# Patient Record
Sex: FEMALE | Race: WHITE | NOT HISPANIC OR LATINO | Employment: PART TIME | URBAN - METROPOLITAN AREA
[De-identification: names, ages, dates, MRNs, and addresses within clinical notes are randomized per-mention and may not be internally consistent; named-entity substitution may affect disease eponyms.]

---

## 2017-07-24 DIAGNOSIS — I63.9 CEREBROVASCULAR ACCIDENT (CVA), UNSPECIFIED MECHANISM (H): ICD-10-CM

## 2017-07-24 RX ORDER — ATORVASTATIN CALCIUM 20 MG/1
20 TABLET, FILM COATED ORAL DAILY
Qty: 90 TABLET | Refills: 0 | Status: SHIPPED | OUTPATIENT
Start: 2017-07-24 | End: 2017-10-21

## 2017-07-24 NOTE — TELEPHONE ENCOUNTER
atorvastatin (LIPITOR) 20 MG tablet    Last Written Prescription Date:  8/1/16  Last Fill Quantity: 90,   # refills: 3  Last Office Visit with [facility], [facility] or [facility] prescribing provider:  10/3/16  Future Office visit:    none

## 2017-08-31 DIAGNOSIS — I10 ESSENTIAL HYPERTENSION: ICD-10-CM

## 2017-09-05 RX ORDER — TRIAMTERENE AND HYDROCHLOROTHIAZIDE 37.5; 25 MG/1; MG/1
1 CAPSULE ORAL EVERY MORNING
Qty: 90 CAPSULE | Refills: 3 | Status: SHIPPED | OUTPATIENT
Start: 2017-09-05 | End: 2017-10-23

## 2017-09-05 NOTE — TELEPHONE ENCOUNTER
DYAZIDE  37.5-25 MG      Last Written Prescription Date:  10/7/16  Last Fill Quantity: 90, # refills: 3  Last Office Visit : 10/3/16  Next Clinic Visit:  NONE     Results for ROME RODRIGUEZ (MRN [id number]) as of 9/4/2017 19:33   Ref. Range 7/19/2016 16:09   Sodium Latest Ref Range: 133 - 144 mmol/L 140       Potassium   Date Value Ref Range Status   10/03/2016 3.0 (L) 3.4 - 5.3 mmol/L Final     Creatinine   Date Value Ref Range Status   07/19/2016 0.87 0.52 - 1.04 mg/dL Final     BP Readings from Last 3 Encounters:   10/03/16 135/83   08/01/16 (!) 162/94   07/20/16 (!) 160/94   Routing refill request to provider for review/approval because:  *labs are overdue, route to provider

## 2017-10-12 DIAGNOSIS — K21.9 GASTROESOPHAGEAL REFLUX DISEASE WITHOUT ESOPHAGITIS: Primary | ICD-10-CM

## 2017-10-12 RX ORDER — PANTOPRAZOLE SODIUM 40 MG/1
40 TABLET, DELAYED RELEASE ORAL DAILY
Qty: 30 TABLET | Refills: 0 | Status: SHIPPED | OUTPATIENT
Start: 2017-10-12 | End: 2017-10-23

## 2017-10-12 NOTE — LETTER
Natali Myers  [address] [address]        October 12, 2017    This letter is a reminder that you are overdue to see your Primary Care Provider for an Annual Visit and needed labs. You must be seen by your Primary Care Provider on a yearly basis and have appropriate labs drawn for continued care and prescription refills. Please call [phone] to schedule an appointment for an Annual Visit with Dr Moncho DEAL.       You have been given a 30 day supply/refill of your protonix 40 mg tabs  while you get your clinic visit/labs completed.      [facility],    Primary Care Center

## 2017-10-12 NOTE — TELEPHONE ENCOUNTER
pantoprazole (PROTONIX) 40 mg  Last Written Prescription Date:  9/23/16  Last Fill Quantity: 120,   # refills: 11  Last Office Visit with [facility], P or [facility] prescribing provider: 10/3/16  Future Office visit:   None    appt letter sent. needs annual appt.    Routing refill request to provider for review/approval because: pantoprazole (PROTONIX) 40 mg, per fax. Previous was 20 mg , 2 tabs.  40 mg is delayed release. Ok to switch?

## 2017-10-21 DIAGNOSIS — I63.9 CEREBROVASCULAR ACCIDENT (CVA), UNSPECIFIED MECHANISM (H): ICD-10-CM

## 2017-10-21 DIAGNOSIS — I10 ESSENTIAL HYPERTENSION: ICD-10-CM

## 2017-10-21 DIAGNOSIS — K21.9 GASTROESOPHAGEAL REFLUX DISEASE WITHOUT ESOPHAGITIS: ICD-10-CM

## 2017-10-23 RX ORDER — TRIAMTERENE AND HYDROCHLOROTHIAZIDE 37.5; 25 MG/1; MG/1
1 CAPSULE ORAL EVERY MORNING
Qty: 30 CAPSULE | Refills: 3 | Status: ON HOLD | OUTPATIENT
Start: 2017-10-23 | End: 2018-02-02

## 2017-10-23 RX ORDER — PANTOPRAZOLE SODIUM 40 MG/1
40 TABLET, DELAYED RELEASE ORAL DAILY
Qty: 30 TABLET | Refills: 0 | Status: SHIPPED | OUTPATIENT
Start: 2017-10-23 | End: 2017-12-07

## 2017-10-23 RX ORDER — ATORVASTATIN CALCIUM 20 MG/1
TABLET, FILM COATED ORAL
Qty: 30 TABLET | Refills: 0 | Status: SHIPPED | OUTPATIENT
Start: 2017-10-23 | End: 2017-11-22

## 2017-10-23 NOTE — TELEPHONE ENCOUNTER
lipitor  Last Written Prescription Date:  7/24/17  Last Fill Quantity: 90,   # refills: 0  Last Office Visit : 10/3/16  Future Office visit:  No future appt  Lab Results   Component Value Date    CHOL 122 08/01/2016     Lab Results   Component Value Date    HDL 51 08/01/2016     Lab Results   Component Value Date    LDL 32 08/01/2016     Lab Results   Component Value Date    TRIG 194 08/01/2016       Routing refill request to clinic nurse for review/approval because:  Nicole refill given 7/24/17, scheduling letter sent, no pending appt  Labs overdue    Pt states that she never received a letter about making an appointment or labs needed to be done.   Pt called and she is aware she needs appt within a month for refills.  Venus Crenshaw RN 3:12 PM on 10/23/2017.

## 2017-11-09 DIAGNOSIS — Z13.1 SCREENING FOR DIABETES MELLITUS: ICD-10-CM

## 2017-11-09 DIAGNOSIS — Z00.00 HEALTH CARE MAINTENANCE: Primary | ICD-10-CM

## 2017-11-22 DIAGNOSIS — I63.9 CEREBROVASCULAR ACCIDENT (CVA), UNSPECIFIED MECHANISM (H): ICD-10-CM

## 2017-11-27 RX ORDER — ATORVASTATIN CALCIUM 20 MG/1
20 TABLET, FILM COATED ORAL DAILY
Qty: 30 TABLET | Refills: 0 | Status: SHIPPED | OUTPATIENT
Start: 2017-11-27 | End: 2017-12-07

## 2017-11-27 NOTE — TELEPHONE ENCOUNTER
atorvastatin (LIPITOR) 20 MG tablet    Last Written Prescription Date:  10/23/17  Last Fill Quantity: 30,   # refills: 0  Last Office Visit : 10/3/16  Future Office visit:  12/5/17

## 2017-12-03 ASSESSMENT — ENCOUNTER SYMPTOMS
HOARSE VOICE: 1
TROUBLE SWALLOWING: 0
MYALGIAS: 1
POOR WOUND HEALING: 1
NAIL CHANGES: 0
ARTHRALGIAS: 1
SORE THROAT: 1
JOINT SWELLING: 0
BACK PAIN: 0
STIFFNESS: 1
TASTE DISTURBANCE: 0
NECK PAIN: 1
SMELL DISTURBANCE: 0
SINUS PAIN: 1
SKIN CHANGES: 0
SINUS CONGESTION: 0
NECK MASS: 0
MUSCLE WEAKNESS: 0
MUSCLE CRAMPS: 1

## 2017-12-04 ENCOUNTER — TELEPHONE (OUTPATIENT)
Dept: INTERNAL MEDICINE | Facility: CLINIC | Age: 57
End: 2017-12-04

## 2017-12-04 DIAGNOSIS — E87.6 HYPOKALEMIA: Primary | ICD-10-CM

## 2017-12-04 DIAGNOSIS — Z00.00 HEALTH CARE MAINTENANCE: ICD-10-CM

## 2017-12-04 DIAGNOSIS — Z13.1 SCREENING FOR DIABETES MELLITUS: ICD-10-CM

## 2017-12-04 LAB
ANION GAP SERPL CALCULATED.3IONS-SCNC: 8 MMOL/L (ref 3–14)
BUN SERPL-MCNC: 14 MG/DL (ref 7–30)
CALCIUM SERPL-MCNC: 8.9 MG/DL (ref 8.5–10.1)
CHLORIDE SERPL-SCNC: 99 MMOL/L (ref 94–109)
CHOLEST SERPL-MCNC: 107 MG/DL
CO2 SERPL-SCNC: 30 MMOL/L (ref 20–32)
CREAT SERPL-MCNC: 0.87 MG/DL (ref 0.52–1.04)
GFR SERPL CREATININE-BSD FRML MDRD: 67 ML/MIN/1.7M2
GLUCOSE SERPL-MCNC: 121 MG/DL (ref 70–99)
HBA1C MFR BLD: 6 % (ref 4.3–6)
HDLC SERPL-MCNC: 56 MG/DL
LDLC SERPL CALC-MCNC: 23 MG/DL
NONHDLC SERPL-MCNC: 52 MG/DL
POTASSIUM SERPL-SCNC: 2.9 MMOL/L (ref 3.4–5.3)
SODIUM SERPL-SCNC: 137 MMOL/L (ref 133–144)
TRIGL SERPL-MCNC: 142 MG/DL
TSH SERPL DL<=0.005 MIU/L-ACNC: 2.89 MU/L (ref 0.4–4)

## 2017-12-04 RX ORDER — POTASSIUM CHLORIDE 750 MG/1
20 TABLET, EXTENDED RELEASE ORAL DAILY
Qty: 60 TABLET | Refills: 1 | Status: SHIPPED | OUTPATIENT
Start: 2017-12-04 | End: 2017-12-06

## 2017-12-04 NOTE — TELEPHONE ENCOUNTER
Potassium is 2.9 today.  She is having muscle aches and dry mouth.  Recommended that she hold her Dyazide, start K Dur 20 mEq today and tomorrow  We'll recheck potassium tomorrow in clinic    Lilli Ivan MD

## 2017-12-05 ENCOUNTER — OFFICE VISIT (OUTPATIENT)
Dept: INTERNAL MEDICINE | Facility: CLINIC | Age: 57
End: 2017-12-05

## 2017-12-05 VITALS
OXYGEN SATURATION: 96 % | WEIGHT: 252.9 LBS | HEIGHT: 62 IN | RESPIRATION RATE: 22 BRPM | SYSTOLIC BLOOD PRESSURE: 143 MMHG | BODY MASS INDEX: 46.54 KG/M2 | TEMPERATURE: 97.9 F | DIASTOLIC BLOOD PRESSURE: 84 MMHG | HEART RATE: 91 BPM

## 2017-12-05 DIAGNOSIS — E87.6 HYPOKALEMIA: ICD-10-CM

## 2017-12-05 DIAGNOSIS — Z23 NEED FOR VACCINATION: ICD-10-CM

## 2017-12-05 DIAGNOSIS — J45.20 MILD INTERMITTENT ASTHMA WITHOUT COMPLICATION: Primary | ICD-10-CM

## 2017-12-05 DIAGNOSIS — E66.01 MORBID OBESITY (H): ICD-10-CM

## 2017-12-05 LAB — POTASSIUM SERPL-SCNC: 3.4 MMOL/L (ref 3.4–5.3)

## 2017-12-05 RX ORDER — COVID-19 ANTIGEN TEST
KIT MISCELLANEOUS
COMMUNITY
Start: 2016-08-02

## 2017-12-05 RX ORDER — ALBUTEROL SULFATE 90 UG/1
2 AEROSOL, METERED RESPIRATORY (INHALATION) EVERY 6 HOURS PRN
Qty: 1 INHALER | Refills: 1 | Status: SHIPPED | OUTPATIENT
Start: 2017-12-05 | End: 2020-01-30

## 2017-12-05 ASSESSMENT — ENCOUNTER SYMPTOMS
LOSS OF CONSCIOUSNESS: 0
TASTE DISTURBANCE: 0
HEMOPTYSIS: 0
WEAKNESS: 0
SWOLLEN GLANDS: 0
WHEEZING: 0
BLOATING: 0
DYSURIA: 0
RESPIRATORY PAIN: 0
DECREASED LIBIDO: 0
LEG PAIN: 0
HOT FLASHES: 0
DOUBLE VISION: 0
NECK PAIN: 1
SKIN CHANGES: 0
WEIGHT LOSS: 0
ALTERED TEMPERATURE REGULATION: 0
HEMATURIA: 0
NAUSEA: 0
POLYDIPSIA: 0
FATIGUE: 0
BRUISES/BLEEDS EASILY: 0
HYPOTENSION: 0
PARALYSIS: 0
HEARTBURN: 0
RECTAL PAIN: 0
HALLUCINATIONS: 0
ARTHRALGIAS: 1
SLEEP DISTURBANCES DUE TO BREATHING: 0
DIFFICULTY URINATING: 0
EYE IRRITATION: 0
DEPRESSION: 0
EYE PAIN: 0
JOINT SWELLING: 0
NAIL CHANGES: 0
TREMORS: 0
MUSCLE CRAMPS: 1
SNORES LOUDLY: 0
VOMITING: 0
NUMBNESS: 0
NERVOUS/ANXIOUS: 0
CLAUDICATION: 0
SINUS PAIN: 1
BOWEL INCONTINENCE: 0
HYPERTENSION: 0
BLOOD IN STOOL: 0
BACK PAIN: 0
FLANK PAIN: 0
JAUNDICE: 0
TROUBLE SWALLOWING: 0
NIGHT SWEATS: 0
COUGH DISTURBING SLEEP: 0
SEIZURES: 0
EYE REDNESS: 0
MUSCLE WEAKNESS: 0
RECTAL BLEEDING: 0
DECREASED CONCENTRATION: 0
POOR WOUND HEALING: 1
WEIGHT GAIN: 0
SINUS CONGESTION: 0
NECK MASS: 0
COUGH: 0
STIFFNESS: 1
SPEECH CHANGE: 0
TINGLING: 0
DECREASED APPETITE: 0
HOARSE VOICE: 1
EYE WATERING: 0
PANIC: 0
SORE THROAT: 1
TACHYCARDIA: 0
LEG SWELLING: 0
POLYPHAGIA: 0
MEMORY LOSS: 0
SYNCOPE: 0
DISTURBANCES IN COORDINATION: 0
MYALGIAS: 1
BREAST MASS: 0
INSOMNIA: 0
SHORTNESS OF BREATH: 0
ORTHOPNEA: 0
DYSPNEA ON EXERTION: 0
HEADACHES: 0
INCREASED ENERGY: 0
SMELL DISTURBANCE: 0
EXERCISE INTOLERANCE: 0
SPUTUM PRODUCTION: 0
POSTURAL DYSPNEA: 0
PALPITATIONS: 0
LIGHT-HEADEDNESS: 0
ABDOMINAL PAIN: 0
DIZZINESS: 0
EXTREMITY NUMBNESS: 0
BREAST PAIN: 0
DIARRHEA: 0
FEVER: 0
CONSTIPATION: 0
CHILLS: 0

## 2017-12-05 ASSESSMENT — PAIN SCALES - GENERAL: PAINLEVEL: NO PAIN (0)

## 2017-12-05 NOTE — MR AVS SNAPSHOT
After Visit Summary   12/5/2017    Natali Myers    MRN: [id number]           Patient Information     Date Of Birth          1960        Visit Information        Provider Department      12/5/2017 4:30 PM Lilli Ivan MD [facility]        Today's Diagnoses     Mild intermittent asthma without complication    -  1    Need for vaccination        Morbid obesity (H)        Hypokalemia          Care Instructions    Primary Care Center Medication Refill Request Information:  * Please contact your pharmacy regarding ANY request for medication refills.  ** [facility] Prescription Fax = [phone]  * Please allow 3 business days for routine medication refills.  * Please allow 5 business days for controlled substance medication refills.     Primary Care Center Test Result notification information:  *You will be notified with in 7-10 days of your appointment day regarding the results of your test.  If you are on MyChart you will be notified as soon as the provider has reviewed the results and signed off on them.    Primary Care Center [phone]     Weight Management [phone]             Follow-ups after your visit        Additional Services     Bariatric Adult Referral       Your provider has referred you to: [facility]: [facility] [phone]. [email or web address]    Please be aware that coverage of these services is subject to the terms and limitations of your health insurance plan.  Call member services at your health plan with any benefit or coverage questions.      Please bring the following with you to your appointment:      (1) List of current medications   (2) This referral request   (3) Any documents/labs given to you for this referral                  Your next 10 appointments already scheduled     Dec 05, 2017  6:00 PM CST   LAB with [facility] ([facility])    [address]  [address] [facility] [place] [address] [phone]           Please do not eat 10-12 hours before your appointment if you are coming in fasting for labs on lipids, cholesterol, or glucose (sugar). This does not apply to pregnant women. Water, hot tea and black coffee (with nothing added) are okay. Do not drink other fluids, diet soda or chew gum.              Future tests that were ordered for you today     Open Future Orders        Priority Expected Expires Ordered    Potassium Routine  12/6/2018 12/5/2017            Who to contact     Please call your clinic at [phone] to:    Ask questions about your health    Make or cancel appointments    Discuss your medicines    Learn about your test results    Speak to your doctor   If you have compliments or concerns about an experience at your clinic, or if you wish to file a complaint, please contact [facility] Patient Relations at [phone] or email us at [email or web address]         Additional Information About Your Visit        [employer] Information     [employer] gives you secure access to your electronic health record. If you see a primary care provider, you can also send messages to your care team and make appointments. If you have questions, please call your primary care clinic.  If you do not have a primary care provider, please call [phone] and they will assist you.      [employer] is an electronic gateway that provides easy, online access to your medical records. With [employer], you can request a clinic appointment, read your test results, renew a prescription or communicate with your care team.     To access your existing account, please contact your [facility] or call [phone] for assistance.        Care EveryWhere ID     This is your Care EveryWhere ID. This could be used by other organizations to access your [facility]  "records  [id number]        Your Vitals Were     Pulse Temperature Respirations Height Last Period Pulse Oximetry    91 97.9  F (36.6  C) (Oral) 22 1.562 m (5' 1.5\") (LMP Unknown) 96%    Breastfeeding? BMI (Body Mass Index)                No 47.01 kg/m2           Blood Pressure from Last 3 Encounters:   12/05/17 143/84   10/03/16 135/83   08/01/16 (!) 162/94    Weight from Last 3 Encounters:   12/05/17 114.7 kg (252 lb 14.4 oz)   10/03/16 108.4 kg (239 lb)   08/01/16 109.3 kg (241 lb)              We Performed the Following     ADMIN INFLUENZA VIRUS VAC     Bariatric Adult Referral     FLU VACCINE, 3 YRS +, IM (FLUZONE)          Today's Medication Changes          These changes are accurate as of: 12/5/17  5:19 PM.  If you have any questions, ask your nurse or doctor.               Start taking these medicines.        Dose/Directions    albuterol 108 (90 BASE) MCG/ACT Inhaler   Commonly known as:  PROAIR HFA/PROVENTIL HFA/VENTOLIN HFA   Used for:  Mild intermittent asthma without complication   Started by:  Lilli Ivan MD        Dose:  2 puff   Inhale 2 puffs into the lungs every 6 hours as needed for shortness of breath / dyspnea or wheezing   Quantity:  1 Inhaler   Refills:  1         Stop taking these medicines if you haven't already. Please contact your care team if you have questions.     TYLENOL 500 MG tablet   Generic drug:  acetaminophen   Stopped by:  Lilli Ivan MD                Where to get your medicines      These medications were sent to [facility]/pharmacy #[id number] - [place], [place] - [address]  [address], [place] [address]     Phone:  [phone]     albuterol 108 (90 BASE) MCG/ACT Inhaler                Primary Care Provider Office Phone # Fax #    Lilli Ivan [phone] [address] [address]        Equal Access to Services     KELLIE JONSE AH: Roberto Booth, violet osorio, qaybta kanima valentin " wilfredo [place] juan j blanton'aan ah. So [facility] [place] [phone].    ATENCIÓN: Si reela syed, tiene a ojeda disposición servicios gratuitos de asistencia lingüística. Bijan al [phone].    We comply with applicable federal civil rights laws and [place] laws. We do not discriminate on the basis of race, color, national origin, age, disability, sex, sexual orientation, or gender identity.            Thank you!     Thank you for choosing [facility]  for your care. Our goal is always to provide you with excellent care. Hearing back from our patients is one way we can continue to improve our services. Please take a few minutes to complete the written survey that you may receive in the mail after your visit with us. Thank you!             Your Updated Medication List - Protect others around you: Learn how to safely use, store and throw away your medicines at www.disposemymeds.org.          This list is accurate as of: 12/5/17  5:19 PM.  Always use your most recent med list.                   Brand Name Dispense Instructions for use Diagnosis    albuterol 108 (90 BASE) MCG/ACT Inhaler    PROAIR HFA/PROVENTIL HFA/VENTOLIN HFA    1 Inhaler    Inhale 2 puffs into the lungs every 6 hours as needed for shortness of breath / dyspnea or wheezing    Mild intermittent asthma without complication       ALEVE 220 MG capsule   Generic drug:  naproxen sodium           aspirin 325 MG tablet     180 tablet    Take 1 tablet (325 mg) by mouth daily    Cerebrovascular accident (CVA), unspecified mechanism (H)       atorvastatin 20 MG tablet    LIPITOR    30 tablet    Take 1 tablet (20 mg) by mouth daily    Cerebrovascular accident (CVA), unspecified mechanism (H)       Blood Pressure Monitoring Kit     1 kit    1 Device daily Pt to check BP readings daily at home    BMI 40.0-44.9, adult (H)       DIPHENHYDRAMINE HCL PO      Take 25 mg by mouth nightly as needed        FLONASE ALLERGY RELIEF 50 MCG/ACT spray   Generic drug:   fluticasone      Spray 2 sprays into both nostrils daily        pantoprazole 40 MG EC tablet    PROTONIX    30 tablet    Take 1 tablet (40 mg) by mouth daily    Gastroesophageal reflux disease without esophagitis       potassium chloride SA 10 MEQ CR tablet    K-DUR/KLOR-CON M    60 tablet    Take 2 tablets (20 mEq) by mouth daily    Hypokalemia       triamterene-hydrochlorothiazide 37.5-25 MG per capsule    DYAZIDE    30 capsule    Take 1 capsule by mouth every morning    Essential hypertension       ZYRTEC PO      Take 10 mg by mouth as needed

## 2017-12-05 NOTE — NURSING NOTE
Chief Complaint   Patient presents with     Physical     Patient is here for annual physical exam     Mirza Neal CMA (AAMA) at 4:34 PM on 12/5/2017

## 2017-12-05 NOTE — PROGRESS NOTES
Cc:  Annual exam  History of present illness: Natali is a 57-year-old woman with a past medical history of hyperlipidemia, hypertension, GERD, and stroke in the right frontal palatine junction noted on July 18, 2016 who presents for medical exam.    She has generally feeling well but has a few concerns:  1.  Hypokalemia. On Dyazide once daily, her serum potassium was 2.53 days ago. I called her, gave her prescription for K Dur and she has taken 40 mEq yesterday and today. She noticed an improvement in her muscle aches on the medication.  2.  Dry skin. She bathes every other day and uses lotion thoroughly. Nonetheless she has dry skin on her scalp and arms.  3.  History of stroke July 2016. She had no warning prior to the stroke and has had no TIAs since that time. She has been on ASA 3 and 25 mg daily.  The stroke was ischemic. There was an infarct in the right frontoparietal junction. MRA of the neck showed mild to severe stenoses in the proximal vertebral arteries right greater than left and tortuous internal carotid arteries.  4.  Osteoarthritis of the knees. She has had bilateral knee surgery at age 23 and since then has had continued knee pain. She is able to walk up and down the stairs of her condominium and is able to walk good distances at a reasonable pace. She cannot use bicycle because it hurts her knees.  5.  Essential hypertension. She has a blood pressure cuff at home but has not been using it. She would like to monitor her blood pressure at home. We discussed the importance of achieving a target of less than 130/80.  6.  Mild intermittent asthma.  Last spring she opened movile home, alllergic reaciton, cough , wheeze.  She had been using Zyrtec and that was insufficient prophylaxis. She would like an albuterol inhaler   7.  Healthcare maintenance: Up-to-date on colonoscopy, mammography. She is status post hysterectomy.   8.  Bilateral plantar fasciitis.   9.  Laboratory results reviewed in detail. Of  note her LDL cholesterol is 23. Also she is prediabetic with an A1c of 6.0. We discussed that in some detail.     Past Medical History:   Diagnosis Date     Anemia     due to bleeding from uterus/fibroid     Arthritis     OA left hip, knees, wrists     Asthma with bronchitis      Chronic headaches      Fibroids      GERD (gastroesophageal reflux disease)     omprazole     Neck pain     trapezius tightness     Seasonal allergies     mold and grass     Stroke (H) July 18, 2016.    MRI scan that showed recent infarct in the central white matter of the right frontoparietal junction;       Past Surgical History:   Procedure Laterality Date     DILATION AND CURETTAGE  5/24/2014    Procedure: DILATION AND CURETTAGE;  Surgeon: Elyse Mcconnell MD;  Location: [facility] OR     HYSTERECTOMY TOTAL ABDOMINAL  6/19/2014    Procedure: HYSTERECTOMY TOTAL ABDOMINAL;  Surgeon: Elyse Mcconnell MD;  Location: [facility] OR     KNEE SURGERY  age 23     cartilage/ incision both knees     RELEASE CARPAL TUNNEL BILATERAL  1996    [place]     SALPINGO-OOPHORECTOMY BILATERAL  6/19/2014    Procedure: SALPINGO-OOPHORECTOMY BILATERAL;  Surgeon: Elyse Mcconnell MD;  Location: [facility] OR     WRIST SURGERY     Review of Systems     Constitutional:  Negative for fever, chills, weight loss, weight gain, fatigue, decreased appetite, night sweats, recent stressors, height gain, height loss, post-operative complications, incisional pain, hallucinations, increased energy, hyperactivity and confused.   HENT:  Positive for tinnitus, hoarse voice, sore throat, dry mouth and sinus pain. Negative for ear pain, hearing loss, nosebleeds, trouble swallowing, mouth sores, ear discharge, tooth pain, gum tenderness, taste disturbance, smell disturbance, hearing aid, bleeding gums, sinus congestion and neck mass.    Eyes:  Negative for double vision, pain, redness, eye pain, decreased vision, eye watering, eye bulging, eye dryness, flashing lights, spots, floaters, strabismus, tunnel  vision, jaundice and eye irritation.   Respiratory:   Negative for cough, hemoptysis, sputum production, shortness of breath, wheezing, sleep disturbances due to breathing, snores loudly, respiratory pain, dyspnea on exertion, cough disturbing sleep and postural dyspnea.    Cardiovascular:  Negative for chest pain, dyspnea on exertion, palpitations, orthopnea, claudication, leg swelling, fingers/toes turn blue, hypertension, hypotension, syncope, history of heart murmur, chest pain on exertion, chest pain at rest, pacemaker, few scattered varicosities, leg pain, sleep disturbances due to breathing, tachycardia, light-headedness, exercise intolerance and edema.   Gastrointestinal:  Negative for heartburn, nausea, vomiting, abdominal pain, diarrhea, constipation, blood in stool, melena, rectal pain, bloating, hemorrhoids, bowel incontinence, jaundice, rectal bleeding, coffee ground emesis and change in stool.   Genitourinary:  Negative for bladder incontinence, dysuria, urgency, hematuria, flank pain, vaginal discharge, difficulty urinating, genital sores, dyspareunia, decreased libido, nocturia, voiding less frequently, arousal difficulty, abnormal vaginal bleeding, excessive menstruation, menstrual changes, hot flashes, vaginal dryness and postmenopausal bleeding.   Musculoskeletal:  Positive for myalgias, arthralgias, stiffness, muscle cramps and neck pain. Negative for back pain, joint swelling, bone pain, muscle weakness and fracture.   Skin:  Positive for itching, poor wound healing, acne, poor wound healing and flaky skin. Negative for nail changes, rash, hair changes, skin changes, warts, scarring, Raynaud's phenomenon, sensitivity to sunlight and skin thickening.   Neurological:  Negative for dizziness, tingling, tremors, speech change, seizures, loss of consciousness, weakness, light-headedness, numbness, headaches, disturbances in coordination, extremity numbness, memory loss, difficulty walking and  "paralysis.   Endo/Heme:  Negative for anemia, swollen glands and bruises/bleeds easily.   Psychiatric/Behavioral:  Negative for depression, hallucinations, memory loss, decreased concentration, mood swings and panic attacks.    Breast:  Negative for breast discharge, breast mass, breast pain and nipple retraction.   Endocrine:  Negative for altered temperature regulation, polyphagia, polydipsia, unwanted hair growth and change in facial hair.    Family History   Problem Relation Age of Onset     DIABETES Mother      Blood Disease Mother      KIDNEY DISEASE Mother      Cardiovascular Father      Alcohol/Drug Father      DIABETES Maternal Grandmother      Thyroid Disease Maternal Grandmother      CANCER Paternal Grandmother      eye      Asthma Brother      CEREBROVASCULAR DISEASE Father      great uncles and aunts have had CVAs     Dementia Father      Dementia Paternal Grandmother      Social History     Social History     Marital status:      Spouse name: N/A     Number of children: N/A     Years of education: N/A     Occupational History     Not on file.     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    , mother of 4 children; 4 grandchildren     /84 (BP Location: Right arm, Patient Position: Chair, Cuff Size: Adult Large)  Pulse 91  Temp 97.9  F (36.6  C) (Oral)  Resp 22  Ht 1.562 m (5' 1.5\")  Wt 114.7 kg (252 lb 14.4 oz)  LMP  (LMP Unknown)  SpO2 96%  Breastfeeding? No  BMI 47.01 kg/m2  Exam:  Constitutional: Pleasant, obese 57-year-old woman   Head: Normocephalic. No masses, lesions, tenderness or abnormalities  Neck: Neck supple. No adenopathy. Thyroid symmetric, normal size,, Carotids without " bruits.  ENT: ENT exam normal, no neck nodes or sinus tenderness  Cardiovascular: negative, PMI normal. No lifts, heaves, or thrills. RRR. No murmurs, clicks gallops or rub  Respiratory: negative, Percussion normal. Good diaphragmatic excursion. Lungs clear  Gastrointestinal: Abdomen soft, non-tender. BS normal. No masses, organomegaly  : Deferred  Musculoskeletal: No pretibial edema. I did not examine her knees.   Skin: There is a circular maculopapular purplish lesion on the anterior shin on the left with a central nodule. The borders are regular and well demarcated. There is no fluctuance.   Neurologic: Gait normal. Reflexes normal and symmetric. Sensation grossly WNL.  Psychiatric: mentation appears normal and affect normal/bright  Hematologic/Lymphatic/Immunologic: Normal cervical lymph nodes      Last Basic Metabolic Panel:  Lab Results   Component Value Date     12/04/2017      Lab Results   Component Value Date    POTASSIUM 2.9 12/04/2017     Lab Results   Component Value Date    CHLORIDE 99 12/04/2017     Lab Results   Component Value Date    ADEN 8.9 12/04/2017     Lab Results   Component Value Date    CO2 30 12/04/2017     Lab Results   Component Value Date    BUN 14 12/04/2017     Lab Results   Component Value Date    CR 0.87 12/04/2017     Lab Results   Component Value Date     12/04/2017       ASSESSMENT  1.  Essential hypertension. I've asked her to monitor her blood pressure at home with a target of less than 130/80. She will notify me if the blood pressure begins to rise above that at home. We will resume the Dyazide along with some supplemental potassium after we check the serum potassium level today.  2.  Dry skin. No evidence of seborrhea keratoses. We talked about reducing the number of showers she takes.  3.  Prediabetes: A1c is 6.0. We talked about how to get more exercise, and to lose some weight. I will refer her to the bariatric weight loss clinic for information. She would  like to think about it based on information from the clinic before she attends. In the meantime she will look into possibilities for getting exercise near her home, perhaps at a local club. She is limited by plantar fasciitis and knee osteoarthritis. Plan monitor in one year.  4.  History of stroke. Ischemic stroke. The most important thing to do now other than continuing ASA is 2 reduce the risk factors and that is what we talked about with the blood pressure, prediabetes and hyperlipidemia.  5.  Morbid obesity.  She has struggled with her weight for years. She has tried many programs and none have been successful. She is not interested in bariatric surgery but may be interested in exploring options for medical bariatric treatment. She may not qualify for bariatric surgery based on criteria but her risk of stroke, her prediabetes, the severe OA of the knees bilaterally, reflux esophagitis, and hypertension are all linked to this obesity.  6.  Skin lesion left anterior leg. This is not a dysplastic nevus. It looks vascular. The central thickening suggested lipogranuloma. Plan to observe it.  7.  Healthcare maintenance: I'll give flu shot today, see below    Natali was seen today for physical.    Diagnoses and all orders for this visit:    Mild intermittent asthma without complication  -     albuterol (PROAIR HFA/PROVENTIL HFA/VENTOLIN HFA) 108 (90 BASE) MCG/ACT Inhaler; Inhale 2 puffs into the lungs every 6 hours as needed for shortness of breath / dyspnea or wheezing    Need for vaccination  -     FLU VACCINE, 3 YRS +, IM (FLUZONE)  -     ADMIN INFLUENZA VIRUS VAC    Morbid obesity (H)  -     Bariatric Adult Referral    Hypokalemia  -     Potassium; Future    Other orders  -     Cancel: WEIGHT MANAGEMENT/ [facility] LIFESTYLE PROGRAM REFERRAL

## 2017-12-05 NOTE — NURSING NOTE
"Injectable Influenza Immunization Documentation    1.  Has the patient received the information for the injectable influenza vaccine? YES     2. Is the patient 6 months of age or older? YES     3. Does the patient have any of the following contraindications?         Severe allergy to eggs? No     Severe allergic reaction to previous influenza vaccines? No   Severe allergy to latex? No       History of Guillain-Shishmaref syndrome? No     Currently have a temperature greater than 100.4F? No        4.  Severely egg allergic patients should have flu vaccine eligibility assessed by an MD, RN, or pharmacist, and those who received flu vaccine should be observed for 15 min by an MD, RN, Pharmacist, Medical Technician, or member of clinic staff.\": YES    5. Latex-allergic patients should be given latex-free influenza vaccine Yes. Please reference the Vaccine latex table to determine if your clinic s product is latex-containing.       Administered Influenza Fluzone Quadrivalent vaccine (see Immunizations in Chart Review). Patient tolerated well.        Mirza Neal CMA at 5:37 PM on 12/5/2017         "

## 2017-12-05 NOTE — PATIENT INSTRUCTIONS
[facility] Medication Refill Request Information:  * Please contact your pharmacy regarding ANY request for medication refills.  ** [facility] Prescription Fax = [phone]  * Please allow 3 business days for routine medication refills.  * Please allow 5 business days for controlled substance medication refills.     [facility] Test Result notification information:  *You will be notified with in 7-10 days of your appointment day regarding the results of your test.  If you are on MyChart you will be notified as soon as the provider has reviewed the results and signed off on them.    [facility] [phone]     Weight Management [phone]

## 2017-12-06 ENCOUNTER — TELEPHONE (OUTPATIENT)
Dept: INTERNAL MEDICINE | Facility: CLINIC | Age: 57
End: 2017-12-06

## 2017-12-06 DIAGNOSIS — E87.6 HYPOKALEMIA: ICD-10-CM

## 2017-12-06 DIAGNOSIS — E87.6 HYPOKALEMIA: Primary | ICD-10-CM

## 2017-12-06 DIAGNOSIS — I10 ESSENTIAL HYPERTENSION: ICD-10-CM

## 2017-12-06 RX ORDER — POTASSIUM CHLORIDE 750 MG/1
10 TABLET, EXTENDED RELEASE ORAL DAILY
Qty: 60 TABLET | Refills: 1 | Status: ON HOLD | COMMUNITY
Start: 2017-12-06 | End: 2018-02-02

## 2017-12-06 NOTE — TELEPHONE ENCOUNTER
I called pt with K results:  3.4  Plan:  Resume dyazide once daily, take kdur 10 meq daily, recheck K in one month, monitor bp at home with goal of <130/80.    Lilli Ivan

## 2018-01-09 ENCOUNTER — MYC REFILL (OUTPATIENT)
Dept: INTERNAL MEDICINE | Facility: CLINIC | Age: 58
End: 2018-01-09

## 2018-01-09 DIAGNOSIS — K21.9 GASTROESOPHAGEAL REFLUX DISEASE WITHOUT ESOPHAGITIS: ICD-10-CM

## 2018-01-09 NOTE — TELEPHONE ENCOUNTER
Message from [facility]:  Serenity Olvera, RN Tue Jan 9, 2018 7:09 AM        ----- Message -----   From: Natali Myers   Sent: 1/9/2018 6:46 AM   To: [facility] Nursing Staff-  Subject: Medication Renewal Request     Original authorizing provider: MD Natali Maloney would like a refill of the following medications:  pantoprazole (PROTONIX) 40 MG EC tablet [Lilli Ivan MD]    Preferred pharmacy: [facility] [id number] [place] [address] [address]    Comment:

## 2018-01-10 RX ORDER — PANTOPRAZOLE SODIUM 40 MG/1
40 TABLET, DELAYED RELEASE ORAL DAILY
Qty: 90 TABLET | Refills: 3 | Status: SHIPPED | OUTPATIENT
Start: 2018-01-10 | End: 2018-12-23

## 2018-01-31 ENCOUNTER — HOSPITAL ENCOUNTER (INPATIENT)
Facility: CLINIC | Age: 58
LOS: 2 days | Discharge: HOME | End: 2018-02-02
Attending: EMERGENCY MEDICINE | Admitting: PSYCHIATRY & NEUROLOGY
Payer: COMMERCIAL

## 2018-01-31 ENCOUNTER — APPOINTMENT (OUTPATIENT)
Dept: CT IMAGING | Facility: CLINIC | Age: 58
End: 2018-01-31
Attending: EMERGENCY MEDICINE
Payer: COMMERCIAL

## 2018-01-31 ENCOUNTER — APPOINTMENT (OUTPATIENT)
Dept: INTERVENTIONAL RADIOLOGY/VASCULAR | Facility: CLINIC | Age: 58
End: 2018-01-31
Payer: COMMERCIAL

## 2018-01-31 DIAGNOSIS — E87.6 HYPOKALEMIA: ICD-10-CM

## 2018-01-31 DIAGNOSIS — I63.9 ACUTE CVA (CEREBROVASCULAR ACCIDENT) (H): ICD-10-CM

## 2018-01-31 LAB
ABO + RH BLD: NORMAL
ABO + RH BLD: NORMAL
ALBUMIN SERPL-MCNC: 3.9 G/DL (ref 3.4–5)
ALBUMIN UR-MCNC: 10 MG/DL
ALP SERPL-CCNC: 118 U/L (ref 40–150)
ALT SERPL W P-5'-P-CCNC: 26 U/L (ref 0–50)
AMORPH CRY #/AREA URNS HPF: ABNORMAL /HPF
ANION GAP SERPL CALCULATED.3IONS-SCNC: 11 MMOL/L (ref 3–14)
ANION GAP SERPL CALCULATED.3IONS-SCNC: 12 MMOL/L (ref 3–14)
APPEARANCE UR: CLEAR
APTT PPP: 27 SEC (ref 22–37)
AST SERPL W P-5'-P-CCNC: 21 U/L (ref 0–45)
BASOPHILS # BLD AUTO: 0.1 10E9/L (ref 0–0.2)
BASOPHILS NFR BLD AUTO: 0.5 %
BILIRUB SERPL-MCNC: 0.6 MG/DL (ref 0.2–1.3)
BILIRUB UR QL STRIP: NEGATIVE
BLD GP AB SCN SERPL QL: NORMAL
BLOOD BANK CMNT PATIENT-IMP: NORMAL
BUN SERPL-MCNC: 12 MG/DL (ref 7–30)
BUN SERPL-MCNC: 12 MG/DL (ref 7–30)
CA-I BLD-SCNC: 4.7 MG/DL (ref 4.4–5.2)
CALCIUM SERPL-MCNC: 8.3 MG/DL (ref 8.5–10.1)
CALCIUM SERPL-MCNC: 9 MG/DL (ref 8.5–10.1)
CHLORIDE SERPL-SCNC: 100 MMOL/L (ref 94–109)
CHLORIDE SERPL-SCNC: 103 MMOL/L (ref 94–109)
CO2 BLDCOV-SCNC: 31 MMOL/L (ref 21–28)
CO2 SERPL-SCNC: 24 MMOL/L (ref 20–32)
CO2 SERPL-SCNC: 26 MMOL/L (ref 20–32)
COLOR UR AUTO: ABNORMAL
CREAT BLD-MCNC: 0.9 MG/DL (ref 0.52–1.04)
CREAT SERPL-MCNC: 0.7 MG/DL (ref 0.52–1.04)
CREAT SERPL-MCNC: 0.76 MG/DL (ref 0.52–1.04)
DIFFERENTIAL METHOD BLD: NORMAL
EOSINOPHIL # BLD AUTO: 0.2 10E9/L (ref 0–0.7)
EOSINOPHIL NFR BLD AUTO: 1.9 %
ERYTHROCYTE [DISTWIDTH] IN BLOOD BY AUTOMATED COUNT: 12.9 % (ref 10–15)
ERYTHROCYTE [DISTWIDTH] IN BLOOD BY AUTOMATED COUNT: 13.1 % (ref 10–15)
GFR SERPL CREATININE-BSD FRML MDRD: 64 ML/MIN/1.7M2
GFR SERPL CREATININE-BSD FRML MDRD: 78 ML/MIN/1.7M2
GFR SERPL CREATININE-BSD FRML MDRD: 87 ML/MIN/1.7M2
GLUCOSE BLD-MCNC: 139 MG/DL (ref 70–99)
GLUCOSE BLDC GLUCOMTR-MCNC: 145 MG/DL (ref 70–99)
GLUCOSE SERPL-MCNC: 131 MG/DL (ref 70–99)
GLUCOSE SERPL-MCNC: 141 MG/DL (ref 70–99)
GLUCOSE UR STRIP-MCNC: NEGATIVE MG/DL
HBA1C MFR BLD: 5.6 % (ref 4.3–6)
HCT VFR BLD AUTO: 40.5 % (ref 35–47)
HCT VFR BLD AUTO: 43.2 % (ref 35–47)
HCT VFR BLD CALC: 43 %PCV (ref 35–47)
HGB BLD CALC-MCNC: 14.6 G/DL (ref 11.7–15.7)
HGB BLD-MCNC: 14.1 G/DL (ref 11.7–15.7)
HGB BLD-MCNC: 15.2 G/DL (ref 11.7–15.7)
HGB UR QL STRIP: ABNORMAL
IMM GRANULOCYTES # BLD: 0 10E9/L (ref 0–0.4)
IMM GRANULOCYTES NFR BLD: 0.2 %
INR BLD: 1.1 (ref 0.86–1.14)
INR PPP: 0.93 (ref 0.86–1.14)
INR PPP: 1.13 (ref 0.86–1.14)
KETONES UR STRIP-MCNC: NEGATIVE MG/DL
LEUKOCYTE ESTERASE UR QL STRIP: NEGATIVE
LYMPHOCYTES # BLD AUTO: 4 10E9/L (ref 0.8–5.3)
LYMPHOCYTES NFR BLD AUTO: 38.5 %
MCH RBC QN AUTO: 32.6 PG (ref 26.5–33)
MCH RBC QN AUTO: 32.8 PG (ref 26.5–33)
MCHC RBC AUTO-ENTMCNC: 34.8 G/DL (ref 31.5–36.5)
MCHC RBC AUTO-ENTMCNC: 35.2 G/DL (ref 31.5–36.5)
MCV RBC AUTO: 93 FL (ref 78–100)
MCV RBC AUTO: 94 FL (ref 78–100)
MONOCYTES # BLD AUTO: 0.8 10E9/L (ref 0–1.3)
MONOCYTES NFR BLD AUTO: 8.1 %
MRSA DNA SPEC QL NAA+PROBE: NEGATIVE
MUCOUS THREADS #/AREA URNS LPF: PRESENT /LPF
NEUTROPHILS # BLD AUTO: 5.2 10E9/L (ref 1.6–8.3)
NEUTROPHILS NFR BLD AUTO: 50.8 %
NITRATE UR QL: NEGATIVE
NRBC # BLD AUTO: 0 10*3/UL
NRBC BLD AUTO-RTO: 0 /100
PCO2 BLDV: 41 MM HG (ref 40–50)
PH BLDV: 7.49 PH (ref 7.32–7.43)
PH UR STRIP: 6 PH (ref 5–7)
PLATELET # BLD AUTO: 191 10E9/L (ref 150–450)
PLATELET # BLD AUTO: 232 10E9/L (ref 150–450)
PO2 BLDV: 66 MM HG (ref 25–47)
POTASSIUM BLD-SCNC: 3 MMOL/L (ref 3.4–5.3)
POTASSIUM SERPL-SCNC: 2.9 MMOL/L (ref 3.4–5.3)
POTASSIUM SERPL-SCNC: 3 MMOL/L (ref 3.4–5.3)
PROT SERPL-MCNC: 8.2 G/DL (ref 6.8–8.8)
RBC # BLD AUTO: 4.32 10E12/L (ref 3.8–5.2)
RBC # BLD AUTO: 4.63 10E12/L (ref 3.8–5.2)
RBC #/AREA URNS AUTO: 2 /HPF (ref 0–2)
SAO2 % BLDV FROM PO2: 94 %
SODIUM BLD-SCNC: 138 MMOL/L (ref 133–144)
SODIUM SERPL-SCNC: 138 MMOL/L (ref 133–144)
SODIUM SERPL-SCNC: 139 MMOL/L (ref 133–144)
SOURCE: ABNORMAL
SP GR UR STRIP: 1.04 (ref 1–1.03)
SPECIMEN EXP DATE BLD: NORMAL
SPECIMEN SOURCE: NORMAL
SQUAMOUS #/AREA URNS AUTO: 1 /HPF (ref 0–1)
TROPONIN I SERPL-MCNC: <0.015 UG/L (ref 0–0.04)
TROPONIN I SERPL-MCNC: <0.015 UG/L (ref 0–0.04)
UROBILINOGEN UR STRIP-MCNC: NORMAL MG/DL (ref 0–2)
WBC # BLD AUTO: 10.3 10E9/L (ref 4–11)
WBC # BLD AUTO: 10.8 10E9/L (ref 4–11)
WBC #/AREA URNS AUTO: <1 /HPF (ref 0–2)

## 2018-01-31 PROCEDURE — C1769 GUIDE WIRE: HCPCS

## 2018-01-31 PROCEDURE — 40000497 ZZHCL STATISTIC SODIUM ED POCT

## 2018-01-31 PROCEDURE — 85027 COMPLETE CBC AUTOMATED: CPT | Performed by: PSYCHIATRY & NEUROLOGY

## 2018-01-31 PROCEDURE — 99285 EMERGENCY DEPT VISIT HI MDM: CPT | Mod: 25 | Performed by: EMERGENCY MEDICINE

## 2018-01-31 PROCEDURE — 82803 BLOOD GASES ANY COMBINATION: CPT

## 2018-01-31 PROCEDURE — 27210732 ZZH ACCESSORY CR1

## 2018-01-31 PROCEDURE — 40000498 ZZHCL STATISTIC POTASSIUM ED POCT

## 2018-01-31 PROCEDURE — 36224 PLACE CATH CAROTD ART: CPT

## 2018-01-31 PROCEDURE — 27210908 ZZH NEEDLE CR4

## 2018-01-31 PROCEDURE — 85610 PROTHROMBIN TIME: CPT | Performed by: EMERGENCY MEDICINE

## 2018-01-31 PROCEDURE — C1887 CATHETER, GUIDING: HCPCS

## 2018-01-31 PROCEDURE — C2628 CATHETER, OCCLUSION: HCPCS

## 2018-01-31 PROCEDURE — 83036 HEMOGLOBIN GLYCOSYLATED A1C: CPT | Performed by: STUDENT IN AN ORGANIZED HEALTH CARE EDUCATION/TRAINING PROGRAM

## 2018-01-31 PROCEDURE — 99153 MOD SED SAME PHYS/QHP EA: CPT

## 2018-01-31 PROCEDURE — 80048 BASIC METABOLIC PNL TOTAL CA: CPT | Performed by: PSYCHIATRY & NEUROLOGY

## 2018-01-31 PROCEDURE — 00000146 ZZHCL STATISTIC GLUCOSE BY METER IP

## 2018-01-31 PROCEDURE — 25000128 H RX IP 250 OP 636: Performed by: EMERGENCY MEDICINE

## 2018-01-31 PROCEDURE — 70496 CT ANGIOGRAPHY HEAD: CPT

## 2018-01-31 PROCEDURE — 27210738 ZZH ACCESSORY CR2

## 2018-01-31 PROCEDURE — 93005 ELECTROCARDIOGRAM TRACING: CPT

## 2018-01-31 PROCEDURE — 86900 BLOOD TYPING SEROLOGIC ABO: CPT | Performed by: STUDENT IN AN ORGANIZED HEALTH CARE EDUCATION/TRAINING PROGRAM

## 2018-01-31 PROCEDURE — 27210907 ZZH KIT CR9

## 2018-01-31 PROCEDURE — 25000128 H RX IP 250 OP 636: Performed by: RADIOLOGY

## 2018-01-31 PROCEDURE — 82565 ASSAY OF CREATININE: CPT

## 2018-01-31 PROCEDURE — 96365 THER/PROPH/DIAG IV INF INIT: CPT | Performed by: EMERGENCY MEDICINE

## 2018-01-31 PROCEDURE — 85610 PROTHROMBIN TIME: CPT | Mod: QW

## 2018-01-31 PROCEDURE — 81001 URINALYSIS AUTO W/SCOPE: CPT | Performed by: STUDENT IN AN ORGANIZED HEALTH CARE EDUCATION/TRAINING PROGRAM

## 2018-01-31 PROCEDURE — C1760 CLOSURE DEV, VASC: HCPCS

## 2018-01-31 PROCEDURE — 61645 PERQ ART M-THROMBECT &/NFS: CPT

## 2018-01-31 PROCEDURE — 87641 MR-STAPH DNA AMP PROBE: CPT

## 2018-01-31 PROCEDURE — 84484 ASSAY OF TROPONIN QUANT: CPT | Performed by: EMERGENCY MEDICINE

## 2018-01-31 PROCEDURE — 93005 ELECTROCARDIOGRAM TRACING: CPT | Performed by: EMERGENCY MEDICINE

## 2018-01-31 PROCEDURE — 25000128 H RX IP 250 OP 636: Performed by: STUDENT IN AN ORGANIZED HEALTH CARE EDUCATION/TRAINING PROGRAM

## 2018-01-31 PROCEDURE — C1757 CATH, THROMBECTOMY/EMBOLECT: HCPCS

## 2018-01-31 PROCEDURE — 3E03317 INTRODUCTION OF OTHER THROMBOLYTIC INTO PERIPHERAL VEIN, PERCUTANEOUS APPROACH: ICD-10-PCS | Performed by: PSYCHIATRY & NEUROLOGY

## 2018-01-31 PROCEDURE — 40000501 ZZHCL STATISTIC HEMATOCRIT ED POCT

## 2018-01-31 PROCEDURE — 93010 ELECTROCARDIOGRAM REPORT: CPT | Mod: Z6 | Performed by: EMERGENCY MEDICINE

## 2018-01-31 PROCEDURE — 85610 PROTHROMBIN TIME: CPT | Performed by: PSYCHIATRY & NEUROLOGY

## 2018-01-31 PROCEDURE — 86901 BLOOD TYPING SEROLOGIC RH(D): CPT | Performed by: STUDENT IN AN ORGANIZED HEALTH CARE EDUCATION/TRAINING PROGRAM

## 2018-01-31 PROCEDURE — 40000502 ZZHCL STATISTIC GLUCOSE ED POCT

## 2018-01-31 PROCEDURE — 25000125 ZZHC RX 250: Performed by: PSYCHIATRY & NEUROLOGY

## 2018-01-31 PROCEDURE — 27210889 ZZH ACCESSORY CR8

## 2018-01-31 PROCEDURE — 99291 CRITICAL CARE FIRST HOUR: CPT | Mod: 25 | Performed by: EMERGENCY MEDICINE

## 2018-01-31 PROCEDURE — 84484 ASSAY OF TROPONIN QUANT: CPT | Performed by: PSYCHIATRY & NEUROLOGY

## 2018-01-31 PROCEDURE — 03CG3ZZ EXTIRPATION OF MATTER FROM INTRACRANIAL ARTERY, PERCUTANEOUS APPROACH: ICD-10-PCS | Performed by: RADIOLOGY

## 2018-01-31 PROCEDURE — 93010 ELECTROCARDIOGRAM REPORT: CPT | Performed by: INTERNAL MEDICINE

## 2018-01-31 PROCEDURE — 25000128 H RX IP 250 OP 636: Performed by: PSYCHIATRY & NEUROLOGY

## 2018-01-31 PROCEDURE — 85025 COMPLETE CBC W/AUTO DIFF WBC: CPT | Performed by: EMERGENCY MEDICINE

## 2018-01-31 PROCEDURE — 36415 COLL VENOUS BLD VENIPUNCTURE: CPT | Performed by: STUDENT IN AN ORGANIZED HEALTH CARE EDUCATION/TRAINING PROGRAM

## 2018-01-31 PROCEDURE — 86850 RBC ANTIBODY SCREEN: CPT | Performed by: STUDENT IN AN ORGANIZED HEALTH CARE EDUCATION/TRAINING PROGRAM

## 2018-01-31 PROCEDURE — 87640 STAPH A DNA AMP PROBE: CPT

## 2018-01-31 PROCEDURE — 82330 ASSAY OF CALCIUM: CPT

## 2018-01-31 PROCEDURE — 99152 MOD SED SAME PHYS/QHP 5/>YRS: CPT

## 2018-01-31 PROCEDURE — 96375 TX/PRO/DX INJ NEW DRUG ADDON: CPT | Performed by: EMERGENCY MEDICINE

## 2018-01-31 PROCEDURE — 85730 THROMBOPLASTIN TIME PARTIAL: CPT | Performed by: PSYCHIATRY & NEUROLOGY

## 2018-01-31 PROCEDURE — 20000004 ZZH R&B ICU UMMC

## 2018-01-31 PROCEDURE — 80053 COMPREHEN METABOLIC PANEL: CPT | Performed by: EMERGENCY MEDICINE

## 2018-01-31 RX ORDER — SODIUM CHLORIDE 9 MG/ML
INJECTION, SOLUTION INTRAVENOUS CONTINUOUS
Status: DISCONTINUED | OUTPATIENT
Start: 2018-01-31 | End: 2018-01-31

## 2018-01-31 RX ORDER — FLUMAZENIL 0.1 MG/ML
0.2 INJECTION, SOLUTION INTRAVENOUS
Status: DISCONTINUED | OUTPATIENT
Start: 2018-01-31 | End: 2018-01-31 | Stop reason: HOSPADM

## 2018-01-31 RX ORDER — NALOXONE HYDROCHLORIDE 0.4 MG/ML
.1-.4 INJECTION, SOLUTION INTRAMUSCULAR; INTRAVENOUS; SUBCUTANEOUS
Status: DISCONTINUED | OUTPATIENT
Start: 2018-01-31 | End: 2018-02-02 | Stop reason: HOSPADM

## 2018-01-31 RX ORDER — IODIXANOL 320 MG/ML
150 INJECTION, SOLUTION INTRAVASCULAR ONCE
Status: COMPLETED | OUTPATIENT
Start: 2018-01-31 | End: 2018-01-31

## 2018-01-31 RX ORDER — SODIUM CHLORIDE 9 MG/ML
INJECTION, SOLUTION INTRAVENOUS CONTINUOUS
Status: DISCONTINUED | OUTPATIENT
Start: 2018-01-31 | End: 2018-02-01

## 2018-01-31 RX ORDER — ONDANSETRON 2 MG/ML
4 INJECTION INTRAMUSCULAR; INTRAVENOUS ONCE
Status: COMPLETED | OUTPATIENT
Start: 2018-01-31 | End: 2018-01-31

## 2018-01-31 RX ORDER — VERAPAMIL HYDROCHLORIDE 2.5 MG/ML
2.5-1 INJECTION, SOLUTION INTRAVENOUS
Status: COMPLETED | OUTPATIENT
Start: 2018-01-31 | End: 2018-01-31

## 2018-01-31 RX ORDER — PROCHLORPERAZINE MALEATE 5 MG
10 TABLET ORAL EVERY 6 HOURS PRN
Status: DISCONTINUED | OUTPATIENT
Start: 2018-01-31 | End: 2018-02-02 | Stop reason: HOSPADM

## 2018-01-31 RX ORDER — METOCLOPRAMIDE HYDROCHLORIDE 5 MG/ML
10 INJECTION INTRAMUSCULAR; INTRAVENOUS EVERY 6 HOURS PRN
Status: DISCONTINUED | OUTPATIENT
Start: 2018-01-31 | End: 2018-02-02 | Stop reason: HOSPADM

## 2018-01-31 RX ORDER — ONDANSETRON 2 MG/ML
4 INJECTION INTRAMUSCULAR; INTRAVENOUS EVERY 6 HOURS PRN
Status: DISCONTINUED | OUTPATIENT
Start: 2018-01-31 | End: 2018-02-02 | Stop reason: HOSPADM

## 2018-01-31 RX ORDER — FENTANYL CITRATE 50 UG/ML
25-50 INJECTION, SOLUTION INTRAMUSCULAR; INTRAVENOUS EVERY 5 MIN PRN
Status: DISCONTINUED | OUTPATIENT
Start: 2018-01-31 | End: 2018-01-31 | Stop reason: HOSPADM

## 2018-01-31 RX ORDER — POTASSIUM CHLORIDE 7.45 MG/ML
10 INJECTION INTRAVENOUS
Status: DISCONTINUED | OUTPATIENT
Start: 2018-01-31 | End: 2018-02-02

## 2018-01-31 RX ORDER — NALOXONE HYDROCHLORIDE 0.4 MG/ML
.1-.4 INJECTION, SOLUTION INTRAMUSCULAR; INTRAVENOUS; SUBCUTANEOUS
Status: DISCONTINUED | OUTPATIENT
Start: 2018-01-31 | End: 2018-01-31 | Stop reason: HOSPADM

## 2018-01-31 RX ORDER — METOCLOPRAMIDE 5 MG/1
10 TABLET ORAL EVERY 6 HOURS PRN
Status: DISCONTINUED | OUTPATIENT
Start: 2018-01-31 | End: 2018-02-02 | Stop reason: HOSPADM

## 2018-01-31 RX ORDER — POTASSIUM CHLORIDE 750 MG/1
20-40 TABLET, EXTENDED RELEASE ORAL
Status: DISCONTINUED | OUTPATIENT
Start: 2018-01-31 | End: 2018-02-02

## 2018-01-31 RX ORDER — MAGNESIUM SULFATE HEPTAHYDRATE 40 MG/ML
4 INJECTION, SOLUTION INTRAVENOUS EVERY 4 HOURS PRN
Status: DISCONTINUED | OUTPATIENT
Start: 2018-01-31 | End: 2018-02-02 | Stop reason: HOSPADM

## 2018-01-31 RX ORDER — IOPAMIDOL 755 MG/ML
75 INJECTION, SOLUTION INTRAVASCULAR ONCE
Status: COMPLETED | OUTPATIENT
Start: 2018-01-31 | End: 2018-01-31

## 2018-01-31 RX ORDER — PROCHLORPERAZINE 25 MG
25 SUPPOSITORY, RECTAL RECTAL EVERY 12 HOURS PRN
Status: DISCONTINUED | OUTPATIENT
Start: 2018-01-31 | End: 2018-02-02 | Stop reason: HOSPADM

## 2018-01-31 RX ORDER — ONDANSETRON 4 MG/1
4 TABLET, ORALLY DISINTEGRATING ORAL EVERY 6 HOURS PRN
Status: DISCONTINUED | OUTPATIENT
Start: 2018-01-31 | End: 2018-02-02 | Stop reason: HOSPADM

## 2018-01-31 RX ORDER — POTASSIUM CHLORIDE 1.5 G/1.58G
20-40 POWDER, FOR SOLUTION ORAL
Status: DISCONTINUED | OUTPATIENT
Start: 2018-01-31 | End: 2018-02-02

## 2018-01-31 RX ORDER — LABETALOL HYDROCHLORIDE 5 MG/ML
10 INJECTION, SOLUTION INTRAVENOUS EVERY 10 MIN PRN
Status: DISCONTINUED | OUTPATIENT
Start: 2018-01-31 | End: 2018-02-02 | Stop reason: HOSPADM

## 2018-01-31 RX ORDER — ALBUTEROL SULFATE 90 UG/1
2 AEROSOL, METERED RESPIRATORY (INHALATION) EVERY 6 HOURS PRN
Status: DISCONTINUED | OUTPATIENT
Start: 2018-01-31 | End: 2018-02-02 | Stop reason: HOSPADM

## 2018-01-31 RX ORDER — LIDOCAINE 40 MG/G
CREAM TOPICAL
Status: DISCONTINUED | OUTPATIENT
Start: 2018-01-31 | End: 2018-02-02 | Stop reason: HOSPADM

## 2018-01-31 RX ORDER — SODIUM CHLORIDE 9 MG/ML
INJECTION, SOLUTION INTRAVENOUS CONTINUOUS
Status: DISCONTINUED | OUTPATIENT
Start: 2018-01-31 | End: 2018-01-31 | Stop reason: HOSPADM

## 2018-01-31 RX ORDER — POTASSIUM CHLORIDE 29.8 MG/ML
20 INJECTION INTRAVENOUS
Status: DISCONTINUED | OUTPATIENT
Start: 2018-01-31 | End: 2018-02-02

## 2018-01-31 RX ORDER — POTASSIUM CL/LIDO/0.9 % NACL 10MEQ/0.1L
10 INTRAVENOUS SOLUTION, PIGGYBACK (ML) INTRAVENOUS
Status: DISCONTINUED | OUTPATIENT
Start: 2018-01-31 | End: 2018-02-02

## 2018-01-31 RX ORDER — LIDOCAINE 40 MG/G
CREAM TOPICAL
Status: DISCONTINUED | OUTPATIENT
Start: 2018-01-31 | End: 2018-01-31 | Stop reason: HOSPADM

## 2018-01-31 RX ADMIN — SODIUM CHLORIDE: 9 INJECTION, SOLUTION INTRAVENOUS at 21:14

## 2018-01-31 RX ADMIN — MIDAZOLAM 2 MG: 1 INJECTION INTRAMUSCULAR; INTRAVENOUS at 18:41

## 2018-01-31 RX ADMIN — IOPAMIDOL 75 ML: 755 INJECTION, SOLUTION INTRAVENOUS at 16:48

## 2018-01-31 RX ADMIN — VERAPAMIL HYDROCHLORIDE 5 MG: 2.5 INJECTION INTRAVENOUS at 18:38

## 2018-01-31 RX ADMIN — FENTANYL CITRATE 100 MCG: 50 INJECTION, SOLUTION INTRAMUSCULAR; INTRAVENOUS at 18:41

## 2018-01-31 RX ADMIN — ONDANSETRON 4 MG: 2 INJECTION INTRAMUSCULAR; INTRAVENOUS at 16:48

## 2018-01-31 RX ADMIN — IODIXANOL 100 ML: 320 INJECTION, SOLUTION INTRAVASCULAR at 18:50

## 2018-01-31 RX ADMIN — LIDOCAINE HYDROCHLORIDE 30 ML: 10 INJECTION, SOLUTION EPIDURAL; INFILTRATION; INTRACAUDAL; PERINEURAL at 18:42

## 2018-01-31 RX ADMIN — HEPARIN SODIUM 5000 UNITS: 1000 INJECTION, SOLUTION INTRAVENOUS; SUBCUTANEOUS at 18:44

## 2018-01-31 RX ADMIN — ALTEPLASE 9 MG: KIT at 17:05

## 2018-01-31 RX ADMIN — ALTEPLASE 81 MG: KIT at 17:08

## 2018-01-31 ASSESSMENT — VISUAL ACUITY
OU: GLASSES

## 2018-01-31 ASSESSMENT — ACTIVITIES OF DAILY LIVING (ADL)
TRANSFERRING: 0-->INDEPENDENT
BATHING: 0-->INDEPENDENT
RETIRED_COMMUNICATION: 0-->UNDERSTANDS/COMMUNICATES WITHOUT DIFFICULTY
SWALLOWING: 0-->SWALLOWS FOODS/LIQUIDS WITHOUT DIFFICULTY
RETIRED_EATING: 0-->INDEPENDENT
COGNITION: 0 - NO COGNITION ISSUES REPORTED
FALL_HISTORY_WITHIN_LAST_SIX_MONTHS: NO
WHICH_OF_THE_ABOVE_FUNCTIONAL_RISKS_HAD_A_RECENT_ONSET_OR_CHANGE?: SWALLOWING;COMMUNICATION/SPEECH
DRESS: 0-->INDEPENDENT
AMBULATION: 0-->INDEPENDENT
TOILETING: 0-->INDEPENDENT

## 2018-01-31 ASSESSMENT — ENCOUNTER SYMPTOMS
SPEECH DIFFICULTY: 1
WEAKNESS: 1

## 2018-01-31 NOTE — ED PROVIDER NOTES
History     Chief Complaint   Patient presents with     Cerebrovascular Accident     HPI  Natali Myers is a 58 year old female with a history of stroke (2016), asthma, and GERD who presents to the Emergency Department for evaluation of CVA.Today, patient was in a meeting at 3:00 PM when her colleges noticed that she started having slurred speech around 3:20 PM and was given 2 regular strength aspirin at 3:30 PM. She was able to walk to the car after work, but was unable walk upon arrival to the ED; nurses assisted the patient out of the car and also noted left sided weakness. Patient is now having left sided weakness and left sided facial droop and continued slurred speech. Patient denies any falls. Prior to the meeting today, she was walking and talking normally. Pt takes aspirin daily.       Past Medical History:   Diagnosis Date     Arthritis     OA left hip, knees, wrists     Asthma with bronchitis      Chronic headaches      GERD (gastroesophageal reflux disease)     omprazole     Neck pain     trapezius tightness     Seasonal allergies     mold and grass     Stroke (H) July 18, 2016.    MRI scan that showed recent infarct in the central white matter of the right frontoparietal junction;         Past Surgical History:   Procedure Laterality Date     DILATION AND CURETTAGE  5/24/2014    Procedure: DILATION AND CURETTAGE;  Surgeon: Elyse Mcconnell MD;  Location: [facility] OR     HYSTERECTOMY TOTAL ABDOMINAL  6/19/2014    Procedure: HYSTERECTOMY TOTAL ABDOMINAL;  Surgeon: Elyse Mcconnell MD;  Location: [facility] OR     KNEE SURGERY  age 23     cartilage/ incision both knees     RELEASE CARPAL TUNNEL BILATERAL  1996    [place]     SALPINGO-OOPHORECTOMY BILATERAL  6/19/2014    Procedure: SALPINGO-OOPHORECTOMY BILATERAL;  Surgeon: Elyse Mcconnell MD;  Location: [facility] OR     WRIST SURGERY         Family History   Problem Relation Age of Onset     DIABETES Mother      Blood Disease Mother      KIDNEY DISEASE Mother       Cardiovascular Father      Alcohol/Drug Father      DIABETES Maternal Grandmother      Thyroid Disease Maternal Grandmother      CANCER Paternal Grandmother      eye      Asthma Brother      CEREBROVASCULAR DISEASE Father      great uncles and aunts have had CVAs     Dementia Father      Dementia Paternal Grandmother        Social History   Substance Use Topics     Smoking status: Never Smoker     Smokeless tobacco: Never Used     Alcohol use Yes      Comment: One a month        Current Facility-Administered Medications   Medication     Medication Instruction - NO anti-coagulation or anti-platelet meds for 24 hours after end of IV alteplase (ACTIVASE) INFUSION [Including: heparin, enoxaparin (LOVENOX), warfarin (COUMADIN), aspirin, NSAIDs, clopidogrel (PLAVIX), dipyridamole (PERSANTINE)].     Medication Instruction: For Pharmacy Assistance Regarding ateplase (ACTIVASE) Administration: [facility]  Page: [phone] - 24 hours per day OR [facility] Page: [phone]     0.9% sodium chloride BOLUS     lidocaine 1 % 1 mL     lidocaine (LMX4) kit     sodium chloride (PF) 0.9% PF flush 3 mL     sodium chloride (PF) 0.9% PF flush 3 mL     0.9% sodium chloride infusion     medication instruction     fentaNYL (PF) (SUBLIMAZE) injection 25-50 mcg     naloxone (NARCAN) injection 0.1-0.4 mg     midazolam (VERSED) injection 0.5-1 mg     flumazenil (ROMAZICON) injection 0.2 mg     lidocaine 1 % 1-30 mL     iodixanol (VISIPAQUE 320) injection 150 mL     [START ON 2/1/2018] pantoprazole (PROTONIX) 40 mg IV push injection        Allergies   Allergen Reactions     Mold      Seasonal Allergies      Hay fever     Codeine Rash     Latex Rash     Pt states this is NOT a latex allergy,allergy is neoprene rubber/in knee brace     No Clinical Screening - See Comments Rash     Neoprene rubber       Penicillins Rash         I have reviewed the Medications, Allergies, Past Medical and Surgical History, and Social History in the Epic  system.    Review of Systems   Neurological: Positive for speech difficulty and weakness (left sided).   All other systems reviewed and are negative.      Physical Exam   BP: (!) 147/103  Pulse: 101  Heart Rate: 93  Temp: 97.2  F (36.2  C)  Resp: 25  Weight: 114.7 kg (252 lb 13.9 oz)  SpO2: 95 %      Physical Exam   Constitutional: She is oriented to person, place, and time. She appears well-developed and well-nourished.   Slurred speech; unable to stand up form wheelchair   HENT:   Head: Normocephalic and atraumatic.   Eyes: Pupils are equal, round, and reactive to light.   Neck: Normal range of motion. Neck supple.   Cardiovascular: Normal rate, regular rhythm and normal heart sounds.    Pulmonary/Chest: Effort normal and breath sounds normal. No respiratory distress.   Abdominal: Soft. She exhibits no distension. There is no tenderness. There is no rebound.   Musculoskeletal: She exhibits no tenderness.   Neurological: She is alert and oriented to person, place, and time. A cranial nerve deficit is present.   Left sided upper and lower extremity drift; mild left sided neglect; facial droop on left side;    Skin: Skin is warm and dry.   Psychiatric: She has a normal mood and affect. Her behavior is normal. Thought content normal.       ED Course   4:30 PM  The patient was seen and examined by Ivy Canales MD in Room 2.     ED Course     Procedures             EKG Interpretation:      Interpreted by Ivy Canales  Time reviewed: 1703  Symptoms at time of EKG: none   Rhythm: normal sinus   Rate: normal  Axis: normal  Ectopy: none  Conduction: normal  ST Segments/ T Waves: No ST-T wave changes  Q Waves: none  Comparison to prior: Unchanged    Clinical Impression: normal EKG          Critical Care time:  45 minutes     The patient has stroke symptoms:           ED Stroke specific documentation           NIHSS PDF          Protocol PDF     Patient last known well time: 1515pm  ED Provider first to bedside at:  1615  CT Results received at:   Patient was treated with TPA.  The risks and benefits of TPA were reviewed using the risk information document.  These risks included bleeding complications such as intracranial bleeding and death. The patient or patient s surrogate s decisional capacity was assessed to be intact. TPA decision was made after this informed consent.    National Institutes of Health Stroke Scale (Baseline)  Time Performed: 1515      Score    Level of consciousness: (0)   Alert, keenly responsive    LOC questions: (0)   Answers both questions correctly    LOC commands: (0)   Performs both tasks correctly    Best gaze: (1)   Partial gaze palsy    Visual: (0)   No visual loss    Facial palsy: (2)   Partial paralysis (total/near total of lower face)    Motor arm (left): (1)   Drift    Motor arm (right): (0)   No drift    Motor leg (left): (1)   Drift    Motor leg (right): (0)   No drift    Limb ataxia: (0)   Absent    Sensory: (1)   Mild to moderate sensory loss    Best language: (1)   Mild to moderate aphasia    Dysarthria: (0)   Normal    Extinction and inattention: (1)   Visual, tacile, auditory, spatial, person inattention        Total Score:  8        Stroke Mimics were considered (including migraine headache, seizure disorder, hypoglycemia (or hyperglycemia), head or spinal trauma, CNS infection, Toxin ingestion and shock state (e.g. sepsis) .                       Labs Ordered and Resulted from Time of ED Arrival Up to the Time of Departure from the ED   COMPREHENSIVE METABOLIC PANEL - Abnormal; Notable for the following:        Result Value    Potassium 2.9 (*)     Glucose 131 (*)     All other components within normal limits   GLUCOSE BY METER - Abnormal; Notable for the following:     Glucose 145 (*)     All other components within normal limits   ISTAT GASES ELEC ICA GLUC KAIA POCT - Abnormal; Notable for the following:     Ph Venous 7.49 (*)     PO2 Venous 66 (*)     Bicarbonate Venous 31 (*)      Potassium 3.0 (*)     Glucose 139 (*)     All other components within normal limits   GLUCOSE MONITOR NURSING POCT   CBC WITH PLATELETS DIFFERENTIAL   INR   TROPONIN I   INR POINT OF CARE   CREATININE POCT   ISTAT CG4 GASES LACTATE KAIA NURSING POCT   ISTAT CREATININE NURSING POCT   ISTAT CG8 GAS ELEC ICA GLUC KAIA NURSE POCT   ISTAT INR NURSING POCT   VITAL SIGNS   NEURO CHECKS   VITAL SIGNS AND NEURO CHECKS   ACTIVITY   PULSE OXIMETRY NURSING   ASSESSMENT           Results for orders placed or performed during the hospital encounter of 01/31/18   CT Head Neck Angio w/o & w Contrast    Narrative    Head CT without contrast, CTA of the head and neck 1/31/2018 5:05 PM    Head CT without contrast  CT angiogram of the neck   CT angiogram of the base of the brain with contrast  Reconstruction by the Radiologist on the 3D workstation    Provided History:  Acute stroke symptoms    Comparison:  7/19/2016.      Technique:  HEAD CT:  Using multidetector thin collimation helical acquisition  technique, axial, coronal and sagittal CT images from the skull base  to the vertex were obtained without intravenous contrast.   HEAD and NECK CTA: During rapid bolus intravenous injection of  nonionic contrast material, axial images were obtained using thin  collimation multidetector helical technique from the base of the skull  through the [place] of [place]. This CT angiogram data was reconstructed  at thin intervals with mild overlap. Images were sent to the [employer]  workstation, and 3D reconstructions were obtained. The axial source  images, multiplanar reformations, 3D reconstructions in both maximum  intensity projection display and volume rendered models were reviewed,  with reconstructions performed by the technologist and the  radiologist.    Contrast: 75    Findings:  Head CT: There is no evidence of intracranial hemorrhage, mass effect,  or midline shift. Gray/white matter differentiation in both cerebral  hemispheres is  preserved. There is mild patchy low attenuation within  the periventricular and supraventricular white matter of both cerebral  hemispheres, nonspecific but most likely representing chronic small  vessel ischemic disease given the patient's age. Ventricles are  proportionate to the cerebral sulci.     Head CTA demonstrates no definite aneurysm or stenosis of the major  intracranial arteries. The anterior communicating artery is patent.  The posterior communicating artery is patent and the left and absent  on the right.     Neck CTA demonstrates no evident stenosis of the major cervical  arteries. The origins of the great vessels from the aortic arch are  patent. The normal distal right internal carotid artery measures 5 mm.  The normal distal left internal carotid artery measures 5 mm.     No mass is noted within the visualized portions of the cervical soft  tissues or lung apices.       Impression    Impression:    1. Head CTA demonstrates no aneurysm or stenosis of the major  intracranial arteries.   2. Neck CTA demonstrates no stenosis of the major cervical arteries.   3. No evidence of intracranial hemorrhage on the noncontrast head CT.       I have personally reviewed the examination and initial interpretation  and I agree with the findings.    IVELISSE OTERO MD   CBC with platelets differential   Result Value Ref Range    WBC 10.3 4.0 - 11.0 10e9/L    RBC Count 4.63 3.8 - 5.2 10e12/L    Hemoglobin 15.2 11.7 - 15.7 g/dL    Hematocrit 43.2 35.0 - 47.0 %    MCV 93 78 - 100 fl    MCH 32.8 26.5 - 33.0 pg    MCHC 35.2 31.5 - 36.5 g/dL    RDW 13.1 10.0 - 15.0 %    Platelet Count 232 150 - 450 10e9/L    Diff Method Automated Method     % Neutrophils 50.8 %    % Lymphocytes 38.5 %    % Monocytes 8.1 %    % Eosinophils 1.9 %    % Basophils 0.5 %    % Immature Granulocytes 0.2 %    Nucleated RBCs 0 0 /100    Absolute Neutrophil 5.2 1.6 - 8.3 10e9/L    Absolute Lymphocytes 4.0 0.8 - 5.3 10e9/L    Absolute Monocytes 0.8  0.0 - 1.3 10e9/L    Absolute Eosinophils 0.2 0.0 - 0.7 10e9/L    Absolute Basophils 0.1 0.0 - 0.2 10e9/L    Abs Immature Granulocytes 0.0 0 - 0.4 10e9/L    Absolute Nucleated RBC 0.0    INR   Result Value Ref Range    INR 0.93 0.86 - 1.14   Comprehensive metabolic panel   Result Value Ref Range    Sodium 138 133 - 144 mmol/L    Potassium 2.9 (L) 3.4 - 5.3 mmol/L    Chloride 100 94 - 109 mmol/L    Carbon Dioxide 26 20 - 32 mmol/L    Anion Gap 12 3 - 14 mmol/L    Glucose 131 (H) 70 - 99 mg/dL    Urea Nitrogen 12 7 - 30 mg/dL    Creatinine 0.76 0.52 - 1.04 mg/dL    GFR Estimate 78 >60 mL/min/1.7m2    GFR Estimate If Black >90 >60 mL/min/1.7m2    Calcium 9.0 8.5 - 10.1 mg/dL    Bilirubin Total 0.6 0.2 - 1.3 mg/dL    Albumin 3.9 3.4 - 5.0 g/dL    Protein Total 8.2 6.8 - 8.8 g/dL    Alkaline Phosphatase 118 40 - 150 U/L    ALT 26 0 - 50 U/L    AST 21 0 - 45 U/L   Troponin I   Result Value Ref Range    Troponin I ES <0.015 0.000 - 0.045 ug/L   Glucose by meter   Result Value Ref Range    Glucose 145 (H) 70 - 99 mg/dL   INR point of care   Result Value Ref Range    INR Point of Care 1.1 0.86 - 1.14   Creatinine POCT   Result Value Ref Range    Creatinine 0.9 0.52 - 1.04 mg/dL    GFR Estimate 64 >60 mL/min/1.7m2    GFR Estimate If Black 78 >60 mL/min/1.7m2   Hemoglobin A1c   Result Value Ref Range    Hemoglobin A1C 5.6 4.3 - 6.0 %   UA with Microscopic reflex to Culture   Result Value Ref Range    Color Urine Light Yellow     Appearance Urine Clear     Glucose Urine Negative NEG^Negative mg/dL    Bilirubin Urine Negative NEG^Negative    Ketones Urine Negative NEG^Negative mg/dL    Specific Gravity Urine 1.044 (H) 1.003 - 1.035    Blood Urine Moderate (A) NEG^Negative    pH Urine 6.0 5.0 - 7.0 pH    Protein Albumin Urine 10 (A) NEG^Negative mg/dL    Urobilinogen mg/dL Normal 0.0 - 2.0 mg/dL    Nitrite Urine Negative NEG^Negative    Leukocyte Esterase Urine Negative NEG^Negative    Source Unspecified Urine     WBC Urine <1 0 -  2 /HPF    RBC Urine 2 0 - 2 /HPF    Squamous Epithelial /HPF Urine 1 0 - 1 /HPF    Mucous Urine Present (A) NEG^Negative /LPF    Amorphous Crystals Few (A) NEG^Negative /HPF   Basic metabolic panel   Result Value Ref Range    Sodium 139 133 - 144 mmol/L    Potassium 3.0 (L) 3.4 - 5.3 mmol/L    Chloride 103 94 - 109 mmol/L    Carbon Dioxide 24 20 - 32 mmol/L    Anion Gap 11 3 - 14 mmol/L    Glucose 141 (H) 70 - 99 mg/dL    Urea Nitrogen 12 7 - 30 mg/dL    Creatinine 0.70 0.52 - 1.04 mg/dL    GFR Estimate 87 >60 mL/min/1.7m2    GFR Estimate If Black >90 >60 mL/min/1.7m2    Calcium 8.3 (L) 8.5 - 10.1 mg/dL   CBC with platelets   Result Value Ref Range    WBC 10.8 4.0 - 11.0 10e9/L    RBC Count 4.32 3.8 - 5.2 10e12/L    Hemoglobin 14.1 11.7 - 15.7 g/dL    Hematocrit 40.5 35.0 - 47.0 %    MCV 94 78 - 100 fl    MCH 32.6 26.5 - 33.0 pg    MCHC 34.8 31.5 - 36.5 g/dL    RDW 12.9 10.0 - 15.0 %    Platelet Count 191 150 - 450 10e9/L   INR   Result Value Ref Range    INR 1.13 0.86 - 1.14   Partial thromboplastin time   Result Value Ref Range    PTT 27 22 - 37 sec   Troponin I   Result Value Ref Range    Troponin I ES <0.015 0.000 - 0.045 ug/L   EKG 12-lead, tracing only   Result Value Ref Range    Interpretation ECG Click View Image link to view waveform and result    EKG 12-lead, tracing only   Result Value Ref Range    Interpretation ECG Click View Image link to view waveform and result    ISTAT gases elec ica gluc courtney POCT   Result Value Ref Range    Ph Venous 7.49 (H) 7.32 - 7.43 pH    PCO2 Venous 41 40 - 50 mm Hg    PO2 Venous 66 (H) 25 - 47 mm Hg    Bicarbonate Venous 31 (H) 21 - 28 mmol/L    O2 Sat Venous 94 %    Sodium 138 133 - 144 mmol/L    Potassium 3.0 (L) 3.4 - 5.3 mmol/L    Glucose 139 (H) 70 - 99 mg/dL    Calcium Ionized 4.7 4.4 - 5.2 mg/dL    Hemoglobin 14.6 11.7 - 15.7 g/dL    Hematocrit - POCT 43 35.0 - 47.0 %PCV   ABO/Rh type and screen   Result Value Ref Range    ABO A     RH(D) Neg     Antibody Screen Neg      Test Valid Only At          [facility]    Specimen Expires 02/03/2018    Methicillin Resist/Sens S. aureus PCR   Result Value Ref Range    Specimen Description Nares     Methicillin Resist/Sens S. aureus PCR Negative NEG^Negative     Medications   Medication Instruction - NO anti-coagulation or anti-platelet meds for 24 hours after end of IV alteplase (ACTIVASE) INFUSION [Including: heparin, enoxaparin (LOVENOX), warfarin (COUMADIN), aspirin, NSAIDs, clopidogrel (PLAVIX), dipyridamole (PERSANTINE)]. (not administered)   Medication Instruction: For Pharmacy Assistance Regarding ateplase (ACTIVASE) Administration: [facility]  Page: [phone] - 24 hours per day OR [facility] Page: [phone] (not administered)   naloxone (NARCAN) injection 0.1-0.4 mg (not administered)   lidocaine 1 % 0.5-5 mL (not administered)   lidocaine (LMX4) kit (not administered)   labetalol (NORMODYNE/TRANDATE) injection 10 mg (not administered)   potassium chloride SA (K-DUR/KLOR-CON M) CR tablet 20-40 mEq (not administered)   potassium chloride (KLOR-CON) Packet 20-40 mEq (not administered)   potassium chloride 10 mEq in 100 mL sterile water intermittent infusion (premix) (not administered)   potassium chloride 10 mEq in 100 mL intermittent infusion with 10 mg lidocaine (10 mEq Intravenous New Bag 2/1/18 0120)   potassium chloride 20 mEq in 50 mL intermittent infusion (not administered)   magnesium sulfate 2 g in NS intermittent infusion (PharMEDium or FV Cmpd) (not administered)   magnesium sulfate 4 g in 100 mL sterile water (premade) (not administered)   Medication Instruction - NO anti-coagulation or anti-platelet meds for 24 hours after end of IV alteplase (ACTIVASE) INFUSION [Including: heparin, enoxaparin (LOVENOX), warfarin (COUMADIN), aspirin, NSAIDs, clopidogrel (PLAVIX), dipyridamole (PERSANTINE)]. (not administered)   Medication Instruction: For Pharmacy Assistance  Regarding ateplase (ACTIVASE) Administration: [facility]  Page: [phone] - 24 hours per day OR [facility] Page: [phone] (not administered)   alteplase (ACTIVASE) bolus dose 9 mg (9 mg Intravenous Given 1/31/18 1705)     Followed by   alteplase (ACTIVASE) infusion 81 mg (81 mg Intravenous New Bag 1/31/18 1708)     Followed by   0.9% sodium chloride BOLUS (100 mLs Intravenous Not Given 1/31/18 2005)   albuterol (PROAIR HFA/PROVENTIL HFA/VENTOLIN HFA) Inhaler 2 puff (not administered)   pantoprazole (PROTONIX) 40 mg IV push injection (not administered)   0.9% sodium chloride infusion ( Intravenous New Bag 1/31/18 2114)   Hold: Metformin and metformin containing medications on day of the procedure and for 48 hours after IV contrast given- Patients with acute kidney injury or severe chronic kidney disease (stage IV or stage V; i.e., eGFR less than 30) (not administered)   ondansetron (ZOFRAN-ODT) ODT tab 4 mg (not administered)     Or   ondansetron (ZOFRAN) injection 4 mg (not administered)   prochlorperazine (COMPAZINE) injection 10 mg (not administered)     Or   prochlorperazine (COMPAZINE) tablet 10 mg (not administered)     Or   prochlorperazine (COMPAZINE) Suppository 25 mg (not administered)   metoclopramide (REGLAN) tablet 10 mg (not administered)     Or   metoclopramide (REGLAN) injection 10 mg (not administered)   potassium phosphate 10 mmol in D5W 250 mL intermittent infusion (not administered)   potassium phosphate 15 mmol in D5W 250 mL intermittent infusion (not administered)   potassium phosphate 20 mmol in D5W 500 mL intermittent infusion (not administered)   potassium phosphate 20 mmol in D5W 250 mL intermittent infusion (not administered)   potassium phosphate 25 mmol in D5W 500 mL intermittent infusion (not administered)   ondansetron (ZOFRAN) injection 4 mg (4 mg Intravenous Given 1/31/18 1648)   iopamidol (ISOVUE-370) solution 75 mL (75 mLs Intravenous Given 1/31/18 1648)   sodium  chloride (PF) 0.9% PF flush 90 mL (90 mLs Intravenous Given 1/31/18 1648)   lidocaine 1 % 1-30 mL (30 mLs Intradermal Given by Other 1/31/18 1842)   iodixanol (VISIPAQUE 320) injection 150 mL (100 mLs INTRA-ARTERIAL Given by Other Clinician 1/31/18 1850)   verapamil (ISOPTIN) injection 2.5-10 mg (5 mg INTRA-ARTERIAL Given 1/31/18 1838)         Assessments & Plan (with Medical Decision Making): Patient is a 58-year-old female with a history of prior stroke who presents to the ER due to acute onset of slurred speech.  Per colleague patient walked into a meeting at 3 PM and was feeling normal.  Around 3:15-3:20 patient had acute onset of slurred speech.  Patient took some aspirin in the office and then walked to the car with a colleague who drove her to the ER.  By the time she got to the ER she was unable to walk.  Patient here in the ER had substantial facial droop, slurred speech, and left-sided drift with some neglect.  Stroke team was called by nurse in triage.  Patient was taken to the CT scanner by the stroke team.  Patient received TPA in CT by the stroke team.  Patient clearly has symptoms of acute CVA.  Patient will be admitted to the Neuro Stroke team for acute treatment.  Critical care for this patient is 45 minutes.  Did discuss the plan of care with her colleague drove her to the ER.  Patient will be admitted to the ICU for further treatment.   This part of the document was transcribed by Bria So, Medical Scribe.     I have reviewed the nursing notes.    I have reviewed the findings, diagnosis, plan and need for follow up with the patient.    Current Discharge Medication List          Final diagnoses:   Acute CVA (cerebrovascular accident) (H)   IAmelia, am serving as a trained medical scribe to document services personally performed by Ivy Canales MD, based on the provider's statements to me.      Ivy MALDONADO MD, was physically present and have reviewed and verified the accuracy of  this note documented by Amelia Grigsby.       1/31/2018   [facility], [place], EMERGENCY DEPARTMENT     Ivy Canales MD  02/01/18 0142

## 2018-01-31 NOTE — H&P
[facility], [place]      Neurology Stroke Admission    Patient Name: Natali Myers  : 1960 MRN#: [id number]    STROKE DATA    Stroke Code:  Time called:  2018 1631  Time patient seen:  2018 1632  Onset of symptoms:  2018 1530  Last known normal (pt's baseline):  2018 1530  Head CT read by Dr. Carrera, Dr. Blackwell and Dr. Yang at:  2018 1652    TPA treatment:  Administered at 1708.  The treatment plan was discussed with and approved by Attending MD prior to administration.  Risks and benefits of tPA were discussed with Patient prior to administration.      Stroke Scales      National Institutes of Health Stroke Scale (at presentation)   NIHSS done at:  time patient seen      Score    Level of consciousness:  (0)   Alert, keenly responsive    LOC questions:  (0)   Answers both questions correctly    LOC commands:  (0)   Performs both tasks correctly    Best gaze:  (1)   Partial gaze palsy    Visual:  (1)   Partial hemianopia    Facial palsy:  (2)   Partial paralysis (total/near total of lower face)    Motor arm (left):  (2)   Some effort against gravity    Motor arm (right):  (0)   No drift    Motor leg (left):  (1)   Drift    Motor leg (right):  (0)   No drift    Limb ataxia:  (0)   Absent    Sensory:  (1)   Mild to moderate sensory loss    Best language:  (0)   Normal- no aphasia    Dysarthria:  (1)   Mild to moderate dysarthria    Extinction and inattention:  (1)   Visual, tacile, auditory, spatial, person inattention        NIHSS Total Score:  11          Dysphagia Screen  Time of screenin2018 1642  Screening results: Failed screening - strict NPO pending SLP evaluation     ASSESSMENT & PLAN BY PROBLEM     Natali Myers is a 58 year old woman presenting with sudden onset left sided weakness, facial droop, and dysarthria. NIHSS 11. Within time window of tPA and received at 1708. Concern for R M2 occlusion on CTA and was taken  to endovascular suite for thrombectomy. No prior history of documented atrial fibrillation though embolic source remains high on the differential.     Work-up for Ischemic Stroke (post TPA)      Acute Ischemic Stroke (post tPA) Plan  - Risks and benefits of tPA discussed with patient prior to administration  - Admit to Neuro ICU, under Neurocritical Care Team  - Close monitoring and neurochecks for any evidence of hemorrhagic transformation or allergic reaction  - Labetalol PRN to maintain BP < 180/105  - Euthermia, Euglycemia  - Head of bed elevated  - Hold aspirin and pharmacologic DVT prophylaxis for at least 24 hrs post-tPA  - Holding PTA statin pending swallow evaluation  - Repeat HCT 24 hrs post-tPA  - MRI brain w/o contrast  - TTE with Bubble Study  - Telemetry, EKG  - Bedside Glucose Monitoring  - A1c, Lipid Panel  - PT/OT/SLP  - PM&R  - Stroke Education  - Depression Screen  - Apnea Screen    During initial physical assessment, the plan of care was discussed and developed with patient.  Plan of care includes: tPA and endovascular treatment.    Patient was admitted via [facility] ED ([place]).    The patient will be admitted to the Neuro Critical Care/Stroke team..     Other Medical Problems:    #HLD: hold PTA atorvastatin pending SLP evaluation  #GERD: continue PTA protonix     Fluids/Electrolytes/Nutrition  0.9% sodium chloride @ 50 mL/hr  Avoid hypotonic fluids.    Nutrition: None    Prophylaxis            For VTE Prevention:  - pneumatic compression device    For Acid Suppression:  - pantoprazole    Code Status  Full Code    HPI  Natali Myers is a 58 year old female with history of HLD and right frontoparietal infarct who is presenting with sudden onset left sided weakness, facial droop, and dysarthria.  The patient was at work today and symptoms started suddenly at around 1530.  She took two aspirin and then was taken to ED for further evaluation. BP on arrival was 147/103.  STAT head CT did not  show evidence of acute hemorrhage. Risk and benefits of tPA were discussed and administered at 1708. Shortly thereafter the patient was taken to the endovascular suite for further intervention.    The patient was hospitalized in July 2016 for left leg weakness found to have evidence of stroke. She was started on  and atorvastatin 20mg at that time.     Pertinent Past Medical/Surgical History  Past Medical History:   Diagnosis Date     Arthritis     OA left hip, knees, wrists     Asthma with bronchitis      Chronic headaches      GERD (gastroesophageal reflux disease)     omprazole     Neck pain     trapezius tightness     Seasonal allergies     mold and grass     Stroke (H) July 18, 2016.    MRI scan that showed recent infarct in the central white matter of the right frontoparietal junction;       Past Surgical History:   Procedure Laterality Date     DILATION AND CURETTAGE  5/24/2014    Procedure: DILATION AND CURETTAGE;  Surgeon: Elyse Mcconnell MD;  Location: [facility] OR     HYSTERECTOMY TOTAL ABDOMINAL  6/19/2014    Procedure: HYSTERECTOMY TOTAL ABDOMINAL;  Surgeon: Elyse Mcconnell MD;  Location: [facility] OR     KNEE SURGERY  age 23     cartilage/ incision both knees     RELEASE CARPAL TUNNEL BILATERAL  1996    [place]     SALPINGO-OOPHORECTOMY BILATERAL  6/19/2014    Procedure: SALPINGO-OOPHORECTOMY BILATERAL;  Surgeon: Elyse Mcconnell MD;  Location: [facility] OR     WRIST SURGERY         Medications: I have reviewed this patient's current medications.    Allergies: All allergies reviewed and addressed.    Family History: I have reviewed this patient's family history.    Social History: I have reviewed this patient's social history.    Tobacco use: Never    ROS:  The 10 point Review of Systems is negative other than noted in the HPI or here.     PHYSICAL EXAMINATION  Vital Signs:  B/P: 147/103,  T: 97.2,  P: 101,  R: 25    General:  patient lying in bed without any acute distress    HEENT:   normocephalic/atraumatic  Cardio:  RRR  Pulmonary:  no respiratory distress  Abdomen:  non-distended  Extremities:  no edema  Skin:  intact     Neurologic  Mental Status:  fully alert, attentive and oriented, follows commands, speech clear and fluent, no evidence of aphasia  Cranial Nerves:  Left partial hemianopia, PERRL, right gaze preference, facial sensation intact and symmetric, left facial droop, hearing not formally tested but intact to conversation, palate elevation symmetric and uvula midline, dysarthria noted  Motor:  no abnormal movements, normal tone throughout, drift noted in LUE and LLE  Reflexes:  2+ and symmetric throughout  Sensory: diminished sensation to light touch in LUE and LLE  Coordination:  FNF and HS intact without dysmetria    Labs  Labs and Imaging reviewed and used in developing the plan; pertinent results included.     Lab Results   Component Value Date     (H) 12/04/2017     The patient was discussed with the fellow, Dr. Carrera.  The staff is Dr. Blackwell.    Bridger Yang MD   Neurology PGY2  Pager: [phone]

## 2018-01-31 NOTE — PROGRESS NOTES
[facility], [place]     Endovascular Surgical Neuroradiology Pre-Procedure Note      HPI:  Natali Myers is a 58 year old female with hx of stroke with no residual deficits pw RMCA syndrome. NIHSS 11. ASPECTS 9. LKW 3:10pm. S/p IV tPA. CTA w right M2 occlusion. She is undergoing stroke thrombectomy.    Medical History:  Past Medical History:   Diagnosis Date     Arthritis     OA left hip, knees, wrists     Asthma with bronchitis      Chronic headaches      GERD (gastroesophageal reflux disease)     omprazole     Neck pain     trapezius tightness     Seasonal allergies     mold and grass     Stroke (H) July 18, 2016.    MRI scan that showed recent infarct in the central white matter of the right frontoparietal junction;         Surgical History:  Past Surgical History:   Procedure Laterality Date     DILATION AND CURETTAGE  5/24/2014    Procedure: DILATION AND CURETTAGE;  Surgeon: Elyse Mcconnell MD;  [facility]     HYSTERECTOMY TOTAL ABDOMINAL  6/19/2014    Procedure: HYSTERECTOMY TOTAL ABDOMINAL;  Surgeon: Elyse Mcconnell MD;  [facility]     KNEE SURGERY  age 23     cartilage/ incision both knees     RELEASE CARPAL TUNNEL BILATERAL  1996    [place]     SALPINGO-OOPHORECTOMY BILATERAL  6/19/2014    Procedure: SALPINGO-OOPHORECTOMY BILATERAL;  Surgeon: Elyse Mcconnell MD;  [facility]     WRIST SURGERY         Family History:  Family History   Problem Relation Age of Onset     DIABETES Mother      Blood Disease Mother      KIDNEY DISEASE Mother      Cardiovascular Father      Alcohol/Drug Father      DIABETES Maternal Grandmother      Thyroid Disease Maternal Grandmother      CANCER Paternal Grandmother      eye      Asthma Brother      CEREBROVASCULAR DISEASE Father      great uncles and aunts have had CVAs     Dementia Father      Dementia Paternal Grandmother        Social History:  Social History     Social History     Marital status:      Spouse name: N/A      Number of children: N/A     Years of education: N/A     Occupational History     Not on file.     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    , mother of 4 children; 4 grandchildren       Allergies:  Allergies   Allergen Reactions     Mold      Seasonal Allergies      Hay fever     Codeine Rash     Latex Rash     Pt states this is NOT a latex allergy,allergy is neoprene rubber/in knee brace     No Clinical Screening - See Comments Rash     Neoprene rubber       Penicillins Rash       Is there a contrast allergy?  No    Medications:  Current Facility-Administered Medications   Medication     Medication Instruction - NO anti-coagulation or anti-platelet meds for 24 hours after end of IV alteplase (ACTIVASE) INFUSION [Including: heparin, enoxaparin (LOVENOX), warfarin (COUMADIN), aspirin, NSAIDs, clopidogrel (PLAVIX), dipyridamole (PERSANTINE)].     Medication Instruction: For Pharmacy Assistance Regarding ateplase (ACTIVASE) Administration: [facility]  Page: [phone] - 24 hours per day OR [facility] Page: [phone]     alteplase (ACTIVASE) infusion 81 mg    Followed by     0.9% sodium chloride BOLUS     Current Outpatient Prescriptions   Medication Sig     pantoprazole (PROTONIX) 40 MG EC tablet Take 1 tablet (40 mg) by mouth daily     atorvastatin (LIPITOR) 20 MG tablet Take 1 tablet (20 mg) by mouth daily     fluticasone (FLONASE ALLERGY RELIEF) 50 MCG/ACT spray Spray 2 sprays into both nostrils daily     cetirizine (ZYRTEC) 10 MG CHEW Take 1 tablet (10 mg) by mouth as needed     potassium chloride SA (K-DUR/KLOR-CON M) 10 MEQ CR tablet Take 1 tablet (10 mEq) by mouth daily     naproxen sodium (ALEVE) 220 MG  capsule      albuterol (PROAIR HFA/PROVENTIL HFA/VENTOLIN HFA) 108 (90 BASE) MCG/ACT Inhaler Inhale 2 puffs into the lungs every 6 hours as needed for shortness of breath / dyspnea or wheezing     triamterene-hydrochlorothiazide (DYAZIDE) 37.5-25 MG per capsule Take 1 capsule by mouth every morning (Patient not taking: Reported on 12/5/2017)     aspirin 325 MG tablet Take 1 tablet (325 mg) by mouth daily     DIPHENHYDRAMINE HCL PO Take 25 mg by mouth nightly as needed      Blood Pressure Monitoring KIT 1 Device daily Pt to check BP readings daily at home   .    ROS:  Review of systems not obtained due to patient factors - critical condition    PHYSICAL EXAMINATION  Vital Signs:  B/P: 147/103,  T: 97.2,  P: 101,  R: 25    Cardio:  RRR  Pulmonary:  no respiratory distress  Abdomen:  soft    Pre-procedure National Institutes of Health Stroke Scale:      Score    Level of consciousness: (0)   Alert, keenly responsive    LOC questions: (0)   Answers both questions correctly    LOC commands: (0)   Performs both tasks correctly    Best gaze: (1)   Partial gaze palsy    Visual: (1)   Partial hemianopia    Facial palsy: (2)   Partial paralysis (total/near total of lower face)    Motor arm (left): (2)   Some effort against gravity    Motor arm (right): (0)   No drift    Motor leg (left): (2)   Some effort against gravity    Motor leg (right): (0)   No drift    Limb ataxia: (0)   Absent    Sensory: (1)   Mild to moderate sensory loss    Best language: (0)   Normal- no aphasia    Dysarthria: (1)   Mild to moderate dysarthria    Extinction and inattention: (1)   Visual, tacile, auditory, spatial, person inattention        Total Score:  11       LABS  (most recent Cr, BUN, GFR, PLT, INR, PTT within the past 7 days):    Recent Labs  Lab 01/31/18  1643 01/31/18  1641 01/31/18  1637   CR  --   --  0.76   BUN  --   --  12   GFRESTIMATED  --  64 78   GFRESTBLACK  --  78 >90   PLT  --   --  232   INR 1.1  --  0.93        Platelet  Function P2Y12 (PRU):  Not applicable    ASSESSMENT:  RMCA syndrome with Rt M2 occlusion    PLAN:  Mechanical thrombectomy      PRE-PROCEDURE SEDATION ASSESSMENT     Pre-Procedure Sedation Assessment done at 1730.    Expected Level:  Moderate Sedation    Indication:  Sedation is required to allow for neurointerventional procedure.    Consent obtained from relative daughter after discussing the risks, benefits and alternatives.     PO Intake:  Not NPO but emergent condition outweighs risk.    ASA Class:  Class 4 - SEVERE SYSTEMIC DISEASE, ACUTE UNSTABLE PROBLEMS.    Mallampati:  Grade 3:  Soft palate visible, posterior pharyngeal wall not visible    History and physical reviewed and no updates needed. I have reviewed the lab findings, diagnostic data, medications, and the plan for sedation. I have determined this patient to be an appropriate candidate for the planned sedation and procedure and have reassessed the patient IMMEDIATELY PRIOR to sedation and procedure.    Patient was discussed with the Attending, Dr. Gibbs, who agrees with the plan.    [place] MACKENZIE Male   Pager: [phone]

## 2018-01-31 NOTE — IP AVS SNAPSHOT
MRN:[id number]                      After Visit Summary   1/31/2018    Natali Myers    MRN: [id number]           Thank you!     Thank you for choosing [facility] for your care. Our goal is always to provide you with excellent care. Hearing back from our patients is one way we can continue to improve our services. Please take a few minutes to complete the written survey that you may receive in the mail after you visit with us. Thank you!        Patient Information     Date Of Birth          1960        Designated Caregiver       Most Recent Value    Caregiver    Will someone help with your care after discharge? yes    Name of designated caregiver Robert Myers    Phone number of caregiver [phone]    Caregiver address Same as patient      About your hospital stay     You were admitted on:  January 31, 2018 You last received care in the:  [address] [facility]    You were discharged on:  February 2, 2018        Reason for your hospital stay       You were hospitalized for stroke and received a clot buster tPA and a radiologic intervention for clot with good result.                  Who to Call     For medical emergencies, please call 911.  For non-urgent questions about your medical care, please call your primary care provider or clinic, [phone]          Attending Provider     Provider Specialty    Ivy Canales MD Emergency Medicine    [facility], Pérez Piña MD Neurology       Primary Care Provider Office Phone # Fax #    Lilli Ivan [phone]      After Care Instructions     Activity       Your activity upon discharge: activity as tolerated            Diet       Follow this diet upon discharge: Regular            Discharge Instructions       You had a stroke of unknown cause, our goal is to prevent additional strokes and we recommend:   - Take plavix 75 mg daily   - Stop take aspirin 325 mg daily   - Continue taking atorvastatin 20 mg  daily     In addition, you were noted to have low potassium and we recommend that you stop taking triamterene-HCTZ until you see your doctor in a week. Continue to take your potassium supplement until you can follow-up.     If you have acute onset one sided weakness, numbness, blindness or trouble speaking, call 911 and come to the hospital immediately as this could be a stroke.                  Follow-up Appointments     Adult [facility] Follow-up and recommended labs and tests       Follow up with primary care provider, Lilli Ivan, within 7 days to evaluate medication change and revisit high blood pressure medications.  The following labs/tests are recommended: check your potassium.      Follow-up with Stroke Clinic in 1-2 months.   You will be contacted to schedule a Stroke Clinic appointment. If you have not been contacted to schedule a stroke follow-up appointment within 7 days of discharge, please contact Stroke Neurology Clinic at [phone], pick option #1.   If you have other stroke related questions, please call [phone], pick option #3, and ask to speak to Babar Chan RN Stroke/Endovascular Care Coordinator.  If you have any urgent stroke related questions after hours, please contact the hospital  at [phone] and ask to be connected to a stroke provider.   Appointments on [place] and/or [facility] campus (with [facility] or [facility] provider or service). Call [phone] if you haven't heard regarding these appointments within 7 days of discharge.                  Stroke Education     Please review the items below to help with stroke prevention.  Additional prevention suggestions are in the Understanding Stroke Handbook that you received.    Stroke risk factor changes that can help with stroke prevention:      Blood Pressure: Maintain your blood pressure within the goal the doctor sets with you.    If you are diabetic, work with your doctor to control your blood sugar and monitor your  hemoglobin A1C (HbA1c).     Your doctor may recommend a statin medication to help lower your cholesterol level.    If you have an irregular heartbeat, speak to your doctor about possible treatments.    Maintain a healthy weight.    Eat a healthy diet. We suggest a Mediterranean or heart-healthy diet, but discuss what's best for you with your doctor.    Limit alcohol consumption.    If you smoke - QUIT.  We encourage you to get support. Start by talking with your doctor. Another option is to call the Quit Plan Program at 1-532.114.1422.    Stroke  Warning Signs:     It s important to know the warning signs of stroke. Call 911 if you experience one or more warning signs like these:      Sudden numbness or weakness of the face, arm or leg, especially on one side of the body    Sudden confusion, trouble speaking or understanding    Sudden trouble seeing in one or both eyes    Sudden trouble walking, dizziness, loss of balance or coordination    Sudden severe headache with no known cause          Pending Results     Date and Time Order Name Status Description    2/2/2018 1248 Zio Patch Holter In process     1/31/2018 2051 EKG 12-lead, tracing only Preliminary     1/31/2018 1718 IR Carotid Cerebral Angiogram Bilateral Preliminary     1/31/2018 1640 EKG 12-lead, tracing only Preliminary             Statement of Approval     Ordered          02/02/18 1424  I have reviewed and agree with all the recommendations and orders detailed in this document.  EFFECTIVE NOW     Approved and electronically signed by:  Rachel Duff MD             Admission Information     Date & Time Provider Department Dept. Phone    1/31/2018 Pérez Blackwell MD [address] [facility] [phone]      Your Vitals Were     Blood Pressure Pulse Temperature Respirations Weight Last Period    147/78 (BP Location: Left arm) 101 98.4  F (36.9  C) (Oral) 16 104.6 kg (230 lb 9.6 oz) (LMP Unknown)    Pulse Oximetry BMI (Body Mass Index)                 96% 42.87 kg/m2          [employer] Information     [employer] gives you secure access to your electronic health record. If you see a primary care provider, you can also send messages to your care team and make appointments. If you have questions, please call your primary care clinic.  If you do not have a primary care provider, please call [phone] and they will assist you.        Care EveryWhere ID     This is your Care EveryWhere ID. This could be used by other organizations to access your [facility] medical records  [id number]        Equal Access to Services     KELLIE JONES : Hadii luther clifford hadasho Soomaali, waaxda luqadaha, qaybta kaalmada adesocorroyapenny, nima savage . So [facility] [phone].    ATENCIÓN: Si habla español, tiene a ojeda disposición servicios gratuitos de asistencia lingüística. Llame al [phone].    We comply with applicable federal civil rights laws and [place] laws. We do not discriminate on the basis of race, color, national origin, age, disability, sex, sexual orientation, or gender identity.               Review of your medicines      START taking        Dose / Directions    clopidogrel 75 MG tablet   Commonly known as:  PLAVIX   Used for:  Acute CVA (cerebrovascular accident) (H)        Dose:  75 mg   Take 1 tablet (75 mg) by mouth daily   Quantity:  30 tablet   Refills:  11         CONTINUE these medicines which may have CHANGED, or have new prescriptions. If we are uncertain of the size of tablets/capsules you have at home, strength may be listed as something that might have changed.        Dose / Directions    potassium chloride SA 10 MEQ CR tablet   Commonly known as:  K-DUR/KLOR-CON M   This may have changed:  how much to take   Used for:  Hypokalemia        Dose:  20 mEq   Take 2 tablets (20 mEq) by mouth daily   Quantity:  60 tablet   Refills:  1         CONTINUE these medicines which have NOT CHANGED        Dose / Directions    albuterol 108 (90  BASE) MCG/ACT Inhaler   Commonly known as:  PROAIR HFA/PROVENTIL HFA/VENTOLIN HFA   Used for:  Mild intermittent asthma without complication        Dose:  2 puff   Inhale 2 puffs into the lungs every 6 hours as needed for shortness of breath / dyspnea or wheezing   Quantity:  1 Inhaler   Refills:  1       ALEVE 220 MG capsule   Generic drug:  naproxen sodium        Refills:  0       atorvastatin 20 MG tablet   Commonly known as:  LIPITOR   Used for:  Cerebrovascular accident (CVA), unspecified mechanism (H)        Dose:  20 mg   Take 1 tablet (20 mg) by mouth daily   Quantity:  90 tablet   Refills:  3       Blood Pressure Monitoring Kit   Used for:  BMI 40.0-44.9, adult (H)        Dose:  1 Device   1 Device daily Pt to check BP readings daily at home   Quantity:  1 kit   Refills:  0       cetirizine 10 MG Chew   Commonly known as:  zyrTEC   Used for:  Chronic allergic rhinitis, unspecified seasonality, unspecified trigger        Dose:  10 mg   Take 1 tablet (10 mg) by mouth as needed   Quantity:  30 tablet   Refills:  3       DIPHENHYDRAMINE HCL PO        Dose:  25 mg   Take 25 mg by mouth nightly as needed   Refills:  0       fluticasone 50 MCG/ACT spray   Commonly known as:  FLONASE ALLERGY RELIEF   Used for:  Chronic allergic rhinitis, unspecified seasonality, unspecified trigger        Dose:  2 spray   Spray 2 sprays into both nostrils daily   Quantity:  1 Bottle   Refills:  3       pantoprazole 40 MG EC tablet   Commonly known as:  PROTONIX   Used for:  Gastroesophageal reflux disease without esophagitis        Dose:  40 mg   Take 1 tablet (40 mg) by mouth daily   Quantity:  90 tablet   Refills:  3         STOP taking     aspirin 325 MG tablet           triamterene-hydrochlorothiazide 37.5-25 MG per capsule   Commonly known as:  DYAZIDE                Where to get your medicines      These medications were sent to [facility] [place] [facility] [place], [place] - [facility] [address]  [address] [place], [facility]  [address]     Phone:  [phone]     clopidogrel 75 MG tablet         Some of these will need a paper prescription and others can be bought over the counter. Ask your nurse if you have questions.     Bring a paper prescription for each of these medications     potassium chloride SA 10 MEQ CR tablet                Protect others around you: Learn how to safely use, store and throw away your medicines at www.disposemymeds.org.             Medication List: This is a list of all your medications and when to take them. Check marks below indicate your daily home schedule. Keep this list as a reference.      Medications           Morning Afternoon Evening Bedtime As Needed    albuterol 108 (90 BASE) MCG/ACT Inhaler   Commonly known as:  PROAIR HFA/PROVENTIL HFA/VENTOLIN HFA   Inhale 2 puffs into the lungs every 6 hours as needed for shortness of breath / dyspnea or wheezing                                   ALEVE 220 MG capsule   Generic drug:  naproxen sodium                                   atorvastatin 20 MG tablet   Commonly known as:  LIPITOR   Take 1 tablet (20 mg) by mouth daily   Last time this was given:  20 mg on 2/1/2018  7:42 PM                                   Blood Pressure Monitoring Kit   1 Device daily Pt to check BP readings daily at home                                   cetirizine 10 MG Chew   Commonly known as:  zyrTEC   Take 1 tablet (10 mg) by mouth as needed                                   clopidogrel 75 MG tablet   Commonly known as:  PLAVIX   Take 1 tablet (75 mg) by mouth daily   Last time this was given:  75 mg on 2/2/2018  8:22 AM                                   DIPHENHYDRAMINE HCL PO   Take 25 mg by mouth nightly as needed                                   fluticasone 50 MCG/ACT spray   Commonly known as:  FLONASE ALLERGY RELIEF   Spray 2 sprays into both nostrils daily                                   pantoprazole 40 MG EC tablet   Commonly known as:  PROTONIX   Take 1 tablet (40 mg) by  mouth daily   Last time this was given:  40 mg on 2/2/2018  8:22 AM                                   potassium chloride SA 10 MEQ CR tablet   Commonly known as:  K-DUR/KLOR-CON M   Take 2 tablets (20 mEq) by mouth daily   Last time this was given:  20 mEq on 2/1/2018 12:10 PM

## 2018-01-31 NOTE — PHARMACY
Pharmacy Stroke Code Response    Pharmacist responded as part of the Stroke Code Team activation to patient care area: Emergency Department.      Patient weight (in kg): 114.7kg.   Weight was obtained from ED gurney weight.    The Stroke Team determined that the patient was a candidate for IV alteplase therapy and requested that alteplase be made at 16:51.    Dose of alteplase:  A total dose of 90 mg was calculated. This is to be given as a 9 mg bolus over 1 minute followed by a 81 mg infusion over 1 hour.  Calculation double check performed by: WASHINGTON Ricks.    The alteplase bolus was handed off to nursing at 16:58.    The bolus was administered at 17:05 and the infusion was initiated at 17:08.  These were administered in the ED.    The pharmacist entered the alteplase bolus and infusion drug orders into [facility].  All remaining orders will be entered by neurology.    Marleny Gallagher, BreD

## 2018-01-31 NOTE — PROGRESS NOTES
Arrival to Stroke Code: 1635    Stroke Team escalate/de-escalate interventions: 1700, TPA bolus was given by Emilia ED RN. Infusion started after.  ED Nurse providing handoff: Emilia    Time left for CT: 1643    Time Returned to ED/Unit: 1704    ED Nurse given handoff to & Time: 1710     2nd PIV started in Right AC, blood return.      Neuro exam done by MDs  BP appropriate for TPA infusion.

## 2018-01-31 NOTE — IP AVS SNAPSHOT
[address] [address]    Phone:  [phone]                                       After Visit Summary   1/31/2018    Natali Myers    MRN: [id number]           After Visit Summary Signature Page     I have received my discharge instructions, and my questions have been answered. I have discussed any challenges I see with this plan with the nurse or doctor.    ..........................................................................................................................................  Patient/Patient Representative Signature      ..........................................................................................................................................  Patient Representative Print Name and Relationship to Patient    ..................................................               ................................................  Date                                            Time    ..........................................................................................................................................  Reviewed by Signature/Title    ...................................................              ..............................................  Date                                                            Time

## 2018-02-01 ENCOUNTER — APPOINTMENT (OUTPATIENT)
Dept: SPEECH THERAPY | Facility: CLINIC | Age: 58
End: 2018-02-01
Attending: STUDENT IN AN ORGANIZED HEALTH CARE EDUCATION/TRAINING PROGRAM
Payer: COMMERCIAL

## 2018-02-01 ENCOUNTER — APPOINTMENT (OUTPATIENT)
Dept: CARDIOLOGY | Facility: CLINIC | Age: 58
End: 2018-02-01
Attending: STUDENT IN AN ORGANIZED HEALTH CARE EDUCATION/TRAINING PROGRAM
Payer: COMMERCIAL

## 2018-02-01 ENCOUNTER — APPOINTMENT (OUTPATIENT)
Dept: MRI IMAGING | Facility: CLINIC | Age: 58
End: 2018-02-01
Attending: STUDENT IN AN ORGANIZED HEALTH CARE EDUCATION/TRAINING PROGRAM
Payer: COMMERCIAL

## 2018-02-01 LAB
ANION GAP SERPL CALCULATED.3IONS-SCNC: 10 MMOL/L (ref 3–14)
BUN SERPL-MCNC: 10 MG/DL (ref 7–30)
CALCIUM SERPL-MCNC: 8.2 MG/DL (ref 8.5–10.1)
CHLORIDE SERPL-SCNC: 108 MMOL/L (ref 94–109)
CHOLEST SERPL-MCNC: 100 MG/DL
CO2 SERPL-SCNC: 25 MMOL/L (ref 20–32)
CREAT SERPL-MCNC: 0.72 MG/DL (ref 0.52–1.04)
ERYTHROCYTE [DISTWIDTH] IN BLOOD BY AUTOMATED COUNT: 13.2 % (ref 10–15)
GFR SERPL CREATININE-BSD FRML MDRD: 83 ML/MIN/1.7M2
GLUCOSE SERPL-MCNC: 96 MG/DL (ref 70–99)
HCT VFR BLD AUTO: 38.6 % (ref 35–47)
HDLC SERPL-MCNC: 47 MG/DL
HGB BLD-MCNC: 13.2 G/DL (ref 11.7–15.7)
LDLC SERPL CALC-MCNC: 35 MG/DL
MAGNESIUM SERPL-MCNC: 2 MG/DL (ref 1.6–2.3)
MCH RBC QN AUTO: 32.4 PG (ref 26.5–33)
MCHC RBC AUTO-ENTMCNC: 34.2 G/DL (ref 31.5–36.5)
MCV RBC AUTO: 95 FL (ref 78–100)
NONHDLC SERPL-MCNC: 53 MG/DL
NT-PROBNP SERPL-MCNC: 183 PG/ML (ref 0–900)
PHOSPHATE SERPL-MCNC: 3.4 MG/DL (ref 2.5–4.5)
PLATELET # BLD AUTO: 186 10E9/L (ref 150–450)
POTASSIUM BLD-SCNC: 3.5 MMOL/L (ref 3.4–5.3)
POTASSIUM SERPL-SCNC: 3.8 MMOL/L (ref 3.4–5.3)
RBC # BLD AUTO: 4.08 10E12/L (ref 3.8–5.2)
SODIUM SERPL-SCNC: 143 MMOL/L (ref 133–144)
TRIGL SERPL-MCNC: 91 MG/DL
WBC # BLD AUTO: 8.1 10E9/L (ref 4–11)

## 2018-02-01 PROCEDURE — 80061 LIPID PANEL: CPT | Performed by: STUDENT IN AN ORGANIZED HEALTH CARE EDUCATION/TRAINING PROGRAM

## 2018-02-01 PROCEDURE — 12000001 ZZH R&B MED SURG/OB UMMC

## 2018-02-01 PROCEDURE — 80048 BASIC METABOLIC PNL TOTAL CA: CPT | Performed by: STUDENT IN AN ORGANIZED HEALTH CARE EDUCATION/TRAINING PROGRAM

## 2018-02-01 PROCEDURE — 84100 ASSAY OF PHOSPHORUS: CPT | Performed by: PSYCHIATRY & NEUROLOGY

## 2018-02-01 PROCEDURE — 25000132 ZZH RX MED GY IP 250 OP 250 PS 637: Performed by: STUDENT IN AN ORGANIZED HEALTH CARE EDUCATION/TRAINING PROGRAM

## 2018-02-01 PROCEDURE — 25000128 H RX IP 250 OP 636: Performed by: STUDENT IN AN ORGANIZED HEALTH CARE EDUCATION/TRAINING PROGRAM

## 2018-02-01 PROCEDURE — 40000556 ZZH STATISTIC PERIPHERAL IV START W US GUIDANCE

## 2018-02-01 PROCEDURE — 93306 TTE W/DOPPLER COMPLETE: CPT

## 2018-02-01 PROCEDURE — 92610 EVALUATE SWALLOWING FUNCTION: CPT | Mod: GN | Performed by: SPEECH-LANGUAGE PATHOLOGIST

## 2018-02-01 PROCEDURE — 84132 ASSAY OF SERUM POTASSIUM: CPT | Performed by: PSYCHIATRY & NEUROLOGY

## 2018-02-01 PROCEDURE — 25000125 ZZHC RX 250: Performed by: STUDENT IN AN ORGANIZED HEALTH CARE EDUCATION/TRAINING PROGRAM

## 2018-02-01 PROCEDURE — 85027 COMPLETE CBC AUTOMATED: CPT | Performed by: STUDENT IN AN ORGANIZED HEALTH CARE EDUCATION/TRAINING PROGRAM

## 2018-02-01 PROCEDURE — 83735 ASSAY OF MAGNESIUM: CPT | Performed by: STUDENT IN AN ORGANIZED HEALTH CARE EDUCATION/TRAINING PROGRAM

## 2018-02-01 PROCEDURE — 70551 MRI BRAIN STEM W/O DYE: CPT

## 2018-02-01 PROCEDURE — 40000225 ZZH STATISTIC SLP WARD VISIT: Performed by: SPEECH-LANGUAGE PATHOLOGIST

## 2018-02-01 PROCEDURE — 36415 COLL VENOUS BLD VENIPUNCTURE: CPT | Performed by: PSYCHIATRY & NEUROLOGY

## 2018-02-01 PROCEDURE — 83880 ASSAY OF NATRIURETIC PEPTIDE: CPT | Performed by: STUDENT IN AN ORGANIZED HEALTH CARE EDUCATION/TRAINING PROGRAM

## 2018-02-01 PROCEDURE — 93306 TTE W/DOPPLER COMPLETE: CPT | Mod: 26 | Performed by: INTERNAL MEDICINE

## 2018-02-01 PROCEDURE — 84100 ASSAY OF PHOSPHORUS: CPT | Performed by: STUDENT IN AN ORGANIZED HEALTH CARE EDUCATION/TRAINING PROGRAM

## 2018-02-01 RX ORDER — ATORVASTATIN CALCIUM 20 MG/1
20 TABLET, FILM COATED ORAL
Status: DISCONTINUED | OUTPATIENT
Start: 2018-02-01 | End: 2018-02-02 | Stop reason: HOSPADM

## 2018-02-01 RX ORDER — CLOPIDOGREL BISULFATE 75 MG/1
75 TABLET ORAL DAILY
Status: DISCONTINUED | OUTPATIENT
Start: 2018-02-02 | End: 2018-02-02 | Stop reason: HOSPADM

## 2018-02-01 RX ORDER — PANTOPRAZOLE SODIUM 40 MG/1
40 TABLET, DELAYED RELEASE ORAL EVERY MORNING
Status: DISCONTINUED | OUTPATIENT
Start: 2018-02-02 | End: 2018-02-02 | Stop reason: HOSPADM

## 2018-02-01 RX ADMIN — Medication 10 MEQ: at 01:20

## 2018-02-01 RX ADMIN — PANTOPRAZOLE SODIUM 40 MG: 40 INJECTION, POWDER, FOR SOLUTION INTRAVENOUS at 08:58

## 2018-02-01 RX ADMIN — Medication 10 MEQ: at 02:21

## 2018-02-01 RX ADMIN — Medication 2 G: at 08:13

## 2018-02-01 RX ADMIN — ATORVASTATIN CALCIUM 20 MG: 20 TABLET, FILM COATED ORAL at 19:42

## 2018-02-01 RX ADMIN — POTASSIUM CHLORIDE 20 MEQ: 750 TABLET, EXTENDED RELEASE ORAL at 12:10

## 2018-02-01 RX ADMIN — Medication 10 MEQ: at 03:22

## 2018-02-01 RX ADMIN — Medication 10 MEQ: at 00:15

## 2018-02-01 ASSESSMENT — VISUAL ACUITY
OU: GLASSES

## 2018-02-01 NOTE — CONSULTS
Social Work: Assessment with Discharge Plan    Patient Name:  Natali Myers  :  1960  Age:  58 year old  MRN:  [id number]  Risk/Complexity Score:  Filed Complexity Screen Score: 4  Completed assessment with:  Pt, , daughter    Presenting Information   Reason for Referral:  Stroke consult  Date of Intake:  2018  Referral Source:  Physician  Decision Maker:  Pt  Alternate Decision Maker:  Per Pt, she has a HPOA and named her  as agent.  Health Care Directive:  Will bring in copy  Living Situation:  House; Pt lives with her  in a single-level house in [place] on the weekends. During the week, Pt drives to [place] for work and stays in a town house with a few steps to enter and a flight of stairs to the second floor bedroom and bathroom.  Previous Functional Status:  Independent; Pt has been independent, driving from [place] to [place] weekly for work. 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  Patient and family understanding of hospitalization:  Pt and family are aware of Pt's stroke. Pt states that the team is working to find the source of the stroke as this is Pt's second stroke.  Cultural/Language/Spiritual Considerations:  Pt's primary language is English.  Adjustment to Illness:  Pt appears to be appropriately adjusted. She has a positive attitude regarding her recovery and expects to be discharged to home.    Physical Health  Reason for Admission:    1. Acute CVA (cerebrovascular accident) (H)      Services Needed/Recommended:  Home with no services     Support System  Significant relationship at present time:  , daughter, son  Family of origin is available for support:  Yes, Pt's  and daughter are available for support. Pt's son lives out of the country.  Other support available:  None noted.  Gaps in support system:  None noted.  Patient is caregiver to:  None      Provider Information   Primary Care Physician:  Lilli Ivan   [phone]   Clinic:  [address] [place] [address] 4 / [facility] [address]      :  None    Financial   Income Source:  Working full-time as an .  Financial Concerns:  None noted. Pt states she has disability insurance if needed.  Insurance:    Payor/Plan Subscriber Name Rel Member # Group #   BCBS - BCBS OUT OF * SUZAN RODRIGUEZ  [id number]       [id number]       Discharge Plan   Patient and family discharge goal:  Return home at normal level of function.  Provided education on discharge plan:  YES  Patient agreeable to discharge plan:  Pending recommendations.  A list of Medicare Certified Facilities was provided to the patient and/or family to encourage patient choice. Patient's choices for facility are:  N/A  Will NH provide Skilled rehabilitation or complex medical:  N/A  General information regarding anticipated insurance coverage and possible out of pocket cost was discussed. Patient and patient's family are aware patient may incur the cost of transportation to the facility, pending insurance payment: N/A  Barriers to discharge:  Medical clearance.    Discharge Recommendations   Anticipated Disposition:  Home, no needs identified  Transportation Needs:  Family:    Name of Transportation Company and Phone:  N/A    Additional comments   Pt feels that she is doing well and does not anticipate needing any rehab. She does anticipate follow-up with neurology. SW will remain available as needed.      Maria Del Carmen Frazier [facility]  ICU   Pager: [phone]

## 2018-02-01 NOTE — PROGRESS NOTES
D: Patient arrived at 1730. Table ready at 1730              Natali Myers underwent a  Cerebral  angiogram  by Dr Fu on 1/31/2018   Time out done. Yes               Patient identity confirmed with 2 identifiers  Yes               Site marked No              Support staff present for case :Dr. Sai Gonzalez and Dr. Pinto arrived at 1730.                                                           RN Leonidas RN arrived at 1730.                                                           Tech Stevie RTR Lencho RTR  arrived at 1730.    A:  Patient moved to table, monitoring equipment applied. Insertion site prepared by technologist.    Start time of procedure: 1740   Puncture made to : Rgiht groin   At  1740    Intervention done:Medications Thrombectomy-Solitare/Penumbra   Sedation Medication given: Yes, see MAR                Medication total dose given- Versed  2 mg                                                        Fentanyl  100  Mcg                Sed Time: 80 min                                                                                                                                                                                            Other IV TPA infusing from ED-completed at 1800 with flush   IR RN Monitoring Times: Start:1730                                                   Stop 1915     Introducer removed, Manual pressure held for 10 minutes,   Closure device deployed    Groin site appearance : WDL   End time of procedure : 1900   Additional Puncture site(s): NA    R:  Patient tolerated procedure without any noted complications. Pt transported to ICU with RN escort and neuro exam done at bedside with ICU RN.        P:  Patient to recover in 4A   Follow up : As needed per primary team  Post Procedure Education:  The following information has been reviewed with pt and family   1. Maintain bedrest with right extremity straight until 2100.   2. Notify nurse immediately if  having any bleeding from site, any changes in sensation,or changes in strength.   3. Notify nurse if you have to go the bathroom, the staff will assist you.   4. Nurses will be doing frequent assessments post procedure, which will include                   checking the pulses in your feet, groin/access site, vital signs, and neuro exam.    5. Please press your call light if you, or your family, have any questions or concerns        regarding your care.    Learner's response to angiogram education:patient needs reinforcement due to sedation and situation.

## 2018-02-01 NOTE — PROGRESS NOTES
02/01/18 0941   General Information   Onset Date 01/31/18   Start of Care Date 02/01/18   Referring Physician Marisa Gil MD    Patient Profile Review/OT: Additional Occupational Profile Info See Profile for full history and prior level of function   Patient/Family Goals Statement Pt would like to order breakfast.     Swallowing Evaluation Bedside swallow evaluation   Behaviorial Observations WFL (within functional limits)   Mode of current nutrition NPO   Respiratory Status O2 Supply   Type of O2 supply Nasal cannula   Comments Natali Myers is a 58 year old female with hx of stroke with no residual deficits pw RMCA syndrome. NIHSS 11. ASPECTS 9. LKW 3:10pm. S/p IV tPA. CTA w right M2 occlusion. She is undergoing stroke thrombectomy.   Clinical Swallow Evaluation   Oral Musculature generally intact  (Slight L facial droop; pt report baseline from prior stroke )   Structural Abnormalities none present   Dentition present and adequate   Mucosal Quality adequate   Mandibular Strength and Mobility intact   Oral Labial Strength and Mobility WFL   Lingual Strength and Mobility WFL   Buccal Strength and Mobility intact   Laryngeal Function Cough;Throat clear;Voicing initiated;Swallow;Dry swallow palpated   Oral Musculature Comments WFL   Additional Documentation Yes   Clinical Swallow Eval: Thin Liquid Texture Trial   Mode of Presentation, Thin Liquids straw;self-fed   Volume of Liquid or Food Presented 6 oz water    Oral Phase of Swallow WFL   Pharyngeal Phase of Swallow intact   Diagnostic Statement Functional swallow for thin liquids.     Clinical Swallow Eval: Puree Solid Texture Trial   Mode of Presentation, Puree spoon;self-fed   Volume of Puree Presented 2 oz applesauce    Oral Phase, Puree WFL   Pharyngeal Phase, Puree intact   Diagnostic Statement Functional swallow for pureed textures.     Clinical Swallow Eval: Solid Food Texture Trial   Mode of Presentation, Solid self-fed   Volume of Solid Food  Presented 2 teodoro crackers    Oral Phase, Solid WFL   Pharyngeal Phase, Solid intact   Diagnostic Statement Functional swallow for solid textures.    Swallow Compensations   Swallow Compensations No compensations were used   Esophageal Phase of Swallow   Esophageal sweep performed during today s vidofluoroscopic exam  No   Swallow Eval: Clinical Impressions   Skilled Criteria for Therapy Intervention No problems identified which require skilled intervention   Functional Assessment Scale (FAS) 7   Treatment Diagnosis R/o oropharyngeal dysphagia    Diet texture recommendations Regular diet;Thin liquids   Demonstrates Need for Referral to Another Service occupational therapy;physical therapy   Anticipated Discharge Disposition home   Risks and Benefits of Treatment have been explained. Yes   Patient, family and/or staff in agreement with Plan of Care Yes   Clinical Impression Comments Pt seen bedside for dysphagia evaluation per MD orders.  Pt presents with oral and pharyngeal swallow responses that fall WNL.  Pt tolerated trials of thin liquids, pureed textures, and solid textures without overt s/s of aspiration.  Pt denies difficulty or changes with swallow function.  Recommend advance diet to regular textures and thin liquids.  Pt is not demonstrating a need for further dysphagia intervention.     Total Evaluation Time   Total Evaluation Time (Minutes) 15

## 2018-02-01 NOTE — PLAN OF CARE
Problem: Patient Care Overview  Goal: Plan of Care/Patient Progress Review  Discharge Planner SLP   Patient plan for discharge: Unknown   Current status: Pt seen bedside for dysphagia evaluation per MD orders.  Pt presents with oral and pharyngeal swallow responses that fall WNL.  Pt tolerated trials of thin liquids, pureed textures, and solid textures without overt s/s of aspiration.  Pt denies difficulty or changes with swallow function.  Recommend advance diet to regular textures and thin liquids.  Pt is not demonstrating a need for further dysphagia intervention.  Speech and language skills screened without deficits noted; pt reports language and cognition at baseline from prior stroke.    Barriers to return to prior living situation: None   Recommendations for discharge: Home   Rationale for recommendations: No SLP needs identified        Entered by: Farhana Torrez 02/01/2018 9:49 AM

## 2018-02-01 NOTE — PROGRESS NOTES
Post treatment NIHSS    National Institutes of Health Stroke Scale  Exam Interval: post thrombectomy   Score    Level of consciousness: (0)   Alert, keenly responsive    LOC questions: (0)   Answers both questions correctly    LOC commands: (0)   Performs both tasks correctly    Best gaze: (0)   Normal    Visual: (0)   No visual loss    Facial palsy: (2)   Partial paralysis (total/near total of lower face)    Motor arm (left): (0)   No drift    Motor arm (right): (0)   No drift    Motor leg (left): (0)   No drift    Motor leg (right): (0)   No drift    Limb ataxia: (0)   Absent    Sensory: (0)   Normal- no sensory loss    Best language: (0)   Normal- no aphasia    Dysarthria: (1)   Mild to moderate dysarthria    Extinction and inattention: (0)   No abnormality        Total Score:  3       Ely Mchugh PGY-4

## 2018-02-01 NOTE — PLAN OF CARE
Problem: Patient Care Overview  Goal: Plan of Care/Patient Progress Review  4A - Pt on bedrest until 5PM 2/1 due to tpa administered at 5PM on 1/31

## 2018-02-01 NOTE — PROGRESS NOTES
Neuroscience Intensive Care Progress Note    2018    Natali Myers is a 58 year old woman with a history of right frontoparietal infarct who was admitted on  for R M2 occlusion s/p tPA and thrombectomy TICI 3.    Problem List  1.  Right MCA stroke  2.  History of right frontoparietal stroke  3.  HLD    24 hour events: tPA at 1708 and thrombectomy    24 Hour Vital Signs Summary:  Temperatures:  Current - Temp: 97.8  F (36.6  C); Max - Temp  Av.7  F (36.5  C)  Min: 97.2  F (36.2  C)  Max: 98  F (36.7  C)  Respiration range: Resp  Av.2  Min: 12  Max: 25  Pulse range: Pulse  Av  Min: 101  Max: 101  Blood pressure range: Systolic (24hrs), Av , Min:112 , Max:177   ; Diastolic (24hrs), Av, Min:61, Max:103    Pulse oximetry range: SpO2  Av.8 %  Min: 94 %  Max: 99 %    Ventilator Settings  Resp: 13    Intake/Output Summary (Last 24 hours) at 18 0702  Last data filed at 18 0700   Gross per 24 hour   Intake              675 ml   Output             1200 ml   Net             -525 ml       BP - Mean:  [] 78    Current Medications:    sodium chloride 0.9%  100 mL Intravenous Once     pantoprazole  40 mg Intravenous QAM AC       PRN Medications:  - MEDICATION INSTRUCTIONS -, - MEDICATION INSTRUCTIONS -, naloxone, lidocaine (buffered or not buffered), lidocaine 4%, labetalol, potassium chloride, potassium chloride, potassium chloride, potassium chloride with lidocaine, potassium chloride, magnesium sulfate, magnesium sulfate, - MEDICATION INSTRUCTIONS -, - MEDICATION INSTRUCTIONS -, albuterol, HOLD MEDICATION, ondansetron **OR** ondansetron, prochlorperazine **OR** prochlorperazine **OR** prochlorperazine, metoclopramide **OR** metoclopramide, potassium phosphate (KPHOS) in D5W IV, potassium phosphate (KPHOS) in D5W IV, potassium phosphate (KPHOS) in D5W IV, potassium phosphate (KPHOS) in D5W IV, potassium phosphate (KPHOS) in D5W IV    Infusions:    - MEDICATION  INSTRUCTIONS -       - MEDICATION INSTRUCTIONS -       - MEDICATION INSTRUCTIONS -       - MEDICATION INSTRUCTIONS -       NaCl 75 mL/hr at 01/31/18 2118       Allergies   Allergen Reactions     Mold      Seasonal Allergies      Hay fever     Codeine Rash     Latex Rash     Pt states this is NOT a latex allergy,allergy is neoprene rubber/in knee brace     No Clinical Screening - See Comments Rash     Neoprene rubber       Penicillins Rash     Physical Examination:  /67 (BP Location: Left arm)  Pulse 101  Temp 97.8  F (36.6  C) (Oral)  Resp 13  Wt 104.6 kg (230 lb 9.6 oz)  LMP  (LMP Unknown)  SpO2 97%  BMI 42.87 kg/m2    General:  patient lying in bed without any acute distress    HEENT:  normocephalic/atraumatic  Cardio:  RRR  Pulmonary:  no respiratory distress  Abdomen:  non-distended  Extremities:  no edema  Skin:  intact      Neurologic  Mental Status:  fully alert, attentive and oriented, follows commands, speech clear and fluent, no evidence of aphasia  Cranial Nerves:  VFF, PERRL, EOMI, facial sensation intact and symmetric, mild left facial droop, hearing not formally tested but intact to conversation, palate elevation symmetric and uvula midline, subtle dysarthria noted  Motor:  no abnormal movements, normal tone throughout, no drift, strength 5/5 throughout  Reflexes:  2+ and symmetric throughout  Sensory: intact to light touch throughout  Coordination:  FNF and HS intact without dysmetria    National Institutes of Health Stroke Scale   Score    Level of consciousness: (0)   Alert, keenly responsive    LOC questions: (0)   Answers both questions correctly    LOC commands: (0)   Performs both tasks correctly    Best gaze: (0)   Normal    Visual: (0)   No visual loss    Facial palsy: (1)   Minor paralysis (flat nasolabial fold, smile asymmetry)    Motor arm (left): (0)   No drift    Motor arm (right): (0)   No drift    Motor leg (left): (0)   No drift    Motor leg (right): (0)   No drift    Limb  ataxia: (0)   Absent    Sensory: (0)   Normal- no sensory loss    Best language: (0)   Normal- no aphasia    Dysarthria: (1)   Mild to moderate dysarthria    Extinction and inattention: (0)   No abnormality        Total Score:  2     Labs/Studies:  Recent Labs   Lab Test  02/01/18   0508  01/31/18   2159  01/31/18   1644  01/31/18   1637   NA  143  139  138  138   POTASSIUM  3.8  3.0*  3.0*  2.9*   CHLORIDE  108  103   --   100   CO2  25  24   --   26   ANIONGAP  10  11   --   12   GLC  96  141*  139*  131*   BUN  10  12   --   12   CR  0.72  0.70   --   0.76   ADEN  8.2*  8.3*   --   9.0   WBC  8.1  10.8   --   10.3   RBC  4.08  4.32   --   4.63   HGB  13.2  14.1  14.6  15.2   PLT  186  191   --   232       Recent Labs   Lab Test  01/31/18   2159  01/31/18   1643  01/31/18   1637  05/23/14   1508   INR  1.13  1.1  0.93  1.05   PTT  27   --    --   22     Imaging:    Assessment/Plan  Natali Myers is a 58 year old woman with a history of right frontoparietal infarct who was admitted on 1/31 for R M2 occlusion s/p tPA and thrombectomy TICI 3.     Plan  Neuro:  #Acute ischemic stroke: R M2 occlusion s/p tPA and thrombectomy TICI 3. Exam much improved today. NIHSS 11->2. Etiology concerning for embolic source. No evidence of prior atrial fibrillation however will need further cardiac monitoring on discharge.   - Close monitoring and neurochecks for any evidence of hemorrhagic transformation or allergic reaction  - Labetalol PRN to maintain BP < 180/105  - Euthermia, Euglycemia  - Head of bed elevated  - Hold aspirin and pharmacologic DVT prophylaxis for at least 24 hrs post-tPA  - Discontinue PTA aspirin and start plavix 75mg  - Continue PTA atorvastatin  - MRI brain w/o contrast  - TTE with Bubble Study  - Telemetry, EKG  - Ziopatch on discharge  - Bedside Glucose Monitoring  - PT/OT/SLP  - PM&R  - Stroke Education  - Depression Screen  - Apnea Screen    Resp: No acute concerns    CV:  -SBP < 180    #HLD: continue  PTA atorvastatin 20mg    Renal: no acute concerns  - Electrolyte protocol    Endo: no history of T2DM. HbA1c 5.6 on admission.    Heme: No acute concerns    GI:   #GERD:  Continue PTA protonix    ID: No acute concerns    FEN: Regular diet  PPX:    DVT prophylaxis: SCDs    GI: not indicated  Code Status: Full    Dispo: Transfer to  at 1700    Patient seen and discussed with staff Dr. Rosalva Yang MD  Neurology PGY2  [phone]

## 2018-02-01 NOTE — PLAN OF CARE
Problem: Patient Care Overview  Goal: Plan of Care/Patient Progress Review  OT 4AB: Cancel.  Acknowledge order placed for OT eval and treat.  Pt on bedrest until 5PM today due to administration of tPA, Will initiate therapies once activity orders are advanced

## 2018-02-01 NOTE — PROGRESS NOTES
[facility], [place]     Endovascular Surgical Neuroradiology Post-Procedure Note    Pre-Procedure Diagnosis:  Right MCA ischemic stroke  Post-Procedure Diagnosis:  Right MCA ischemic stroke    Procedure(s):   Endovascular treatment of acute ischemic stroke    - TICI Score: 3    Findings:  Right Superior division M2 thrombus noted. S/P Thrombectomy Single pass - TICI 3. 10 mg IA verapamil given for vasospasm that improved after verapamil injection.     Plan:  Per Stroke/ ICU team     Primary Surgeon:  Dr. Stone Gibbs  Secondary Surgeon:  Not applicable  Secondary Surgeon Review:  None  Fellow:  Lisa Purdy Mehta  Additional Assistants:  ADRIANA    Prior to the start of the procedure and with procedural staff participation, I verbally confirmed: the patient s identity using two indicators, relevant allergies, that the procedure was appropriate and matched the consent or emergent situation, and that the correct equipment/implants were available. Immediately prior to starting the procedure I conducted the Time Out with the procedural staff and re-confirmed the patient s name, procedure, and site/side. (The Joint Commission universal protocol was followed.)  No    PRU value: Not applicable    Anesthesia:  Conscious Sedation  Medications:  Fentanyl, Midazolam 100, 2, Verapamil 10  Puncture site:  Right Femoral Artery    Fluoroscopy time (minutes):  37  Radiation dose (mGy):  1180    Contrast amount (mL):  100     Estimated blood loss (mL):  minimal    Closure:  Device Angio Seal     Disposition:  Will be followed in hospital by the Neuro Critical Care/Stroke team.        Sedation Post-Procedure Summary    Sedatives: Fentanyl and Midazolam (Versed)    Vital signs and pulse oximetry were monitored and remained stable throughout the procedure, and sedation was maintained until the procedure was complete.  The patient was monitored by staff until sedation discharge criteria were met.    Patient  tolerance:  Patient tolerated the procedure well with no immediate complications.    Time of sedation in minutes: 80 minutes from beginning to end of physician one to one monitoring.    Zi Mane  Pager:  [phone]

## 2018-02-01 NOTE — PLAN OF CARE
"Neuro:  When patient arrived on the unit at 1930, a left side facial droop, tongue deviated to the left, and somewhat slurred speech was noted. As the night progressed these symptoms cleared up. As of 0226: Patient is A&O X4, Pupils are equal and reactive, cranial nerves are intact. Tongue extension midline. Slight left side facial droop, which has improved as the night progresses. Able to shrug shoulders and shake head \"no\". Pronator drift is absent.  and push pulls are strong. Patient denies pain and denies numbness and tingling.     Cardiac: Sinus rhythm. Afebrile. Blood pressures have remained within parameters.     Pulmonary: Lungs clear/ diminished. Was on room air but placed on 2L NC for sleeping.     GI/: NPO. MD at bedside earlier in the night, stated patient could have ice chips. Waiting for SLP swallow eval in AM.   Patient voids spontaneously without difficulty. Uses bedside commode. Clean catch urine sample sent. No BM as of 0230. Denies passing gas.    Skin: Bottom is intact.     2 PIV in right arm.    Gtt: NS at 75 and TKO with meds.     Plan to continue to do Q30 min neuro checks. Q1 hr neuro checks start at 0300 and continue until 1900 on 2/1.   "

## 2018-02-02 ENCOUNTER — APPOINTMENT (OUTPATIENT)
Dept: OCCUPATIONAL THERAPY | Facility: CLINIC | Age: 58
End: 2018-02-02
Attending: STUDENT IN AN ORGANIZED HEALTH CARE EDUCATION/TRAINING PROGRAM
Payer: COMMERCIAL

## 2018-02-02 VITALS
WEIGHT: 230.6 LBS | TEMPERATURE: 98.4 F | OXYGEN SATURATION: 96 % | DIASTOLIC BLOOD PRESSURE: 78 MMHG | BODY MASS INDEX: 42.87 KG/M2 | HEART RATE: 101 BPM | RESPIRATION RATE: 16 BRPM | SYSTOLIC BLOOD PRESSURE: 147 MMHG

## 2018-02-02 LAB
ANION GAP SERPL CALCULATED.3IONS-SCNC: 8 MMOL/L (ref 3–14)
BUN SERPL-MCNC: 11 MG/DL (ref 7–30)
CALCIUM SERPL-MCNC: 8.5 MG/DL (ref 8.5–10.1)
CHLORIDE SERPL-SCNC: 107 MMOL/L (ref 94–109)
CO2 SERPL-SCNC: 26 MMOL/L (ref 20–32)
CREAT SERPL-MCNC: 0.78 MG/DL (ref 0.52–1.04)
ERYTHROCYTE [DISTWIDTH] IN BLOOD BY AUTOMATED COUNT: 13.4 % (ref 10–15)
GFR SERPL CREATININE-BSD FRML MDRD: 75 ML/MIN/1.7M2
GLUCOSE BLDC GLUCOMTR-MCNC: 118 MG/DL (ref 70–99)
GLUCOSE SERPL-MCNC: 203 MG/DL (ref 70–99)
HCT VFR BLD AUTO: 39.6 % (ref 35–47)
HGB BLD-MCNC: 13.6 G/DL (ref 11.7–15.7)
INTERPRETATION ECG - MUSE: NORMAL
INTERPRETATION ECG - MUSE: NORMAL
MCH RBC QN AUTO: 33 PG (ref 26.5–33)
MCHC RBC AUTO-ENTMCNC: 34.3 G/DL (ref 31.5–36.5)
MCV RBC AUTO: 96 FL (ref 78–100)
PLATELET # BLD AUTO: 169 10E9/L (ref 150–450)
POTASSIUM SERPL-SCNC: 3.6 MMOL/L (ref 3.4–5.3)
RBC # BLD AUTO: 4.12 10E12/L (ref 3.8–5.2)
SODIUM SERPL-SCNC: 141 MMOL/L (ref 133–144)
WBC # BLD AUTO: 6.1 10E9/L (ref 4–11)

## 2018-02-02 PROCEDURE — 80048 BASIC METABOLIC PNL TOTAL CA: CPT | Performed by: STUDENT IN AN ORGANIZED HEALTH CARE EDUCATION/TRAINING PROGRAM

## 2018-02-02 PROCEDURE — 0296T ZIO PATCH HOLTER: CPT | Performed by: STUDENT IN AN ORGANIZED HEALTH CARE EDUCATION/TRAINING PROGRAM

## 2018-02-02 PROCEDURE — 25000132 ZZH RX MED GY IP 250 OP 250 PS 637: Performed by: STUDENT IN AN ORGANIZED HEALTH CARE EDUCATION/TRAINING PROGRAM

## 2018-02-02 PROCEDURE — 97165 OT EVAL LOW COMPLEX 30 MIN: CPT | Mod: GO

## 2018-02-02 PROCEDURE — 40000133 ZZH STATISTIC OT WARD VISIT

## 2018-02-02 PROCEDURE — 25000132 ZZH RX MED GY IP 250 OP 250 PS 637: Performed by: NURSE PRACTITIONER

## 2018-02-02 PROCEDURE — 25000132 ZZH RX MED GY IP 250 OP 250 PS 637: Performed by: PSYCHIATRY & NEUROLOGY

## 2018-02-02 PROCEDURE — 00000146 ZZHCL STATISTIC GLUCOSE BY METER IP

## 2018-02-02 PROCEDURE — 85027 COMPLETE CBC AUTOMATED: CPT | Performed by: STUDENT IN AN ORGANIZED HEALTH CARE EDUCATION/TRAINING PROGRAM

## 2018-02-02 PROCEDURE — 36415 COLL VENOUS BLD VENIPUNCTURE: CPT | Performed by: STUDENT IN AN ORGANIZED HEALTH CARE EDUCATION/TRAINING PROGRAM

## 2018-02-02 RX ORDER — POTASSIUM CHLORIDE 750 MG/1
20-40 TABLET, EXTENDED RELEASE ORAL
Status: DISCONTINUED | OUTPATIENT
Start: 2018-02-02 | End: 2018-02-02 | Stop reason: HOSPADM

## 2018-02-02 RX ORDER — CLOPIDOGREL BISULFATE 75 MG/1
75 TABLET ORAL DAILY
Qty: 30 TABLET | Refills: 11 | Status: SHIPPED | OUTPATIENT
Start: 2018-02-02 | End: 2018-02-02

## 2018-02-02 RX ORDER — POTASSIUM CHLORIDE 29.8 MG/ML
20 INJECTION INTRAVENOUS
Status: DISCONTINUED | OUTPATIENT
Start: 2018-02-02 | End: 2018-02-02 | Stop reason: HOSPADM

## 2018-02-02 RX ORDER — POTASSIUM CHLORIDE 1.5 G/1.58G
20-40 POWDER, FOR SOLUTION ORAL
Status: DISCONTINUED | OUTPATIENT
Start: 2018-02-02 | End: 2018-02-02 | Stop reason: HOSPADM

## 2018-02-02 RX ORDER — CLOPIDOGREL BISULFATE 75 MG/1
75 TABLET ORAL DAILY
Qty: 30 TABLET | Refills: 11 | Status: SHIPPED | OUTPATIENT
Start: 2018-02-02 | End: 2018-11-20

## 2018-02-02 RX ORDER — POTASSIUM CHLORIDE 7.45 MG/ML
10 INJECTION INTRAVENOUS
Status: DISCONTINUED | OUTPATIENT
Start: 2018-02-02 | End: 2018-02-02 | Stop reason: RX

## 2018-02-02 RX ORDER — POTASSIUM CL/LIDO/0.9 % NACL 10MEQ/0.1L
10 INTRAVENOUS SOLUTION, PIGGYBACK (ML) INTRAVENOUS
Status: DISCONTINUED | OUTPATIENT
Start: 2018-02-02 | End: 2018-02-02 | Stop reason: HOSPADM

## 2018-02-02 RX ORDER — POTASSIUM CHLORIDE 750 MG/1
20 TABLET, EXTENDED RELEASE ORAL DAILY
Qty: 60 TABLET | Refills: 1 | Status: SHIPPED | OUTPATIENT
Start: 2018-02-02 | End: 2018-02-20

## 2018-02-02 RX ADMIN — Medication 1 MG: at 00:04

## 2018-02-02 RX ADMIN — CLOPIDOGREL 75 MG: 75 TABLET, FILM COATED ORAL at 08:22

## 2018-02-02 RX ADMIN — PANTOPRAZOLE SODIUM 40 MG: 40 TABLET, DELAYED RELEASE ORAL at 08:22

## 2018-02-02 ASSESSMENT — ACTIVITIES OF DAILY LIVING (ADL): PREVIOUS_RESPONSIBILITIES: MEAL PREP;HOUSEKEEPING;LAUNDRY;SHOPPING;MEDICATION MANAGEMENT;FINANCES;DRIVING;WORK

## 2018-02-02 ASSESSMENT — PATIENT HEALTH QUESTIONNAIRE - PHQ9: SUM OF ALL RESPONSES TO PHQ QUESTIONS 1-9: 1

## 2018-02-02 ASSESSMENT — VISUAL ACUITY
OU: GLASSES
OU: GLASSES

## 2018-02-02 NOTE — PROGRESS NOTES
Social Work Services Progress Note    Hospital Day: 3  Date of Initial Social Work Evaluation:  2/1/18  Collaborated with:  OT and Physical Therapy    Data:  Pt received OT/Physical Therapy eval orders.    Intervention:  SW spoke with OT and Physical Therapy.   Physical Therapy will be deferred, per Toña RODRIGUEZ ([facility] physical therapist) as pt does not have physical therapy needs.   Pt was evaluated by OT this am and per OT, discharge to home is recommended.  Per OT/Physical Therapy, pt has no further OT/Physical Therapy needs.    Assessment:  Discharge to home is recommended.    Plan:    Anticipated Disposition:  Discharge to home.    Barriers to d/c plan:  None identified.    Follow Up:  No further SW intervention is planned at this time as return to home is anticipated.    ÁLVARO Randhawa  Social Work, 6A  Phone:  [phone]  Pager:  [phone]  2/2/2018

## 2018-02-02 NOTE — PLAN OF CARE
Problem: Patient Care Overview  Goal: Plan of Care/Patient Progress Review  OT 6A:  Discharge Planner OT   Patient plan for discharge: home today  Current status: Pt alert and oriented to person, place, time, situation. No significant cognitive or visual deficits identified.  Pt denying any numbness, strength is WFL for BLE and BUE. Pt reports able to manage self-feeding and g/h standing at sink w/o assistance.  Pt able to complete toilet transfer independently.  Pt ambulated 500ft+ w/ supervision, no LOB and able to navigate 8 stairs independently.  Pt denying any concerns with returning home.  Pt planning on spending the night in apartment in [facility] which has stairs to enter and stairs to get to bed/bath.  Pt planning on sponge bathing until she returns to house in [place] with walk-in shower.  Pt works full time, very eager to return to work, saying she plans to be back sometime next week.  No further OT needs at this time, and no anticipated needs at d/c.   able to assist with IADL as needed.  Follow MD recommendations for return to work and driving.   Barriers to return to prior living situation: none identified  Recommendations for discharge: home with assist for IADL if needed  Rationale for recommendations: Pt at or near baseline in mobility/cognition, safe to return home with ; no further skilled needs identified.        Entered by: Kirti Patricia 02/02/2018 9:52 AM

## 2018-02-02 NOTE — DISCHARGE SUMMARY
[facility], [place]    Neurology Stroke Discharge Summary    Date of Admission: 1/31/2018  Date of Discharge: 02/02/2018    Disposition: Discharged to home  Primary Care Physician: Lilli Ivan      Admission Diagnosis:   Acute ischemic stroke      Discharge Diagnosis:   Ischemic Stroke due to undetermined etiology    Problem Leading to Hospitalization (from [facility]):   Natali Myers is a 58 year old woman with PMH right frontoparietal infarct, HTN and HLD who presented with sudden onset left sided weakness, facial droop, and dysarthria. NIHSS 11. She had acute onset symptoms while giving a presentation and took two aspirins with concern for stroke. She presented within time window of tPA and received at 1708. Concern for R M2 occlusion on CTA and was taken to endovascular suite for thrombectomy.    Please see H&P dated 1/31/2018 for further details about presentation.    Brief Hospital Course:   Patient presented with acute onset left sided weakness, facial droop, and dysarthria with concern for right M2 occlusion on CTA who received tPA.  Evidence of right superior division M2 thrombus visualized in endovascular with intraprocedural vasospasm tx with IA verapamil with result TICI 3 and minimal residual deficit (right facial droop and subtle dysarthria). LDL 35 and A1c 5.6. We continued her atorvasatin 20 mg and switched her ASA 81 mg to Plavix 75 mg daily. Etiology for stroke was under determined and she was discharged with ziopatch to evaluate for afib. Given her rapid onset of symptoms with clot in M2 suspect embolic.     Found to have suspicion of right M2 occlusion.     IV tPA was administered.     Work-up as stated below under Pertinent Investigations.    Etiology is thought to be undetermined etiology.       Rehab evaluation: OT signed off, PT deferred due to lack of residual deficits.     Smoking Cessation: patient is not a smoker    BP Long-term Goal: 140/90 or  less    Antithrombotic/Anticoagulant Agent: clopidogrel (Plavix) 75 mg    Statins: Continued on atorvastatin 20 mg daily        Hgb A1C Goal: < 7.0    Complications: None.     Other problems addressed during this hospitalization:  Hypokalemia   On admission K 2.9 with history of hypokalemia prior to admission. She is on HCTZ-triamterene for hypertension with potassium supplement 10 mEq. Pressures were stable and given hypokalemia, she was instructed to discontinue her HCTZ-triamterene and take 20 mEq K until she can see her doctor in 7 days.     PERTINENT INVESTIGATIONS    Labs  Lipid Panel:   Recent Labs   Lab Test  18   0508   CHOL  100   HDL  47*   LDL  35   TRIG  91     A1C:   Lab Results   Component Value Date    A1C 5.6 2018     INR:   Recent Labs  Lab 18  2159 18  1643 18  1637   INR 1.13 1.1 0.93      Coag Panel / Hypercoag Workup: Not obtained.  Pending test results: Ziopatch    Echo:   Interpretation Summary  Global and regional left ventricular function is normal with an EF of 60-65%.  Grade 1 Diastolic dysfunction.  Right ventricular function, chamber size, wall motion, and thickness are  normal.  There is mild mitral stenosis with a mean gradient of 6 mmHg and a mitral  valve area of 1.7 cm^2 at a HR of 66 bpm. Mechanism appears rheumatic.  Other valves without significant dysfunction. Trace AI. Trace TR.  The inferior vena cava was normal in size with preserved respiratory  variability. Estimated mean right atrial pressure is 3 mmHg.  No pericardial effusion is present.    Imagin18 CT head and neck   Impression:    1. Head CTA demonstrates no aneurysm or stenosis of the major  intracranial arteries.   2. Neck CTA demonstrates no stenosis of the major cervical arteries.   3. No evidence of intracranial hemorrhage on the noncontrast head CT.     18 MR brain w/o contrast   Impression:  1. No evidence for acute infarction  2. Findings suggestive of minimal blood  products in the right sylvian  fissure.     Endovascular procedure: s/p thrombectomy, TICI 3     Cardiac Monitoring: Patient had > 24 hrs of cardiac monitor while in hospital.    Findings: No atrial fibrillation was found.    Sleep Apnea Screen:   Questions/Answers      1. Prior to your stroke, have you been told that you snore? No.    2. Prior to your stroke, have you been told that you struggle to breath while you are sleeping? No.    3. Prior to your stroke, do you feel tired and sleepy even after getting a normal night of sleep? No.    Sleep Apnea Screen Findings: Patient has 0-1 symptoms of sleep apnea.  Further sleep study is not recommended at this time.    PHQ-9 Depression Screen Score: 1    Education discussed with: patient and spouse on blood pressure management, cholesterol management, medical management, diet modification, follow-up recommendations/plan and results (of imaging).    PHYSICAL EXAMINATION  Vital Signs:  B/P: 132/73, T: 98.2, P: 101, R: 16    General:  Sitting in chair, comfortable, no acute distress     HEENT:  normocephalic/atraumatic  Cardio:  RRR  Pulmonary:  no respiratory distress  Abdomen:  soft  Extremities:  no edema  Skin:  intact, warm/dry     Neurologic  Mental Status:  fully alert, attentive and oriented, follows commands, mild dysarthria on examination.   Cranial Nerves:  visual fields intact, PERRL, EOMI with normal smooth pursuit, facial sensation intact and symmetric, hearing not formally tested but intact to conversation, palate elevation symmetric and uvula midline, no dysarthria, shoulder shrug strong bilaterally, tongue protrusion midline, mild right nasolabial fold flattening  Motor:  no abnormal movements, normal tone throughout, normal muscle bulk, no pronator drift, strength 5/5 throughout upper and lower extremities  Reflexes:  2+ and symmetric throughout  Sensory:  Intact to light touch throughout  Coordination:  FNF and HS intact without dysmetria  Station/Gait:   normal width, stride length, turn, and arm swing     National Institutes of Health Stroke Scale (on day of discharge)  NIHSS Total Score: 2    Modified Livan Scale (on day of discharge): 1-No significant disability despite symptoms; able to carry out all usual duties and activities    Medications    Discharge Medication List as of 2/2/2018  2:27 PM      CONTINUE these medications which have CHANGED    Details   potassium chloride SA (K-DUR/KLOR-CON M) 10 MEQ CR tablet Take 2 tablets (20 mEq) by mouth daily, Disp-60 tablet, R-1, Local Print      clopidogrel (PLAVIX) 75 MG tablet Take 1 tablet (75 mg) by mouth daily, Disp-30 tablet, R-11, E-Prescribe         CONTINUE these medications which have NOT CHANGED    Details   pantoprazole (PROTONIX) 40 MG EC tablet Take 1 tablet (40 mg) by mouth daily, Disp-90 tablet, R-3, E-Prescribe      atorvastatin (LIPITOR) 20 MG tablet Take 1 tablet (20 mg) by mouth daily, Disp-90 tablet, R-3, E-Prescribe      fluticasone (FLONASE ALLERGY RELIEF) 50 MCG/ACT spray Spray 2 sprays into both nostrils daily, Disp-1 Bottle, R-3, E-Prescribe      cetirizine (ZYRTEC) 10 MG CHEW Take 1 tablet (10 mg) by mouth as needed, Disp-30 tablet, R-3, E-Prescribe      naproxen sodium (ALEVE) 220 MG capsule Historical      albuterol (PROAIR HFA/PROVENTIL HFA/VENTOLIN HFA) 108 (90 BASE) MCG/ACT Inhaler Inhale 2 puffs into the lungs every 6 hours as needed for shortness of breath / dyspnea or wheezing, Disp-1 Inhaler, R-1, E-Prescribe      DIPHENHYDRAMINE HCL PO Take 25 mg by mouth nightly as needed , Historical      Blood Pressure Monitoring KIT 1 Device daily Pt to check BP readings daily at home, Disp-1 kit, R-0, Local Print         STOP taking these medications       triamterene-hydrochlorothiazide (DYAZIDE) 37.5-25 MG per capsule Comments:   Reason for Stopping:         aspirin 325 MG tablet Comments:   Reason for Stopping:               Additional recommendations and follow up:      Reason for your  hospital stay   You were hospitalized for stroke and received a clot buster tPA and a radiologic intervention for clot with good result.     Adult [facility] Follow-up and recommended labs and tests   Follow up with primary care provider, Lilli Ivan, within 7 days to evaluate medication change and revisit high blood pressure medications.  The following labs/tests are recommended: check your potassium.      Follow-up with Stroke Clinic in 1-2 months.   You will be contacted to schedule a Stroke Clinic appointment. If you have not been contacted to schedule a stroke follow-up appointment within 7 days of discharge, please contact Stroke Neurology Clinic at [phone], pick option #1.   If you have other stroke related questions, please call [phone], pick option #3, and ask to speak to Babar Chan RN Stroke/Endovascular Care Coordinator.  If you have any urgent stroke related questions after hours, please contact the hospital  at [phone] and ask to be connected to a stroke provider.   Appointments on [place] and/or [facility] (with [facility] or [facility] provider or service). Call [phone] if you haven't heard regarding these appointments within 7 days of discharge.     Activity   Your activity upon discharge: activity as tolerated     Discharge Instructions   You had a stroke of unknown cause, our goal is to prevent additional strokes and we recommend:   - Take plavix 75 mg daily   - Stop take aspirin 325 mg daily   - Continue taking atorvastatin 20 mg daily     In addition, you were noted to have low potassium and we recommend that you stop taking triamterene-HCTZ until you see your doctor in a week. Continue to take your potassium supplement until you can follow-up.     If you have acute onset one sided weakness, numbness, blindness or trouble speaking, call [phone] and come to the hospital immediately as this could be a stroke.     Full Code     Diet   Follow this diet upon discharge:  Regular         Patient was seen and discussed with the Attending, Dr. Blackwell.     Rachel Duff  Pager: [phone]

## 2018-02-02 NOTE — PLAN OF CARE
Problem: Patient Care Overview  Goal: Plan of Care/Patient Progress Review  Patient admitted for stroke. VSS. A&Ox4. Neuros intact. On continuous cardiac monitoring, NSR with occasional PACs. Had zio patch placed this shift for discharge. Regular diet. PIV SL. Up independently. Voiding spontaneously. No BM this shift. Patient will discharge home today once orders are placed. Will continue to monitor.

## 2018-02-02 NOTE — PROGRESS NOTES
02/02/18 0960   Quick Adds   Type of Visit Initial Occupational Therapy Evaluation   Living Environment   Lives With spouse   Living Arrangements apartment;house   Home Accessibility stairs within home;bed and bath are not on the first floor;stairs to enter home;tub/shower is not walk in   Number of Stairs to Enter Home 6   Number of Stairs Within Home 12   Stair Railings at Home outside, present at both sides;inside, present on right side   Transportation Available family or friend will provide   Living Environment Comment Pt lives with her  in a house in [place] with 2 steps to enter and everything on the main level.  Pt also has an apartment in [place] where she stays during the week while she works in [employer].  This apartment has 5-6 steps to enter and bed and bath are on the upper level with a full flight of stairs.  Pt and  are planning to stay there at least one night after d/c.      Self-Care   Dominant Hand right   Usual Activity Tolerance good   Current Activity Tolerance moderate   Regular Exercise no   Equipment Currently Used at Home none   Activity/Exercise/Self-Care Comment Pt denies exercising regularly but walks everything from her parking space to work ~1/2 mile.     Functional Level Prior   Ambulation 0-->independent   Transferring 0-->independent   Toileting 0-->independent   Bathing 0-->independent   Dressing 0-->independent   Eating 0-->independent   Communication 0-->understands/communicates without difficulty   Swallowing 0-->swallows foods/liquids without difficulty   Cognition 0 - no cognition issues reported   Fall history within last six months no   Which of the above functional risks had a recent onset or change? ambulation;cognition;communication/speech  (has resolved)   Prior Functional Level Comment Pt reports IND with ADL and IADL at baseline.  Pt had previous stroke ~1 year ago, and reports she had more pronounced cognitive changes, however these have  "resolved.    General Information   Onset of Illness/Injury or Date of Surgery - Date 01/31/18   Referring Physician Marisa Kessler, DO   Patient/Family Goals Statement \"to get back to work\"   Additional Occupational Profile Info/Pertinent History of Current Problem Per MD chart, \"Natali Myers is a 58 year old woman with a history of right frontoparietal infarct who was admitted on 1/31 for R M2 occlusion s/p tPA and thrombectomy TICI 3\"   Precautions/Limitations no known precautions/limitations   Weight-Bearing Status - LUE full weight-bearing   Weight-Bearing Status - RUE full weight-bearing   Weight-Bearing Status - LLE full weight-bearing   Weight-Bearing Status - RLE full weight-bearing   General Info Comments Activity orders: up ad alejo.     Cognitive Status Examination   Orientation orientation to person, place and time   Level of Consciousness alert   Able to Follow Commands WNL/WFL   Memory intact   Attention No deficits were identified   Organization/Problem Solving No deficits were identified   Executive Function No deficits were identified   Cognitive Comment Pt is alert and orientedx4.  She feels like she is at her baseline for cognition.     Visual Perception   Visual Perception Wears glasses   Visual Perception Comments Pt denies changes in vision, able to avoid obstacles when ambulating.   Sensory Examination   Sensory Comments Pt reports L-sided numbess which has since resolved   Pain Assessment   Patient Currently in Pain No   Integumentary/Edema   Integumentary/Edema Comments No concerns   Posture   Posture forward head position   Range of Motion (ROM)   ROM Comment WFL for BUE   Strength   Strength Comments Screened WFL for BUE and BLE   Hand Strength   Hand Strength Comments WFL for B UE   Coordination   Coordination Comments Finger oppsosition intact, finger-nose intact   Mobility   Bed Mobility Comments Supervision   Transfer Skills   Transfer Comments IND sit<>stand " "  Toilet Transfer   Toilet Transfer Comments IND   Balance   Balance Comments Pt ambulating ~500ft+ w/ supervision and no LOB.   Lower Body Dressing   Level of [place]: Dress Lower Body independent   Toileting   Level of [place]: Toilet independent   Grooming   Level of [place]: Grooming independent   Eating/Self Feeding   Level of [place]: Eating independent   Instrumental Activities of Daily Living (IADL)   Previous Responsibilities meal prep;housekeeping;laundry;shopping;medication management;finances;driving;work   IADL Comments Pt's  able to stay home with her and assist if needed. Pt was IND with IADL, working 50 hrs/week.  Pt is eager to get back to work, saying \"I want to get back this week\".   Activities of Daily Living Analysis   Impairments Contributing to Impaired Activities of Daily Living (none)   Clinical Impression   Criteria for Skilled Therapeutic Interventions Met evaluation only   OT Diagnosis Post-CVA   Assessment of Occupational Performance 1-3 Performance Deficits   Identified Performance Deficits IADL   Clinical Decision Making (Complexity) Low complexity   Anticipated Discharge Disposition Home   Risks and Benefits of Treatment have been explained. Yes   Patient, Family & other staff in agreement with plan of care Yes   Clinical Impression Comments Pt presents with deficits in ADL and functional mobility post-CVA however these have resolved.  Pt is able to ambulate and navigate stairs, transfer from various surfaces, and able to complete basic ADL. Pt's  is able to assist her with IADL/ADL if needed.  Pt safe to return home with assist from  prn.  Pt with no OP rehab needs at present, however educated pt to continue to monitor and pursue rehab if needed.  Follow MD recommendations for return to work/driving.   [facility] AM-PAC TM \"6 Clicks\"   2016, Trustees of [facility], under license to "[employer].  All rights reserved.   6 Clicks " "Short Forms Daily Activity Inpatient Short Form   [facility] AM-PAC  \"6 Clicks\" Daily Activity Inpatient Short Form   1. Putting on and taking off regular lower body clothing? 4 - None   2. Bathing (including washing, rinsing, drying)? 4 - None   3. Toileting, which includes using toilet, bedpan or urinal? 4 - None   4. Putting on and taking off regular upper body clothing? 4 - None   5. Taking care of personal grooming such as brushing teeth? 4 - None   6. Eating meals? 4 - None   Daily Activity Raw Score (Score out of 24.Lower scores equate to lower levels of function) 24   Total Evaluation Time   Total Evaluation Time (Minutes) 30  (eval)     "

## 2018-02-02 NOTE — PLAN OF CARE
Problem: Stroke (Ischemic) (Adult)  Goal: Signs and Symptoms of Listed Potential Problems Will be Absent, Minimized or Managed (Stroke)  Signs and symptoms of listed potential problems will be absent, minimized or managed by discharge/transition of care (reference Stroke (Ischemic) (Adult) CPG).   Outcome: No Change  VSS. A&Ox4. Neuros intact. Denies pain. R groin incision intact, no signs of hematoma. Voiding spont. BS+. PIV SL. Up with SBA. Reg diet. On continues cardiac monitor. Cont to monitor and with POC.

## 2018-02-02 NOTE — PLAN OF CARE
Problem: Patient Care Overview  Goal: Plan of Care/Patient Progress Review  Outcome: Improving  Patient admitted with R MCA Stroke - thrombectomy and TPA given 1/31 at 1705.   Neuro - Intact. Left droop resolved.   Cardiac - WNL  Pulm - WNL. RA  GI - Regular diet. Bm this shift.   - Adequate.

## 2018-02-02 NOTE — PLAN OF CARE
Problem: Patient Care Overview  Goal: Plan of Care/Patient Progress Review  6A-PT: Defer: PT consult received and appreciated. Per chart review and discussion with OT, pt supervised - mod indep for all mobility. Plans to dc home with support of SO. OT to address IP therapy needs at this time. Defer PT. Please re-consult if pt has change in status.

## 2018-02-02 NOTE — PLAN OF CARE
Patient discharged home today at 1445. Patient is stable and had stroke PLC today at bedside. Bedside RN discussed discharge instructions with the patient. Patient verbalized understanding of follow up appointments and new medications. Patient received zio patch holter monitor to go home and verbalized understanding of at home instructions. Will continue to monitor.

## 2018-02-04 ENCOUNTER — MEDICAL CORRESPONDENCE (OUTPATIENT)
Facility: CLINIC | Age: 58
End: 2018-02-04
Payer: COMMERCIAL

## 2018-02-04 ENCOUNTER — CARE COORDINATION (OUTPATIENT)
Dept: CARE COORDINATION | Facility: CLINIC | Age: 58
End: 2018-02-04

## 2018-02-04 PROCEDURE — 0298T ZZC EXT ECG > 48HR TO 21 DAY REVIEW AND INTERPRETATN: CPT | Performed by: INTERNAL MEDICINE

## 2018-02-05 ENCOUNTER — TELEPHONE (OUTPATIENT)
Dept: INTERNAL MEDICINE | Facility: CLINIC | Age: 58
End: 2018-02-05

## 2018-02-05 NOTE — TELEPHONE ENCOUNTER
----- Message from Angela Diaz sent at 2/5/2018 10:21 AM CST -----  Regarding: ER FU Appt   Contact: [phone]  Pt Ph# [phone]    Pt of Dr Ivan    Next available to schedule Ivan is 2/19/18. Pt needs FU appt sooner due to hospital visit. Admission Diagnosis: Acute ischemic stroke. Please call Pt to help schedule sooner. Pt has low levels of potassium and BP Rx concern (no longer on it)- Per Pt.  Date of Admission: 1/31/2018  Date of Discharge: 02/02/2018    Disposition: Discharged to home    Appt with Dr Burnette on 02/13/2018 at 4:30pm.  Pt VERY insistent on getting labs done besides her appt.  Venus Crenshaw RN 1:55 PM on 2/5/2018.

## 2018-02-06 NOTE — PROGRESS NOTES
Patient was called three times and no answer so post 24 hr DC follow up calls will be closed out, message was left with contact number for department seen by or following up with PCP    Follow up with primary care provider, Lilli Ivan, within 7 days to evaluate medication change and revisit high blood pressure medications.  The following labs/tests are recommended: check your potassium.

## 2018-02-13 ENCOUNTER — OFFICE VISIT (OUTPATIENT)
Dept: INTERNAL MEDICINE | Facility: CLINIC | Age: 58
End: 2018-02-13
Payer: COMMERCIAL

## 2018-02-13 VITALS
BODY MASS INDEX: 46.08 KG/M2 | RESPIRATION RATE: 16 BRPM | SYSTOLIC BLOOD PRESSURE: 161 MMHG | WEIGHT: 247.9 LBS | DIASTOLIC BLOOD PRESSURE: 90 MMHG | HEART RATE: 85 BPM

## 2018-02-13 DIAGNOSIS — I63.9 CEREBROVASCULAR ACCIDENT (CVA), UNSPECIFIED MECHANISM (H): ICD-10-CM

## 2018-02-13 DIAGNOSIS — E87.6 HYPOKALEMIA: ICD-10-CM

## 2018-02-13 DIAGNOSIS — I10 BENIGN ESSENTIAL HYPERTENSION: Primary | ICD-10-CM

## 2018-02-13 DIAGNOSIS — I10 BENIGN ESSENTIAL HYPERTENSION: ICD-10-CM

## 2018-02-13 LAB
ANION GAP SERPL CALCULATED.3IONS-SCNC: 7 MMOL/L (ref 3–14)
BUN SERPL-MCNC: 12 MG/DL (ref 7–30)
CALCIUM SERPL-MCNC: 9.3 MG/DL (ref 8.5–10.1)
CHLORIDE SERPL-SCNC: 104 MMOL/L (ref 94–109)
CO2 SERPL-SCNC: 27 MMOL/L (ref 20–32)
CREAT SERPL-MCNC: 0.98 MG/DL (ref 0.52–1.04)
GFR SERPL CREATININE-BSD FRML MDRD: 59 ML/MIN/1.7M2
GLUCOSE SERPL-MCNC: 91 MG/DL (ref 70–99)
POTASSIUM SERPL-SCNC: 3.9 MMOL/L (ref 3.4–5.3)
SODIUM SERPL-SCNC: 138 MMOL/L (ref 133–144)

## 2018-02-13 RX ORDER — LISINOPRIL 20 MG/1
20 TABLET ORAL DAILY
Qty: 30 TABLET | Refills: 1 | Status: SHIPPED | OUTPATIENT
Start: 2018-02-13 | End: 2018-02-26

## 2018-02-13 ASSESSMENT — PAIN SCALES - GENERAL: PAINLEVEL: NO PAIN (0)

## 2018-02-13 NOTE — PATIENT INSTRUCTIONS
Primary Care Center Phone Number [phone]  Primary Care Center Medication Refill Request Information:  * Please contact your pharmacy regarding ANY request for medication refills.  ** [facility] Prescription Fax = [phone]  * Please allow 3 business days for routine medication refills.  * Please allow 5 business days for controlled substance medication refills.     Primary [facility] Test Result notification information:  *You will be notified with in 7-10 days of your appointment day regarding the results of your test.  If you are on MyChart you will be notified as soon as the provider has reviewed the results and signed off on them.        Go to lab today.   I'll contact you via [employer] with the result in terms of what to take for potassium supplement.   Start lisinopril 20 mg.   Repeat labs (no doctor appt) in 1 wk.   Follow-up with me or Dr. Ivan in about 2 weeks

## 2018-02-13 NOTE — MR AVS SNAPSHOT
After Visit Summary   2/13/2018    Natali Myers    MRN: [id number]           Patient Information     Date Of Birth          1960        Visit Information        Provider Department      2/13/2018 4:30 PM Gloria Marie MD [facility]        Today's Diagnoses     Benign essential hypertension    -  1    Hypokalemia          Care Instructions    Primary Care Center Phone Number [phone]  Primary Care Center Medication Refill Request Information:  * Please contact your pharmacy regarding ANY request for medication refills.  ** [facility] Prescription Fax = [phone]  * Please allow 3 business days for routine medication refills.  * Please allow 5 business days for controlled substance medication refills.     Primary Care Center Test Result notification information:  *You will be notified with in 7-10 days of your appointment day regarding the results of your test.  If you are on MyChart you will be notified as soon as the provider has reviewed the results and signed off on them.        Go to lab today.   I'll contact you via Moogi with the result in terms of what to take for potassium supplement.   Start lisinopril 20 mg.   Repeat labs (no doctor appt) in 1 wk.   Follow-up with me or Dr. Ivan in about 2 weeks            Follow-ups after your visit        Your next 10 appointments already scheduled     Feb 13, 2018  5:45 PM CST   LAB with  LAB    [facility])    [address] [place] [id number] [phone]           Please do not eat 10-12 hours before your appointment if you are coming in fasting for labs on lipids, cholesterol, or glucose (sugar). This does not apply to pregnant women. Water, hot tea and black coffee (with nothing added) are okay. Do not drink other fluids, diet soda or chew gum.            Feb 20, 2018  5:45 PM CST   LAB with  LAB    [facility] ([facility])    [address] [facility]  [address] [facility] [id number] [phone]           Please do not eat 10-12 hours before your appointment if you are coming in fasting for labs on lipids, cholesterol, or glucose (sugar). This does not apply to pregnant women. Water, hot tea and black coffee (with nothing added) are okay. Do not drink other fluids, diet soda or chew gum.            Feb 26, 2018  4:10 PM CST   (Arrive by 3:55 PM)   Return Visit with Lilli Ivan MD   [facility] ([facility])    [address] [place] [address]  [address] [facility] [id number] [phone]              Future tests that were ordered for you today     Open Future Orders        Priority Expected Expires Ordered    Basic metabolic panel Routine 2/13/2018 2/27/2018 2/13/2018    Basic metabolic panel Routine 2/20/2018 2/27/2018 2/13/2018            Who to contact     Please call your clinic at [phone] to:    Ask questions about your health    Make or cancel appointments    Discuss your medicines    Learn about your test results    Speak to your doctor            Additional Information About Your Visit        [employer] Information     [employer] gives you secure access to your electronic health record. If you see a primary care provider, you can also send messages to your care team and make appointments. If you have questions, please call your primary care clinic.  If you do not have a primary care provider, please call [phone] and they will assist you.      [employer] is an electronic gateway that provides easy, online access to your medical records. With [employer], you can request a clinic appointment, read your test results, renew a prescription or communicate with your care team.     To access your existing account, please contact your [facility] or call [phone] for assistance.        Care EveryWhere ID     This is your Care EveryWhere ID.  This could be used by other organizations to access your [facility] medical records  [id number]        Your Vitals Were     Pulse Respirations Last Period Breastfeeding? BMI (Body Mass Index)       85 16 (LMP Unknown) No 46.08 kg/m2        Blood Pressure from Last 3 Encounters:   02/13/18 161/90   02/02/18 147/78   12/05/17 143/84    Weight from Last 3 Encounters:   02/13/18 112.4 kg (247 lb 14.4 oz)   01/31/18 104.6 kg (230 lb 9.6 oz)   12/05/17 114.7 kg (252 lb 14.4 oz)                 Today's Medication Changes          These changes are accurate as of 2/13/18  5:10 PM.  If you have any questions, ask your nurse or doctor.               Start taking these medicines.        Dose/Directions    lisinopril 20 MG tablet   Commonly known as:  PRINIVIL/ZESTRIL   Used for:  Benign essential hypertension   Started by:  Gloria Marie MD        Dose:  20 mg   Take 1 tablet (20 mg) by mouth daily   Quantity:  30 tablet   Refills:  1            Where to get your medicines      These medications were sent to [facility]/pharmacy #[id number] - [place], [place] - [address]  [address], [place] [address]     Phone:  [phone]     lisinopril 20 MG tablet                Primary Care Provider Office Phone # Fax #    Lilli Ivan [phone]       [address] [address]        Equal Access to Services     [facility] Center[facility] : Roberto gutierrezo Somisti, waaxda luqadaha, qaybta kaalmamarilee duffy wilfredo montana adesocorro sargent. So [facility] [phone].    ATENCIÓN: Si habla syed, tiene a ojeda disposición servicios gratuitos de asistencia lingüística. Llame al [phone].    We comply with applicable federal civil rights laws and [place] laws. We do not discriminate on the basis of race, color, national origin, age, disability, sex, sexual orientation, or gender identity.            Thank you!     Thank you for choosing [facility]  for your care.  Our goal is always to provide you with excellent care. Hearing back from our patients is one way we can continue to improve our services. Please take a few minutes to complete the written survey that you may receive in the mail after your visit with us. Thank you!             Your Updated Medication List - Protect others around you: Learn how to safely use, store and throw away your medicines at www.disposemymeds.org.          This list is accurate as of 2/13/18  5:10 PM.  Always use your most recent med list.                   Brand Name Dispense Instructions for use Diagnosis    albuterol 108 (90 BASE) MCG/ACT Inhaler    PROAIR HFA/PROVENTIL HFA/VENTOLIN HFA    1 Inhaler    Inhale 2 puffs into the lungs every 6 hours as needed for shortness of breath / dyspnea or wheezing    Mild intermittent asthma without complication       ALEVE 220 MG capsule   Generic drug:  naproxen sodium           atorvastatin 20 MG tablet    LIPITOR    90 tablet    Take 1 tablet (20 mg) by mouth daily    Cerebrovascular accident (CVA), unspecified mechanism (H)       Blood Pressure Monitoring Kit     1 kit    1 Device daily Pt to check BP readings daily at home    BMI 40.0-44.9, adult (H)       cetirizine 10 MG Chew    zyrTEC    30 tablet    Take 1 tablet (10 mg) by mouth as needed    Chronic allergic rhinitis, unspecified seasonality, unspecified trigger       clopidogrel 75 MG tablet    PLAVIX    30 tablet    Take 1 tablet (75 mg) by mouth daily    Acute CVA (cerebrovascular accident) (H)       DIPHENHYDRAMINE HCL PO      Take 25 mg by mouth nightly as needed        fluticasone 50 MCG/ACT spray    FLONASE ALLERGY RELIEF    1 Bottle    Spray 2 sprays into both nostrils daily    Chronic allergic rhinitis, unspecified seasonality, unspecified trigger       lisinopril 20 MG tablet    PRINIVIL/ZESTRIL    30 tablet    Take 1 tablet (20 mg) by mouth daily    Benign essential hypertension       pantoprazole 40 MG EC tablet    PROTONIX    90  tablet    Take 1 tablet (40 mg) by mouth daily    Gastroesophageal reflux disease without esophagitis       potassium chloride SA 10 MEQ CR tablet    K-DUR/KLOR-CON M    60 tablet    Take 2 tablets (20 mEq) by mouth daily    Hypokalemia

## 2018-02-13 NOTE — NURSING NOTE
Chief Complaint   Patient presents with     Hospital F/U     pt is here for a hospital follow up       Karma Pride CMA at 4:31 PM on 2/13/2018

## 2018-02-13 NOTE — PROGRESS NOTES
Rooming Note  Health Maintenance   Health Maintenance Due   Topic Date Due     ASTHMA ACTION PLAN Q1 YR  [birth year]07/1965     ASTHMA CONTROL TEST Q6 MOS  01/07/1965    All health maintenance items UP TO DATE

## 2018-02-14 ENCOUNTER — MYC MEDICAL ADVICE (OUTPATIENT)
Dept: INTERNAL MEDICINE | Facility: CLINIC | Age: 58
End: 2018-02-14

## 2018-02-14 ENCOUNTER — CARE COORDINATION (OUTPATIENT)
Dept: NEUROLOGY | Facility: CLINIC | Age: 58
End: 2018-02-14

## 2018-02-14 NOTE — PROGRESS NOTES
Stroke Center Discharge Coordination Note     Responsible Attending physician: Dr. Blackwell      Operation performed: Diagnostic cerebral angiogram 1/31/18     Date of Discharge: 2/2/18     Discharge Diagnosis: Ischemic Stroke due to undetermined etiology    Discharge to: Home    Current Status:                  New s/s stroke; Trouble w/speech,thinking,processing:  No                               Incision site:  Denies bleeding, drainage, pain, swelling, redness at groin puncture site.                                Diabetic:  No                               Pain Management:  No    Receiving Therapy;Where:  No    Falls:  No    Driving;Working:  Yes; yes                 ADL's:  Independent                               General: Saw primary care provider yesterday. Potassium level drawn. Lisinopril initiated. She went back to work part time last week. She is on day 3 of 8 hour days and states it is going ok. She was working 10-11 hour days prior to stroke. Reports a lot of stress at work with recent lay offs. Rechecking potassium again next Tuesday 2/20. Has cardiac monitor placed.     What new medications did you start in the hospital and how are you taking those? (compare this to AVS to confirm patient taking correctly). Reviewed medication list. Taking medications as prescribed.   Current Outpatient Prescriptions   Medication Sig Dispense Refill     lisinopril (PRINIVIL/ZESTRIL) 20 MG tablet Take 1 tablet (20 mg) by mouth daily 30 tablet 1     clopidogrel (PLAVIX) 75 MG tablet Take 1 tablet (75 mg) by mouth daily 30 tablet 11     pantoprazole (PROTONIX) 40 MG EC tablet Take 1 tablet (40 mg) by mouth daily 90 tablet 3     atorvastatin (LIPITOR) 20 MG tablet Take 1 tablet (20 mg) by mouth daily 90 tablet 3     fluticasone (FLONASE ALLERGY RELIEF) 50 MCG/ACT spray Spray 2 sprays into both nostrils daily 1 Bottle 3     cetirizine (ZYRTEC) 10 MG CHEW Take 1 tablet (10 mg) by mouth as needed 30 tablet 3     naproxen  sodium (ALEVE) 220 MG capsule        albuterol (PROAIR HFA/PROVENTIL HFA/VENTOLIN HFA) 108 (90 BASE) MCG/ACT Inhaler Inhale 2 puffs into the lungs every 6 hours as needed for shortness of breath / dyspnea or wheezing 1 Inhaler 1     DIPHENHYDRAMINE HCL PO Take 25 mg by mouth nightly as needed        potassium chloride SA (K-DUR/KLOR-CON M) 10 MEQ CR tablet Take 2 tablets (20 mEq) by mouth daily 60 tablet 1     Blood Pressure Monitoring KIT 1 Device daily Pt to check BP readings daily at home 1 kit 0       Questions: Not at this time. Patient has my contact information and was encouraged to call with questions/concerns.     Follow up plan per discharge summary:   - Take plavix 75 mg daily   - Stop take aspirin 325 mg daily   - Continue taking atorvastatin 20 mg daily   In addition, you were noted to have low potassium and we recommend that you stop taking triamterene-HCTZ until you see your doctor in a week. Continue to take your potassium supplement until you can follow-up.   Follow-up with stroke provider in 2-3 months. Message sent to scheduling to contact patient to make appointment.

## 2018-02-15 NOTE — PROGRESS NOTES
Hospitalization Follow-up Visit         HPI         Summary of hospitalization:  [facility] discharge summary reviewed   Patient was hospitalized from 1/31-2/2 after presenting with acute left sided weakness, facial droop, and dysarthria. She was given TPA. She had near resolution of her symptoms. She has had a stroke in the past, and was on aspirin therapy when she had this current stroke. She was switched from aspirin to Plavix. She is currently on 20 mg of atorvastatin, and this has not been increased because her LDL is 53. She is currently wearing a Zio patch monitor to assess for whether or not she has any paroxysmal atrial fibrillation. However when she had telemetry monitoring in the hospital, she did not have any evidence of this.    In the meantime, she has been treated for her hypertension with Dyazide. When she presented in the hospital, she had a potassium of 2.9. Her Dyazide has been stopped since discharge. She is not currently taking anything for her blood pressure. She has been monitoring her blood pressure, and her recordings go back to before she was in the hospital with a stroke as of December 2017 the majority of her readings are in the 130s to 140s over 80s to 90s. However even when she was on Dyazide, she did not have much improvement in her blood pressure. She continues to take potassium supplements and is wondering what she should do in regards to this and her blood pressure.     Diagnostic Tests/Treatments reviewed.  Follow up needed: Repeat potassium     Post Discharge Medication Reconciliation: discharge medications reconciled and changed, per note/orders (see AVS).  Medications reviewed by: by myself  Problems taking medications regularly:  None  Problems adhering to non-medication therapy:  None    Other Healthcare Providers Involved in Patient s Care:         Specialist appointment - Follow-up with neurology in 2-3 months.  Update since discharge: improved.   Plan of care  communicated with patient                        Review of Systems:   CONSTITUTIONAL: no fatigue, no unexpected change in weight  SKIN: no worrisome rashes, no worrisome moles, no worrisome lesions  EYES: no acute vision problems or changes  ENT: no ear problems, no mouth problems, no throat problems  RESP: no significant cough, no shortness of breath  CV: no chest pain, no palpitations, no new or worsening peripheral edema  GI: no nausea, no vomiting, no constipation, no diarrhea  NEURO: no weakness, no dizziness, no syncope, no headaches            Physical Exam:     /90  Pulse 85  Resp 16  Wt 112.4 kg (247 lb 14.4 oz)  LMP  (LMP Unknown)  Breastfeeding? No  BMI 46.08 kg/m2      Gen.: Pleasant female, in no apparent distress  ENT: Oropharynx clear, moist mucous membrane and  Respiratory: Lungs clear to auscultation bilaterally  Cardiac vascular: Heart regular rate and rhythm, no murmurs or gallops  Neuro: Alert and oriented ×3, no facial droop, extraocular muscles intact, cranial nerves II through XII intact, moving all extremities equally.         Results:   Pending    Assessment and Plan     Natail is a 58-year-old female with past medical history of CVA, with a recent one on 1/31/18. She has switched to Plavix from aspirin. She is not currently on anything for her blood pressure, and her blood pressure continues to be high specially with her readings at home. We will go ahead and check a basic metabolic panel today, to assess what her potassium and creatinine are primarily. It is able decide whether or not she should continue or stop her potassium supplements. Also, instead of starting her on Dyazide again, I will go ahead and start her on lisinopril. I do not feel that the Dyazide was adding much to her blood pressure based off of the readings that she brought from home. I will then go ahead and check a basic metabolic panel 1 week after starting lisinopril and have her follow-up in clinic in 2  weeks after starting lisinopril. Regarding risk stratification for her stroke, blood pressure management is going to be the most important thing, especially getting her blood pressure to a target of 120/80, given new guidelines and recommendations. Otherwise she is already on statin therapy with optimal LDL, on Plavix, doesn't smoke, and is not a diabetic.    Benign essential hypertension  - Basic metabolic panel  - Basic metabolic panel  - lisinopril (PRINIVIL/ZESTRIL) 20 MG tablet  Dispense: 30 tablet; Refill: 1  - Moderate complexity within 14 days [30988]    Hypokalemia   Basic metabolic panel  - Basic metabolic panel  - Moderate complexity within 14 days [36599]    Cerebrovascular accident (CVA), unspecified mechanism (H)  - Moderate complexity within 14 days [88544]      E&M code to be billed if TCM cannot be: 45249    Type of decision making: Moderate complexity (26187)    Options for treatment and follow-up care were reviewed with the patient.  Natali Myers engaged in the decision making process and verbalized understanding of the options discussed and agreed with the final plan.      Gloria Shaikh

## 2018-02-20 DIAGNOSIS — E87.6 HYPOKALEMIA: ICD-10-CM

## 2018-02-20 DIAGNOSIS — I10 BENIGN ESSENTIAL HYPERTENSION: ICD-10-CM

## 2018-02-20 LAB
ANION GAP SERPL CALCULATED.3IONS-SCNC: 6 MMOL/L (ref 3–14)
BUN SERPL-MCNC: 9 MG/DL (ref 7–30)
CALCIUM SERPL-MCNC: 9 MG/DL (ref 8.5–10.1)
CHLORIDE SERPL-SCNC: 105 MMOL/L (ref 94–109)
CO2 SERPL-SCNC: 28 MMOL/L (ref 20–32)
CREAT SERPL-MCNC: 0.88 MG/DL (ref 0.52–1.04)
GFR SERPL CREATININE-BSD FRML MDRD: 66 ML/MIN/1.7M2
GLUCOSE SERPL-MCNC: 93 MG/DL (ref 70–99)
POTASSIUM SERPL-SCNC: 3.3 MMOL/L (ref 3.4–5.3)
SODIUM SERPL-SCNC: 140 MMOL/L (ref 133–144)

## 2018-02-21 NOTE — TELEPHONE ENCOUNTER
potassium chloride SA (K-DUR/KLOR-CON M) 10 MEQ CR tablet   Last Written Prescription Date:2/2/18  Last Fill Quantity: 60,   # refills: 1  Last Office Visit : 2/13/18  Future Office visit:  2/26/18    Routing because. med started in pt/ hosp admit by other provider rf?

## 2018-02-26 ENCOUNTER — OFFICE VISIT (OUTPATIENT)
Dept: INTERNAL MEDICINE | Facility: CLINIC | Age: 58
End: 2018-02-26
Payer: COMMERCIAL

## 2018-02-26 VITALS
SYSTOLIC BLOOD PRESSURE: 137 MMHG | RESPIRATION RATE: 16 BRPM | BODY MASS INDEX: 46.66 KG/M2 | HEART RATE: 86 BPM | WEIGHT: 251 LBS | DIASTOLIC BLOOD PRESSURE: 85 MMHG | OXYGEN SATURATION: 95 %

## 2018-02-26 DIAGNOSIS — E87.6 HYPOKALEMIA: ICD-10-CM

## 2018-02-26 DIAGNOSIS — I10 BENIGN ESSENTIAL HYPERTENSION: Primary | ICD-10-CM

## 2018-02-26 RX ORDER — POTASSIUM CHLORIDE 750 MG/1
20 TABLET, EXTENDED RELEASE ORAL DAILY
Qty: 180 TABLET | Refills: 1 | Status: SHIPPED | OUTPATIENT
Start: 2018-02-26 | End: 2018-02-26

## 2018-02-26 RX ORDER — LISINOPRIL 20 MG/1
20 TABLET ORAL 2 TIMES DAILY
Qty: 180 TABLET | Refills: 1 | Status: SHIPPED | OUTPATIENT
Start: 2018-02-26 | End: 2018-08-10

## 2018-02-26 RX ORDER — POTASSIUM CHLORIDE 750 MG/1
TABLET, EXTENDED RELEASE ORAL
Qty: 60 TABLET | Refills: 1 | OUTPATIENT
Start: 2018-02-26

## 2018-02-26 RX ORDER — POTASSIUM CHLORIDE 750 MG/1
20 TABLET, EXTENDED RELEASE ORAL DAILY
Qty: 180 TABLET | Refills: 1 | Status: SHIPPED | OUTPATIENT
Start: 2018-02-26 | End: 2018-08-10

## 2018-02-26 ASSESSMENT — PAIN SCALES - GENERAL: PAINLEVEL: NO PAIN (0)

## 2018-02-26 NOTE — MR AVS SNAPSHOT
After Visit Summary   2/26/2018    Natali Myers    MRN: [id number]           Patient Information     Date Of Birth          1960        Visit Information        Provider Department      2/26/2018 4:10 PM Lilli Ivan MD [facility]        Today's Diagnoses     Benign essential hypertension    -  1    Hypokalemia          Care Instructions    Primary Care Center: [phone]     Primary Care Center Medication Refill Request Information:  * Please contact your pharmacy regarding ANY request for medication refills.  ** [facility] Prescription Fax = [phone]  * Please allow 3 business days for routine medication refills.  * Please allow 5 business days for controlled substance medication refills.     Primary Care Center Test Result notification information:  *You will be notified with in 7-10 days of your appointment day regarding the results of your test.  If you are on MyChart you will be notified as soon as the provider has reviewed the results and signed off on them.      Lab: [phone] (1st Floor [facility])    Nephrology: [phone] (3rd Floor [facility])         1.  Please check the potassium level in 3 days.  Then weekly  2.  IN three days, check the serum cortisol level, in the AM     Referral to Nephrology for HTN and low K    Natali was seen today for cerebrovascular accident.    Diagnoses and all orders for this visit:    Benign essential hypertension  -     Potassium; Standing  -     lisinopril (PRINIVIL/ZESTRIL) 20 MG tablet; Take 1 tablet (20 mg) by mouth 2 times daily      Send me your BP reading via MYCHART please            Follow-ups after your visit        Additional Services     NEPHROLOGY ADULT REFERRAL       Your provider has referred you to: [facility] [phone]   [email or web address]    Please be aware that coverage of these services is subject to the terms  and limitations of your health insurance plan.  Call member services at your health plan with any benefit or coverage questions.      Reason for referral:  HTN and persistent HYPOkalemia.  Assess for Conn's syndrome and Cushings    Please bring the following to your appointment:    >>   Any x-rays, CTs or MRIs which have been performed.  Contact the facility where they were done to arrange for  prior to your scheduled appointment.   >>   List of current medications   >>   This referral request   >>   Any documents/labs given to you for this referral                  Your next 10 appointments already scheduled     Mar 01, 2018  7:45 AM CST   LAB with  LAB   [facility] ([facility])    [address] [place] [address]  [address] [facility] [address] [phone]           Please do not eat 10-12 hours before your appointment if you are coming in fasting for labs on lipids, cholesterol, or glucose (sugar). This does not apply to pregnant women. Water, hot tea and black coffee (with nothing added) are okay. Do not drink other fluids, diet soda or chew gum.            Apr 03, 2018  2:20 PM CDT   (Arrive by 2:05 PM)   New Stroke with José Miguel Snyder MD   [facility] Neurology ([facility])    [address] [place] [address]  [address] [facility] [address] [phone]              Future tests that were ordered for you today     Open Standing Orders        Priority Remaining Interval Expires Ordered    Potassium Routine 10/10 1 week 2/26/2019 2/26/2018          Open Future Orders        Priority Expected Expires Ordered    Cortisol Routine  2/27/2019 2/26/2018            Who to contact     Please call your clinic at [phone] to:    Ask questions about your health    Make or cancel appointments    Discuss your medicines    Learn about your test results    Speak to your doctor            Additional Information About Your Visit        MyChart Information      [employer] gives you secure access to your electronic health record. If you see a primary care provider, you can also send messages to your care team and make appointments. If you have questions, please call your primary care clinic.  If you do not have a primary care provider, please call [phone] and they will assist you.      [employer] is an electronic gateway that provides easy, online access to your medical records. With [employer], you can request a clinic appointment, read your test results, renew a prescription or communicate with your care team.     To access your existing account, please contact your [facility] or call [phone] for assistance.        Care EveryWhere ID     This is your Care EveryWhere ID. This could be used by other organizations to access your [place] medical records  [id number]        Your Vitals Were     Pulse Respirations Last Period Pulse Oximetry Breastfeeding? BMI (Body Mass Index)    86 16 (LMP Unknown) 95% No 46.66 kg/m2       Blood Pressure from Last 3 Encounters:   02/26/18 137/85   02/13/18 161/90   02/02/18 147/78    Weight from Last 3 Encounters:   02/26/18 113.9 kg (251 lb)   02/13/18 112.4 kg (247 lb 14.4 oz)   01/31/18 104.6 kg (230 lb 9.6 oz)              We Performed the Following     NEPHROLOGY ADULT REFERRAL          Today's Medication Changes          These changes are accurate as of 2/26/18  4:42 PM.  If you have any questions, ask your nurse or doctor.               These medicines have changed or have updated prescriptions.        Dose/Directions    lisinopril 20 MG tablet   Commonly known as:  PRINIVIL/ZESTRIL   This may have changed:  when to take this   Used for:  Benign essential hypertension   Changed by:  Lilli Ivan MD        Dose:  20 mg   Take 1 tablet (20 mg) by mouth 2 times daily   Quantity:  180 tablet   Refills:  1            Where to get your medicines      These medications were sent to [facility]/pharmacy #[id number] -  [place], [place] - [address] [facility]  [address], [place] [address]     Phone:  [phone]     lisinopril 20 MG tablet                Primary Care Provider Office Phone # Fax #    Lilli Ivan [phone] [address] [address]        Equal Access to Services     [facility]: Hadii aad ku hadasho Soomaali, waaxda luqadaha, qaybta kaalmada adeegyada, waxay idiin hayaan adeeg kharash la'aan ah. So [facility] [phone].    ATENCIÓN: Si habla español, tiene a ojeda disposición servicios gratuitos de asistencia lingüística. Bijan al [phone].    We comply with applicable federal civil rights laws and [place] laws. We do not discriminate on the basis of race, color, national origin, age, disability, sex, sexual orientation, or gender identity.            Thank you!     Thank you for choosing [facility]  for your care. Our goal is always to provide you with excellent care. Hearing back from our patients is one way we can continue to improve our services. Please take a few minutes to complete the written survey that you may receive in the mail after your visit with us. Thank you!             Your Updated Medication List - Protect others around you: Learn how to safely use, store and throw away your medicines at www.disposemymeds.org.          This list is accurate as of 2/26/18  4:42 PM.  Always use your most recent med list.                   Brand Name Dispense Instructions for use Diagnosis    albuterol 108 (90 BASE) MCG/ACT Inhaler    PROAIR HFA/PROVENTIL HFA/VENTOLIN HFA    1 Inhaler    Inhale 2 puffs into the lungs every 6 hours as needed for shortness of breath / dyspnea or wheezing    Mild intermittent asthma without complication       ALEVE 220 MG capsule   Generic drug:  naproxen sodium           atorvastatin 20 MG tablet    LIPITOR    90 tablet    Take 1 tablet (20 mg) by mouth daily    Cerebrovascular accident (CVA), unspecified  mechanism (H)       Blood Pressure Monitoring Kit     1 kit    1 Device daily Pt to check BP readings daily at home    BMI 40.0-44.9, adult (H)       cetirizine 10 MG Chew    zyrTEC    30 tablet    Take 1 tablet (10 mg) by mouth as needed    Chronic allergic rhinitis, unspecified seasonality, unspecified trigger       clopidogrel 75 MG tablet    PLAVIX    30 tablet    Take 1 tablet (75 mg) by mouth daily    Acute CVA (cerebrovascular accident) (H)       DIPHENHYDRAMINE HCL PO      Take 25 mg by mouth nightly as needed        fluticasone 50 MCG/ACT spray    FLONASE ALLERGY RELIEF    1 Bottle    Spray 2 sprays into both nostrils daily    Chronic allergic rhinitis, unspecified seasonality, unspecified trigger       lisinopril 20 MG tablet    PRINIVIL/ZESTRIL    180 tablet    Take 1 tablet (20 mg) by mouth 2 times daily    Benign essential hypertension       pantoprazole 40 MG EC tablet    PROTONIX    90 tablet    Take 1 tablet (40 mg) by mouth daily    Gastroesophageal reflux disease without esophagitis       potassium chloride SA 10 MEQ CR tablet    K-DUR/KLOR-CON M    180 tablet    Take 2 tablets (20 mEq) by mouth daily    Hypokalemia

## 2018-02-26 NOTE — PROGRESS NOTES
[facility]  Primary Care Center   Lilli Ivan MD  02/26/2018      Chief Complaint:   Cerebrovascular Accident (Patient is here to follow up on stroke. )       History of Present Illness:   Natali Myers is a 58 year old female with a history of hyperlipidemia, hypertension, and stroke in the right frontal palatine junction noted on July 18, 2016  who presents to follow up on an ED admittance for a stroke, hypertension, and low potassium. This was her second stroke. After her first stroke she was treated prophylactically with aspirin. Her second stroke occurred during a meeting at work.  She presented with dysarthria, right facial droop, and left leg weakness.  She was taken to the hospital and began treatment within an hour. She had no TIAs preceding this incident. Her anticoagulant and hypertension medications were changed on discharge from the ED.   Her course was complicated by hypokalemia     She saw my colleague, Sergo Dean last week and is currently taking lisinopril 20 mg daily plus clopidogrel and no potassium.  We reviewed all the laboratory studies from hospital and found in addition to the hypokalemia, stable GFR, and a normal magnesium level of 2.0.  She eats bananas, potatose and takes a multivitamin that includes potassium. The patient inquired after symptoms of high potassium in the event of overcompensation. She denies abdominal striae. She has slight swelling in her lower legs, but had been at work for 8 hours already.     Blood pressure control: We reviewed several months of twice daily blood pressures and typically the morning blood pressure is in the 135-137 range in the morning and as high as 1:45 in the afternoon.        Review of Systems:   Pertinent items are noted in HPI.  All other systems are negative.    Active Medications:     Current Outpatient Prescriptions:      potassium chloride SA (K-DUR/KLOR-CON M) 10 MEQ CR tablet, Take 2 tablets (20 mEq) by mouth daily, Disp:  180 tablet, Rfl: 1     lisinopril (PRINIVIL/ZESTRIL) 20 MG tablet, Take 1 tablet (20 mg) by mouth daily, Disp: 30 tablet, Rfl: 1     clopidogrel (PLAVIX) 75 MG tablet, Take 1 tablet (75 mg) by mouth daily, Disp: 30 tablet, Rfl: 11     pantoprazole (PROTONIX) 40 MG EC tablet, Take 1 tablet (40 mg) by mouth daily, Disp: 90 tablet, Rfl: 3     atorvastatin (LIPITOR) 20 MG tablet, Take 1 tablet (20 mg) by mouth daily, Disp: 90 tablet, Rfl: 3     fluticasone (FLONASE ALLERGY RELIEF) 50 MCG/ACT spray, Spray 2 sprays into both nostrils daily, Disp: 1 Bottle, Rfl: 3     cetirizine (ZYRTEC) 10 MG CHEW, Take 1 tablet (10 mg) by mouth as needed, Disp: 30 tablet, Rfl: 3     naproxen sodium (ALEVE) 220 MG capsule, , Disp: , Rfl:      albuterol (PROAIR HFA/PROVENTIL HFA/VENTOLIN HFA) 108 (90 BASE) MCG/ACT Inhaler, Inhale 2 puffs into the lungs every 6 hours as needed for shortness of breath / dyspnea or wheezing, Disp: 1 Inhaler, Rfl: 1     DIPHENHYDRAMINE HCL PO, Take 25 mg by mouth nightly as needed , Disp: , Rfl:      Blood Pressure Monitoring KIT, 1 Device daily Pt to check BP readings daily at home, Disp: 1 kit, Rfl: 0      Allergies:   Mold; Seasonal allergies; Codeine; Latex; No clinical screening - see comments; and Penicillins      Past Medical History:  Acute ischemic stroke    Anemia  Arthritis, OA left hip, knees, wrists  Asthma with bronchitis   BMI 40.0-44.9, adult    CARDIOVASCULAR SCREENING; LDL GOAL LESS THAN 160  Chronic headaches   Esophageal reflux (GERD)  GERD (gastroesophageal reflux disease)   Neck pain   Pre-op exam  S/P total hysterectomy and bilateral salpingo-oophorectomy  Seasonal allergies, mold and grass  Stroke (cerebrum)      Past Surgical History:  DILATION AND CURETTAGE   HYSTERECTOMY TOTAL ABDOMINAL    KNEE SURGERY  RELEASE CARPAL TUNNEL BILATERAL   SALPINGO-OOPHORECTOMY BILATERAL  WRIST SURGERY  Family History:   Mother: diabetes, blood diseaee, kidney disease  Father: cardiovascular disease,  alcohol/drug use, dementia  Mathernal grandmother: diabetes, thyroid diseae  Paternal grandmother: cancer, dementia      Social History:   Tobacco use: never  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     Physical Exam:   /85  Pulse 86  Resp 16  Wt 113.9 kg (251 lb)  LMP  (LMP Unknown)  SpO2 95%  Breastfeeding? No  BMI 46.66 kg/m2   Extremities: Trace edema in legs. Joints are normal. No gross deformities noted, gait normal and normal muscle tone.        Assessment and Plan:  Natali was seen today for cerebrovascular accident.    Diagnoses and all orders for this visit:    Benign essential hypertension  -     Potassium; Standing  -     lisinopril (PRINIVIL/ZESTRIL) 20 MG tablet; Take 1 tablet (20 mg) by mouth 2 times daily  Differential diagnosis for essential hypertension in this patient with hypokalemia includes Conn syndrome and Cushing syndrome.     PLAN:   Take potassium chloride 40 mEq by mouth daily   check K weekly    check cortisol levels  Nephrology for consideration of Conn syndrome and Cushing syndrome.    Follow-up:   To be scheduled after consultations and ZIOPATCH result   f/u with K+ reading in 3 days        I spent a total of 25 minutes face-to-face with Natali Myers during today's office visit.  Over 50% of this time was spent counseling the patient and/or coordinating care regarding hypertension benny hypokalemia after CVA.  See note for details.    Scribe Disclosure:   I, Adam Reyna, am serving as a scribe to document services personally performed by Lilli Ivan MD at this visit, based upon the provider's statements to me. All documentation has been reviewed by the aforementioned provider prior to being entered into the official medical record.     Portions of this medical record were completed by a scribe. UPON MY REVIEW AND AUTHENTICATION BY ELECTRONIC SIGNATURE, this confirms (a) I performed the applicable clinical services, and (b) the  record is accurate.     Lilli Ivan MD

## 2018-02-26 NOTE — PROGRESS NOTES
Rooming Note  Health Maintenance   Health Maintenance Due   Topic Date Due     ASTHMA ACTION PLAN Q1 YR  01/07/1965     ASTHMA CONTROL TEST Q6 MOS  01/07/1965    All health maintenance items UP TO DATE  Blood Pressure   BP Readings from Last 1 Encounters:   02/26/18 144/86    Single BP recheck started, 4:12 PM (4 minutes)    Sol Hicks LPN at 4:12 PM on 2/26/2018.

## 2018-02-26 NOTE — PATIENT INSTRUCTIONS
Primary Care Center: [phone]     [facility] Medication Refill Request Information:  * Please contact your pharmacy regarding ANY request for medication refills.  ** [facility] = [phone]  * Please allow 3 business days for routine medication refills.  * Please allow 5 business days for controlled substance medication refills.     Primary Care Center Test Result notification information:  *You will be notified with in 7-10 days of your appointment day regarding the results of your test.  If you are on MyChart you will be notified as soon as the provider has reviewed the results and signed off on them.      Lab: [phone] (1st Floor [facility])    Nephrology: [phone] (3rd Floor [facility])         1.  Please check the potassium level in 3 days.  Then weekly  2.  IN three days, check the serum cortisol level, in the AM     Referral to Nephrology for HTN and low K    Natali was seen today for cerebrovascular accident.    Diagnoses and all orders for this visit:    Benign essential hypertension  -     Potassium; Standing  -     lisinopril (PRINIVIL/ZESTRIL) 20 MG tablet; Take 1 tablet (20 mg) by mouth 2 times daily      Send me your BP reading via [employer] please

## 2018-02-26 NOTE — NURSING NOTE
Chief Complaint   Patient presents with     Cerebrovascular Accident     Patient is here to follow up on stroke.      Sol Hicks LPN at 4:09 PM on 2/26/2018.

## 2018-03-01 DIAGNOSIS — I10 BENIGN ESSENTIAL HYPERTENSION: ICD-10-CM

## 2018-03-01 DIAGNOSIS — E87.6 HYPOKALEMIA: ICD-10-CM

## 2018-03-01 LAB
CORTIS SERPL-MCNC: 12.2 UG/DL (ref 4–22)
POTASSIUM SERPL-SCNC: 4 MMOL/L (ref 3.4–5.3)

## 2018-03-06 DIAGNOSIS — I10 ESSENTIAL HYPERTENSION, BENIGN: Primary | ICD-10-CM

## 2018-03-07 ENCOUNTER — OFFICE VISIT (OUTPATIENT)
Dept: NEPHROLOGY | Facility: CLINIC | Age: 58
End: 2018-03-07
Attending: INTERNAL MEDICINE
Payer: COMMERCIAL

## 2018-03-07 VITALS
SYSTOLIC BLOOD PRESSURE: 121 MMHG | BODY MASS INDEX: 45.3 KG/M2 | HEIGHT: 62 IN | DIASTOLIC BLOOD PRESSURE: 81 MMHG | HEART RATE: 103 BPM | OXYGEN SATURATION: 97 % | WEIGHT: 246.2 LBS

## 2018-03-07 DIAGNOSIS — I10 ESSENTIAL HYPERTENSION, BENIGN: Primary | ICD-10-CM

## 2018-03-07 DIAGNOSIS — I63.9 CEREBROVASCULAR ACCIDENT (CVA), UNSPECIFIED MECHANISM (H): ICD-10-CM

## 2018-03-07 DIAGNOSIS — I10 ESSENTIAL HYPERTENSION, BENIGN: ICD-10-CM

## 2018-03-07 DIAGNOSIS — I10 BENIGN ESSENTIAL HYPERTENSION: ICD-10-CM

## 2018-03-07 LAB
ALBUMIN SERPL-MCNC: 4 G/DL (ref 3.4–5)
ALBUMIN UR-MCNC: NEGATIVE MG/DL
ANION GAP SERPL CALCULATED.3IONS-SCNC: 10 MMOL/L (ref 3–14)
APPEARANCE UR: CLEAR
BACTERIA #/AREA URNS HPF: ABNORMAL /HPF
BILIRUB UR QL STRIP: NEGATIVE
BUN SERPL-MCNC: 12 MG/DL (ref 7–30)
CALCIUM SERPL-MCNC: 8.8 MG/DL (ref 8.5–10.1)
CHLORIDE SERPL-SCNC: 105 MMOL/L (ref 94–109)
CO2 SERPL-SCNC: 23 MMOL/L (ref 20–32)
COLOR UR AUTO: ABNORMAL
CREAT SERPL-MCNC: 0.86 MG/DL (ref 0.52–1.04)
CREAT UR-MCNC: 30 MG/DL
ERYTHROCYTE [DISTWIDTH] IN BLOOD BY AUTOMATED COUNT: 12.2 % (ref 10–15)
GFR SERPL CREATININE-BSD FRML MDRD: 68 ML/MIN/1.7M2
GLUCOSE SERPL-MCNC: 143 MG/DL (ref 70–99)
GLUCOSE UR STRIP-MCNC: NEGATIVE MG/DL
HCT VFR BLD AUTO: 43.8 % (ref 35–47)
HGB BLD-MCNC: 15.2 G/DL (ref 11.7–15.7)
HGB UR QL STRIP: NEGATIVE
KETONES UR STRIP-MCNC: NEGATIVE MG/DL
LEUKOCYTE ESTERASE UR QL STRIP: NEGATIVE
MCH RBC QN AUTO: 32.8 PG (ref 26.5–33)
MCHC RBC AUTO-ENTMCNC: 34.7 G/DL (ref 31.5–36.5)
MCV RBC AUTO: 94 FL (ref 78–100)
MICROALBUMIN UR-MCNC: <5 MG/L
MICROALBUMIN/CREAT UR: NORMAL MG/G CR (ref 0–25)
NITRATE UR QL: NEGATIVE
PH UR STRIP: 5 PH (ref 5–7)
PHOSPHATE SERPL-MCNC: 3.1 MG/DL (ref 2.5–4.5)
PLATELET # BLD AUTO: 210 10E9/L (ref 150–450)
POTASSIUM SERPL-SCNC: 3.9 MMOL/L (ref 3.4–5.3)
POTASSIUM SERPL-SCNC: 3.9 MMOL/L (ref 3.4–5.3)
PROT UR-MCNC: <0.05 G/L
PROT/CREAT 24H UR: NORMAL G/G CR (ref 0–0.2)
RBC # BLD AUTO: 4.64 10E12/L (ref 3.8–5.2)
RBC #/AREA URNS AUTO: 1 /HPF (ref 0–2)
SODIUM SERPL-SCNC: 138 MMOL/L (ref 133–144)
SOURCE: ABNORMAL
SP GR UR STRIP: 1 (ref 1–1.03)
SQUAMOUS #/AREA URNS AUTO: <1 /HPF (ref 0–1)
UROBILINOGEN UR STRIP-MCNC: 0 MG/DL (ref 0–2)
WBC # BLD AUTO: 5.9 10E9/L (ref 4–11)
WBC #/AREA URNS AUTO: 1 /HPF (ref 0–5)

## 2018-03-07 PROCEDURE — 85027 COMPLETE CBC AUTOMATED: CPT | Performed by: INTERNAL MEDICINE

## 2018-03-07 PROCEDURE — 82043 UR ALBUMIN QUANTITATIVE: CPT | Performed by: INTERNAL MEDICINE

## 2018-03-07 PROCEDURE — 84244 ASSAY OF RENIN: CPT | Performed by: INTERNAL MEDICINE

## 2018-03-07 PROCEDURE — 36415 COLL VENOUS BLD VENIPUNCTURE: CPT | Performed by: INTERNAL MEDICINE

## 2018-03-07 PROCEDURE — G0463 HOSPITAL OUTPT CLINIC VISIT: HCPCS | Mod: ZF

## 2018-03-07 PROCEDURE — 80069 RENAL FUNCTION PANEL: CPT | Performed by: INTERNAL MEDICINE

## 2018-03-07 PROCEDURE — 82088 ASSAY OF ALDOSTERONE: CPT | Performed by: INTERNAL MEDICINE

## 2018-03-07 PROCEDURE — 84156 ASSAY OF PROTEIN URINE: CPT | Performed by: INTERNAL MEDICINE

## 2018-03-07 PROCEDURE — 81001 URINALYSIS AUTO W/SCOPE: CPT | Performed by: INTERNAL MEDICINE

## 2018-03-07 ASSESSMENT — ENCOUNTER SYMPTOMS
SPEECH CHANGE: 0
MEMORY LOSS: 0
BRUISES/BLEEDS EASILY: 0
SMELL DISTURBANCE: 0
NECK MASS: 0
LOSS OF CONSCIOUSNESS: 0
BACK PAIN: 0
PARALYSIS: 0
DIZZINESS: 1
SINUS CONGESTION: 1
WEAKNESS: 0
MUSCLE CRAMPS: 1
NECK PAIN: 0
NUMBNESS: 0
DISTURBANCES IN COORDINATION: 1
SINUS PAIN: 0
TREMORS: 0
SWOLLEN GLANDS: 0
MYALGIAS: 1
ARTHRALGIAS: 1
STIFFNESS: 1
TROUBLE SWALLOWING: 0
SORE THROAT: 0
JOINT SWELLING: 0
TINGLING: 0
TASTE DISTURBANCE: 1
MUSCLE WEAKNESS: 0
SEIZURES: 0
HOARSE VOICE: 0
HEADACHES: 1

## 2018-03-07 ASSESSMENT — PAIN SCALES - GENERAL: PAINLEVEL: NO PAIN (0)

## 2018-03-07 NOTE — MR AVS SNAPSHOT
After Visit Summary   3/7/2018    Natali Myers    MRN: [id number]           Patient Information     Date Of Birth          1960        Visit Information        Provider Department      3/7/2018 12:30 PM Arnav Mccurdy MD [facility]        Today's Diagnoses     Essential hypertension, benign    -  1    Benign essential hypertension        Cerebrovascular accident (CVA), unspecified mechanism (H)           Follow-ups after your visit        Follow-up notes from your care team     Return in about 6 months (around 9/7/2018).      Your next 10 appointments already scheduled     Apr 03, 2018  2:20 PM CDT   (Arrive by 2:05 PM)   New Stroke with José Miguel Snyder MD   [facility] ([facility])    [address]  3rd Floor  [facility] [id number] [phone]            Aug 29, 2018 11:30 AM CDT   Lab with  LAB   [facility] ([facility])    [address]  1st Floor  [facility] [id number] [phone]            Aug 29, 2018 12:30 PM CDT   (Arrive by 12:00 PM)   Return Visit with Arnav Mccurdy MD   [facility] ([facility])    [address]  [address]  [facility] [id number] [phone]              Future tests that were ordered for you today     Open Future Orders        Priority Expected Expires Ordered    Renin activity Routine  4/6/2018 3/7/2018    Aldosterone Routine  4/6/2018 3/7/2018            Who to contact     If you have questions or need follow up information about today's clinic visit or your schedule please contact [facility] NEPHROLOGY directly at [phone].  Normal or non-critical lab and imaging results will be communicated to you by MyChart, letter or phone within 4 business days after the clinic has received the results. If you do not hear from us within 7 days, please contact the clinic through MyChart or phone. If you have a  "critical or abnormal lab result, we will notify you by phone as soon as possible.  Submit refill requests through [employer] or call your pharmacy and they will forward the refill request to us. Please allow 3 business days for your refill to be completed.          Additional Information About Your Visit        [facility] [employer] Information     [employer] gives you secure access to your electronic health record. If you see a primary care provider, you can also send messages to your care team and make appointments. If you have questions, please call your primary care clinic.  If you do not have a primary care provider, please call [phone] and they will assist you.        Care EveryWhere ID     This is your Care EveryWhere ID. This could be used by other organizations to access your [place] medical records  [id number]        Your Vitals Were     Pulse Height Last Period Pulse Oximetry BMI (Body Mass Index)       103 1.562 m (5' 1.5\") (LMP Unknown) 97% 45.77 kg/m2        Blood Pressure from Last 3 Encounters:   03/07/18 121/81   02/26/18 137/85   02/13/18 161/90    Weight from Last 3 Encounters:   03/07/18 111.7 kg (246 lb 3.2 oz)   02/26/18 113.9 kg (251 lb)   02/13/18 112.4 kg (247 lb 14.4 oz)               Primary Care Provider Office Phone # Fax #    Lillichristianne Ivan [phone]       [address] [address]        Equal Access to Services     [facility]: Hadii luther clifford hadasho Soomaali, waaxda luqadaha, qaybta kaalmada bladeegyada, nima sargent. So [facility] [phone].    ATENCIÓN: Si habla español, tiene a ojeda disposición servicios gratuitos de asistencia lingüística. Llame al [phone].    We comply with applicable federal civil rights laws and [place] laws. We do not discriminate on the basis of race, color, national origin, age, disability, sex, sexual orientation, or gender identity.            Thank you!     Thank you for choosing [facility] " NEPHROLOGY  for your care. Our goal is always to provide you with excellent care. Hearing back from our patients is one way we can continue to improve our services. Please take a few minutes to complete the written survey that you may receive in the mail after your visit with us. Thank you!             Your Updated Medication List - Protect others around you: Learn how to safely use, store and throw away your medicines at www.disposemymeds.org.          This list is accurate as of 3/7/18  1:06 PM.  Always use your most recent med list.                   Brand Name Dispense Instructions for use Diagnosis    albuterol 108 (90 BASE) MCG/ACT Inhaler    PROAIR HFA/PROVENTIL HFA/VENTOLIN HFA    1 Inhaler    Inhale 2 puffs into the lungs every 6 hours as needed for shortness of breath / dyspnea or wheezing    Mild intermittent asthma without complication       ALEVE 220 MG capsule   Generic drug:  naproxen sodium           atorvastatin 20 MG tablet    LIPITOR    90 tablet    Take 1 tablet (20 mg) by mouth daily    Cerebrovascular accident (CVA), unspecified mechanism (H)       Blood Pressure Monitoring Kit     1 kit    1 Device daily Pt to check BP readings daily at home    BMI 40.0-44.9, adult (H)       cetirizine 10 MG Chew    zyrTEC    30 tablet    Take 1 tablet (10 mg) by mouth as needed    Chronic allergic rhinitis, unspecified seasonality, unspecified trigger       clopidogrel 75 MG tablet    PLAVIX    30 tablet    Take 1 tablet (75 mg) by mouth daily    Acute CVA (cerebrovascular accident) (H)       DIPHENHYDRAMINE HCL PO      Take 25 mg by mouth nightly as needed        fluticasone 50 MCG/ACT spray    FLONASE ALLERGY RELIEF    1 Bottle    Spray 2 sprays into both nostrils daily    Chronic allergic rhinitis, unspecified seasonality, unspecified trigger       lisinopril 20 MG tablet    PRINIVIL/ZESTRIL    180 tablet    Take 1 tablet (20 mg) by mouth 2 times daily    Benign essential hypertension       pantoprazole 40  MG EC tablet    PROTONIX    90 tablet    Take 1 tablet (40 mg) by mouth daily    Gastroesophageal reflux disease without esophagitis       potassium chloride SA 10 MEQ CR tablet    K-DUR/KLOR-CON M    180 tablet    Take 2 tablets (20 mEq) by mouth daily    Hypokalemia

## 2018-03-07 NOTE — LETTER
3/7/2018      RE: Natali Myers  [address]  [facility] [address]       Nephrology consult  Reason for consult: hypertension  Consulting provider: Dr Ivan    59yo wF with a PMHx of stroke and hypertension here for evaluation. She was first treated for hypertension after her first stroke ~2yr ago. She had a second stroke 1/31/18. Her BP had been relatively well controlled but she developed hypokalemia and was switched from HCTZ to lisinopril 3 weeks ago. She has chronic joint pain for which she takes naproxen. She also has HAs with stress at work. She is otherwise well and comprehensive ROS is negative.    She does not add Na to her food but does eat canned/frozen foods. She has no daytime somnolence and sleeps well. She does not smoke or drink.    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  Hypertension - likely essential but hypokalemia raises the possibility of hyperaldosteronism. History not suggestive of sleep apnea. Cortisol normal. Diet is likely moderate to high in Na. Naproxen also contributing although it is the only thing that provides relief so after communicating  that it likely raises her BP, she plans to continue taking it. BP currently well controlled (target SBP <130mmHg reasonable).   - encouraged increased intake of fruits/vegetables (DASH type diet)   - avoid NSAIDs if possible   - continue current medications   - check renin/carlos   - consider renal duplex at follow up    RTC in 6 months      Arnav Mccurdy MD

## 2018-03-07 NOTE — PROGRESS NOTES
Nephrology consult  Reason for consult: hypertension  Consulting provider: Dr Ivan    59yo wF with a PMHx of stroke and hypertension here for evaluation. She was first treated for hypertension after her first stroke ~2yr ago. She had a second stroke 1/31/18. Her BP had been relatively well controlled but she developed hypokalemia and was switched from HCTZ to lisinopril 3 weeks ago. She has chronic joint pain for which she takes naproxen. She also has HAs with stress at work. She is otherwise well and comprehensive ROS is negative.    She does not add Na to her food but does eat canned/frozen foods. She has no daytime somnolence and sleeps well. She does not smoke or drink.    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  Hypertension - likely essential but hypokalemia raises the possibility of hyperaldosteronism. History not suggestive of sleep apnea. Cortisol normal. Diet is likely moderate to high in Na. Naproxen also contributing although it is the only thing that provides relief so after communicating that it likely raises her BP, she plans to continue taking it. BP currently well  controlled (target SBP <130mmHg reasonable).   - encouraged increased intake of fruits/vegetables (DASH type diet)   - avoid NSAIDs if possible   - continue current medications   - check renin/carlos   - consider renal duplex at follow up    RTC in 6 months

## 2018-03-09 LAB — ALDOST SERPL-MCNC: 7.1 NG/DL

## 2018-03-11 LAB — RENIN PLAS-CCNC: 79 NG/ML/HR

## 2018-03-12 ENCOUNTER — TELEPHONE (OUTPATIENT)
Dept: NEPHROLOGY | Facility: CLINIC | Age: 58
End: 2018-03-12

## 2018-03-12 DIAGNOSIS — R79.89 HIGH SERUM RENIN: Primary | ICD-10-CM

## 2018-03-12 NOTE — TELEPHONE ENCOUNTER
Per Dr. Mccurdy:    Please let Mrs Natali Myers know that her renin level is elevated. This might be due to a narrowing of the arteries to her kidneys. Please order a duplex US of the kidneys to further evaluate.     Called patient, who verbalized understanding. Order placed.    Susannah Dao RN

## 2018-03-16 ENCOUNTER — RADIANT APPOINTMENT (OUTPATIENT)
Dept: ULTRASOUND IMAGING | Facility: CLINIC | Age: 58
End: 2018-03-16
Attending: INTERNAL MEDICINE
Payer: COMMERCIAL

## 2018-03-16 DIAGNOSIS — R79.89 HIGH SERUM RENIN: ICD-10-CM

## 2018-03-16 DIAGNOSIS — I10 BENIGN ESSENTIAL HYPERTENSION: ICD-10-CM

## 2018-03-16 LAB — POTASSIUM SERPL-SCNC: 4.1 MMOL/L (ref 3.4–5.3)

## 2018-03-22 ENCOUNTER — TELEPHONE (OUTPATIENT)
Dept: NEPHROLOGY | Facility: CLINIC | Age: 58
End: 2018-03-22

## 2018-03-22 NOTE — TELEPHONE ENCOUNTER
Per Dr. Mccurdy:    Please let Mrs Natali Myers know that her ultrasound was normal - there was no narrowing of the arteries to the kidneys.     Left detailed voicemail updating patient. Will mail letter with results.    Susannah Dao RN

## 2018-03-27 ASSESSMENT — ENCOUNTER SYMPTOMS
TINGLING: 0
STIFFNESS: 1
NUMBNESS: 0
HEADACHES: 1
NECK PAIN: 1
DISTURBANCES IN COORDINATION: 0
ARTHRALGIAS: 1
MEMORY LOSS: 0
SEIZURES: 0
DIZZINESS: 0
WEAKNESS: 0
PARALYSIS: 0
SPEECH CHANGE: 0
TREMORS: 0
MYALGIAS: 1
LOSS OF CONSCIOUSNESS: 0

## 2018-04-03 ENCOUNTER — OFFICE VISIT (OUTPATIENT)
Dept: NEUROLOGY | Facility: CLINIC | Age: 58
End: 2018-04-03
Payer: COMMERCIAL

## 2018-04-03 ENCOUNTER — APPOINTMENT (OUTPATIENT)
Dept: LAB | Facility: CLINIC | Age: 58
End: 2018-04-03
Payer: COMMERCIAL

## 2018-04-03 VITALS
HEART RATE: 80 BPM | BODY MASS INDEX: 45.77 KG/M2 | HEIGHT: 62 IN | DIASTOLIC BLOOD PRESSURE: 81 MMHG | WEIGHT: 248.7 LBS | SYSTOLIC BLOOD PRESSURE: 123 MMHG

## 2018-04-03 DIAGNOSIS — I63.9 ACUTE ISCHEMIC STROKE (H): Primary | ICD-10-CM

## 2018-04-03 DIAGNOSIS — I10 ESSENTIAL HYPERTENSION, BENIGN: ICD-10-CM

## 2018-04-03 LAB — FIBRINOGEN PPP-MCNC: 418 MG/DL (ref 200–420)

## 2018-04-03 ASSESSMENT — PAIN SCALES - GENERAL: PAINLEVEL: NO PAIN (0)

## 2018-04-03 NOTE — MR AVS SNAPSHOT
After Visit Summary   4/3/2018    Natali Myers    MRN: [id number]           Patient Information     Date Of Birth          1960        Visit Information        Provider Department      4/3/2018 2:20 PM José Miguel Snyder MD [facility] Neurology        Today's Diagnoses     Acute ischemic stroke (H)    -  1    Essential hypertension, benign           Follow-ups after your visit        Follow-up notes from your care team     Return in about 4 weeks (around 5/1/2018).      Your next 10 appointments already scheduled     Apr 09, 2018  8:30 AM CDT   Ech Herminio with [id number]   [facility], [place],  Echocardiography ([facility])    [address]  [facility] [place] [address]   [phone]           1.  Please bring or wear a comfortable two-piece outfit. 2.  Arrival time: -   [facility]:  arrive 75 minutes prior to examination time. -   [facility]:  arrive 90 minutes prior to examination time. -   [facility]:   arrive 15 minutes prior to examination time. 3.  Plan to have someone here to drive you home after the test. -   Someone should stay with you for 6 hours after your test. 4.  No food or drink: -   6 hours before the test 5.  If you take antacids or water pills (diuretics): Do not take them until after your test. You may take blood pressure medicine with a few sips of water. 6.  If you have diabetes: -   Morning slots preferred -   If you take insulin, call your diabetes care team. Ask if you should take a   dose the morning of your test. -   If you take diabetes medicine by mouth, don't take it on the morning of your test. Bring it with you to take after the test. (If you have questions, call your diabetes care team.) 7.  Bring a list of any medicines you are taking. 8.  Do not drive for 24 hours after the test. 9.   A responsible adult must stay with you for 24 hours after the test.  10.  For any questions that cannot be answered, please contact the  ordering physician            Apr 24, 2018 11:50 AM CDT   (Arrive by 11:35 AM)   Return Stroke with José Miguel Snyder MD   [facility] ([facility])    [address]  3rd Floor  [place] [id number] [phone]            Aug 29, 2018 11:30 AM CDT   Lab with [facility]   [facility] ([facility])    [address]  1st Floor  [place] [id number] [phone]            Aug 29, 2018 12:30 PM CDT   (Arrive by 12:00 PM)   Return Visit with Arnav Mccurdy MD   [facility]ph[facility] ([facility])    [address]  [address]  [place] [id number] [phone]              Future tests that were ordered for you today     Open Future Orders        Priority Expected Expires Ordered    Transesophageal Echocardiogram Routine  4/3/2019 4/3/2018            Who to contact     Please call your clinic at [phone] to:    Ask questions about your health    Make or cancel appointments    Discuss your medicines    Learn about your test results    Speak to your doctor            Additional Information About Your Visit        Vello App Information     Vello App gives you secure access to your electronic health record. If you see a primary care provider, you can also send messages to your care team and make appointments. If you have questions, please call your primary care clinic.  If you do not have a primary care provider, please call [phone] and they will assist you.      Vello App is an electronic gateway that provides easy, online access to your medical records. With Vello App, you can request a clinic appointment, read your test results, renew a prescription or communicate with your care team.     To access your existing account, please contact your [facility] or call [phone] for assistance.        Care EveryWhere ID     This is your Care EveryWhere ID. This could be  "used by other organizations to access your [place] medical records  [id number]        Your Vitals Were     Pulse Height Last Period BMI (Body Mass Index)          80 1.562 m (5' 1.5\") (LMP Unknown) 46.23 kg/m2         Blood Pressure from Last 3 Encounters:   04/03/18 123/81   03/07/18 121/81   02/26/18 137/85    Weight from Last 3 Encounters:   04/03/18 112.8 kg (248 lb 11.2 oz)   03/07/18 111.7 kg (246 lb 3.2 oz)   02/26/18 113.9 kg (251 lb)              We Performed the Following     Activated protein C resistance     Anti Nuclear Alyx IgG by IFA with Reflex     Antithrombin III     Beta 2 Glycoprotein 1 Antibody IgG     Beta 2 Glycoprotein 1 Antibody IgM     Cardiolipin Alyx IgG and IgM     Factor 8 assay     Fibrinogen activity     FIBRINOGEN ANTIGEN     Homocysteine     Lupus Anticoagulant Panel     Protein C chromogenic     Protein S Antigen Free        Primary Care Provider Office Phone # Fax #    Lilli Ivan [phone]       [address] [address]        Equal Access to Services     [facility]: Hadii aad ku hadasho Soomaali, waaxda luqadaha, qaybta kaalmada adeegyapenny, nima savage . So [facility] [phone].    ATENCIÓN: Si habla español, tiene a ojeda disposición servicios gratrodrigoos de asistencia lingüística. [facility] [phone].    We comply with applicable federal civil rights laws and [place] laws. We do not discriminate on the basis of race, color, national origin, age, disability, sex, sexual orientation, or gender identity.            Thank you!     Thank you for choosing [facility]UROLOG[facility]  for your care. Our goal is always to provide you with excellent care. Hearing back from our patients is one way we can continue to improve our services. Please take a few minutes to complete the written survey that you may receive in the mail after your visit with us. Thank you!             Your Updated Medication List - Protect others " around you: Learn how to safely use, store and throw away your medicines at www.disposemymeds.org.          This list is accurate as of 4/3/18  3:46 PM.  Always use your most recent med list.                   Brand Name Dispense Instructions for use Diagnosis    albuterol 108 (90 BASE) MCG/ACT Inhaler    PROAIR HFA/PROVENTIL HFA/VENTOLIN HFA    1 Inhaler    Inhale 2 puffs into the lungs every 6 hours as needed for shortness of breath / dyspnea or wheezing    Mild intermittent asthma without complication       ALEVE 220 MG capsule   Generic drug:  naproxen sodium           atorvastatin 20 MG tablet    LIPITOR    90 tablet    Take 1 tablet (20 mg) by mouth daily    Cerebrovascular accident (CVA), unspecified mechanism (H)       Blood Pressure Monitoring Kit     1 kit    1 Device daily Pt to check BP readings daily at home    BMI 40.0-44.9, adult (H)       cetirizine 10 MG Chew    zyrTEC    30 tablet    Take 1 tablet (10 mg) by mouth as needed    Chronic allergic rhinitis, unspecified seasonality, unspecified trigger       clopidogrel 75 MG tablet    PLAVIX    30 tablet    Take 1 tablet (75 mg) by mouth daily    Acute CVA (cerebrovascular accident) (H)       DIPHENHYDRAMINE HCL PO      Take 25 mg by mouth nightly as needed        fluticasone 50 MCG/ACT spray    FLONASE ALLERGY RELIEF    1 Bottle    Spray 2 sprays into both nostrils daily    Chronic allergic rhinitis, unspecified seasonality, unspecified trigger       lisinopril 20 MG tablet    PRINIVIL/ZESTRIL    180 tablet    Take 1 tablet (20 mg) by mouth 2 times daily    Benign essential hypertension       pantoprazole 40 MG EC tablet    PROTONIX    90 tablet    Take 1 tablet (40 mg) by mouth daily    Gastroesophageal reflux disease without esophagitis       potassium chloride SA 10 MEQ CR tablet    K-DUR/KLOR-CON M    180 tablet    Take 2 tablets (20 mEq) by mouth daily    Hypokalemia

## 2018-04-03 NOTE — PROGRESS NOTES
[facility] NEUROLOGY  [address]  3rd [address]  [facility] [address]          April 3, 2018    Natali Myers                                                                                                                     [address] [facility] [address]      Ms. Myers is a very pleasant 58 year old lady who has had at least 2 symptomatic strokes in the past 3 years. The first stroke was in 2016 where she presented with a left leg numbness and weakness and a brain MRI showed a small infarct close to or touching the central sulcus and extending into the fronto-parietal white matter. Head MRA did not show any explanatory stenosis and neck MRA was significant for stenosis of the origins of the vertebral arteries but otherwise unremarkable. Her second stroke was in January of 2018 when she presented with L sided weakness and an NIHSS of 11. There was extinction, weakness, sensory loss and dysarthria. CTA suggested M2 occlusion and she received IV tPA and also mechanical thrombectomy. Subsequent brain MRI did not show an acute infarct. She is symptom free currently but still gets headaches which are new since the stroke. She feels that these are tension type headaches. We ordered a hypercoagulable work up today and this was largely normal except for a minimal elevation in FVIII (201 upper limit is 200) and FBG (401 upper limit is 353). The elevated FBG could potentially pre-dispose to thrombotic episodes. I do not see a FV and FII mutation tests and these will have to be ordered. She is on a statin and anti-HTN medications. She is followed by our Nephrology clinics and is waiting to hear back on Renin and Aldosterone tests. Ziopatch did not show AFIB. TTE showed mitral stenosis. The echo report suggests that the valve stenosis may be rheumatic. I have ordered a RUSTY and there was a call for pre-authorization. The study has already been authorized with a number [id number] and is valid  between 4/4 - 5/3/2018.      ASSESSMENT / PLAN    Stroke in young. Work up underway. TTE not completely normal due to mitral stenosis. Will consult cardiology. She has a strong family history will order FV [place] and FII prothrombin 47525 gene mutation. BP goal < 130/80.    Encounter Diagnoses   Name Primary?     Acute ischemic stroke (H) Yes     Essential hypertension, benign            1. RUSTY - see authorization above  2. Hypercoagulable work up - have ordered F II and F V mutations   3. Will consider an ILR if other tests are negative. She will discuss zio results with PCP but there was no AFIB.  4. Cardiology consult - patient to be called regarding this  5. Hematology consult - patient to be called regarding this    RTC after RUSTY    Family hx    Dad had a heart attack in his 30s  Dad's side has had a hx of heart attacks        TTE    Interpretation Summary  Global and regional left ventricular function is normal with an EF of 60-65%.  Grade 1 Diastolic dysfunction.  Right ventricular function, chamber size, wall motion, and thickness are  normal.  There is mild mitral stenosis with a mean gradient of 6 mmHg and a mitral  valve area of 1.7 cm^2 at a HR of 66 bpm. Mechanism appears rheumatic.  Other valves without significant dysfunction. Trace AI. Trace TR.  The inferior vena cava was normal in size with preserved respiratory  variability. Estimated mean right atrial pressure is 3 mmHg.  No pericardial effusion is present.          1/31/18    Angiogram    Impression:  1. Partially occlusive thrombosis in the first-degree branch of the  superior division of the right middle cerebral artery.  2. Successful mechanical thrombectomy using stentriever and proximal  flow arrest. TICI-3 recanalization.      Post angio MRI    Findings:   Diffusion weighted images demonstrate linear focus of restricted  diffusion in the right sylvian fissure which correlate with blooming  effect of SWI images at the same location. Overall  these findings are  suggestive of minimal blood products in the right sylvian fissure.  FLAIR weighted sequence demonstrates  subtle hyperintense signal along  the right superior temporal sulci likely reflecting minimal blood  products or engorged vessels.   There is no evidence of acute infarction.         Impression:  1. No evidence for acute infarction  2. Findings suggestive of minimal blood products in the right sylvian  fissure.     CTA   Impression:    1. Head CTA demonstrates no aneurysm or stenosis of the major  intracranial arteries.   2. Neck CTA demonstrates no stenosis of the major cervical arteries.   3. No evidence of intracranial hemorrhage on the noncontrast head CT.     July 2016        IMPRESSION:         1. Recent infarct in the central white matter of the right  frontoparietal junction.  2. Old infarcts in the right cerebellar hemisphere.  3. Mild generalized atrophy of the brain.            Past Medical History:   Diagnosis Date     Arthritis     OA left hip, knees, wrists     Asthma with bronchitis      Chronic headaches      GERD (gastroesophageal reflux disease)     omprazole     History of stroke without residual deficits 1/31/2018    headaches     Hypertension      Migraines     On-going, 1 every month or 2     Neck pain     trapezius tightness     Seasonal allergies     mold and grass     Stroke (H) July 18, 2016.    MRI scan that showed recent infarct in the central white matter of the right frontoparietal junction;       Current Outpatient Prescriptions   Medication     lisinopril (PRINIVIL/ZESTRIL) 20 MG tablet     potassium chloride SA (K-DUR/KLOR-CON M) 10 MEQ CR tablet     clopidogrel (PLAVIX) 75 MG tablet     pantoprazole (PROTONIX) 40 MG EC tablet     atorvastatin (LIPITOR) 20 MG tablet     fluticasone (FLONASE ALLERGY RELIEF) 50 MCG/ACT spray     cetirizine (ZYRTEC) 10 MG CHEW     naproxen sodium (ALEVE) 220 MG capsule     albuterol (PROAIR HFA/PROVENTIL HFA/VENTOLIN HFA) 108 (90  "BASE) MCG/ACT Inhaler     DIPHENHYDRAMINE HCL PO     Blood Pressure Monitoring KIT     No current facility-administered medications for this visit.      Social History   Substance Use Topics     Smoking status: Never Smoker     Smokeless tobacco: Never Used     Alcohol use Yes      Comment: One a month    No recreational drugs        EXAM    /81 (BP Location: Right arm, Patient Position: Sitting, Cuff Size: Adult Large)  Pulse 80  Ht 1.562 m (5' 1.5\")  Wt 112.8 kg (248 lb 11.2 oz)  LMP  (LMP Unknown)  BMI 46.23 kg/m2    Orientation: Normal; Language normal; Attention: 93 (1/5); DLROW; normal  Cranial nerves: 2-12 examined normal  Motor: FFM normal bilaterally; No drift; Strength normal in both UE and LE; SA EF EE WE FE; HF KF KE DF EHL  Sensory: no deficits to LT  Co-ordination normal in both UE and LE  Reflexes symmetric  Gait: normal base steady can walk on heels toes and tandem without difficulty    José Miguel Snyder MD    Answers for HPI/ROS submitted by the patient on 3/27/2018   General Symptoms: No  Skin Symptoms: No  HENT Symptoms: No  EYE SYMPTOMS: No  HEART SYMPTOMS: No  LUNG SYMPTOMS: No  INTESTINAL SYMPTOMS: No  URINARY SYMPTOMS: No  GYNECOLOGIC SYMPTOMS: No  BREAST SYMPTOMS: No  SKELETAL SYMPTOMS: Yes  BLOOD SYMPTOMS: No  NERVOUS SYSTEM SYMPTOMS: Yes  MENTAL HEALTH SYMPTOMS: No  Muscle aches: Yes  Neck pain: Yes  Joint pain: Yes  Joint stiffness: Yes  Trouble with coordination: No  Dizziness or trouble with balance: No  Fainting or black-out spells: No  Memory loss: No  Headache: Yes  Seizures: No  Speech problems: No  Tingling: No  Tremor: No  Weakness: No  Difficulty walking: No  Paralysis: No  Numbness: No    "

## 2018-04-03 NOTE — LETTER
4/3/2018       RE: Natali Myers  [address] [facility] [address]     Dear Colleague,    Thank you for referring your patient, Natali Myers, to the [facility] NEUROLOGY at [facility]. Please see a copy of my visit note below.          [facility] NEUROLOGY  [address]  3rd Floor  [facility] [address]          April 3, 2018    Natali Myers                                                                                                                     [address] [facility] [address]      Ms. Myers is a very pleasant 58 year old lady who has had at least 2 symptomatic strokes in the past 3 years. The first stroke was in 2016 where she presented with a left leg numbness and weakness and a brain MRI showed a small infarct close to or touching the central sulcus and extending into the fronto-parietal white matter. Head MRA did not show any explanatory stenosis and neck MRA was significant for stenosis of the origins of the vertebral arteries but otherwise unremarkable. Her second stroke was in January of 2018 when she presented with L sided weakness and an NIHSS of 11. There was extinction, weakness, sensory loss and dysarthria. CTA suggested M2 occlusion and she received IV tPA and also mechanical thrombectomy. Subsequent brain MRI did not show an acute infarct. She is symptom free currently but still gets headaches which are new since the stroke. She feels that these are tension type headaches. We ordered a hypercoagulable work up today and this was largely normal except for a minimal elevation in FVIII (201 upper limit is 200) and FBG (401 upper limit is 353). The elevated FBG could potentially pre-dispose to thrombotic episodes. I do not see a FV and FII mutation tests and these will have to be ordered. She is on a statin and anti-HTN medications. She is followed by our Nephrology clinics and is waiting to hear back on Renin  and Aldosterone tests. Ziopatch did not show AFIB. TTE showed mitral stenosis. The echo report suggests that the valve stenosis may be rheumatic. I have ordered a RUSTY and there was a call for pre-authorization. The study has already been authorized with a number [id number] and is valid between 4/4 - 5/3/2018.      ASSESSMENT / PLAN    Stroke in young. Work up underway. TTE not completely normal due to mitral stenosis. Will consult cardiology. She has a strong family history will order FV Leyden and FII prothrombin [id number] gene mutation. BP goal < 130/80.    Encounter Diagnoses   Name Primary?     Acute ischemic stroke (H) Yes     Essential hypertension, benign            1. RUSTY - see authorization above  2. Hypercoagulable work up - have ordered F II and F V mutations   3. Will consider an ILR if other tests are negative. She will discuss zio results with PCP but there was no AFIB.  4. Cardiology consult - patient to be called regarding this  5. Hematology consult - patient to be called regarding this    RTC after RUSTY    Family hx    Dad had a heart attack in his 30s  Dad's side has had a hx of heart attacks        TTE    Interpretation Summary  Global and regional left ventricular function is normal with an EF of 60-65%.  Grade 1 Diastolic dysfunction.  Right ventricular function, chamber size, wall motion, and thickness are  normal.  There is mild mitral stenosis with a mean gradient of 6 mmHg and a mitral  valve area of 1.7 cm^2 at a HR of 66 bpm. Mechanism appears rheumatic.  Other valves without significant dysfunction. Trace AI. Trace TR.  The inferior vena cava was normal in size with preserved respiratory  variability. Estimated mean right atrial pressure is 3 mmHg.  No pericardial effusion is present.          1/31/18    Angiogram    Impression:  1. Partially occlusive thrombosis in the first-degree branch of the  superior division of the right middle cerebral artery.  2. Successful mechanical thrombectomy  using stentriever and proximal  flow arrest. TICI-3 recanalization.      Post angio MRI    Findings:   Diffusion weighted images demonstrate linear focus of restricted  diffusion in the right sylvian fissure which correlate with blooming  effect of SWI images at the same location. Overall these findings are  suggestive of minimal blood products in the right sylvian fissure.  FLAIR weighted sequence demonstrates  subtle hyperintense signal along  the right superior temporal sulci likely reflecting minimal blood  products or engorged vessels.   There is no evidence of acute infarction.         Impression:  1. No evidence for acute infarction  2. Findings suggestive of minimal blood products in the right sylvian  fissure.     CTA   Impression:    1. Head CTA demonstrates no aneurysm or stenosis of the major  intracranial arteries.   2. Neck CTA demonstrates no stenosis of the major cervical arteries.   3. No evidence of intracranial hemorrhage on the noncontrast head CT.     July 2016        IMPRESSION:         1. Recent infarct in the central white matter of the right  frontoparietal junction.  2. Old infarcts in the right cerebellar hemisphere.  3. Mild generalized atrophy of the brain.            Past Medical History:   Diagnosis Date     Arthritis     OA left hip, knees, wrists     Asthma with bronchitis      Chronic headaches      GERD (gastroesophageal reflux disease)     omprazole     History of stroke without residual deficits 1/31/2018    headaches     Hypertension      Migraines     On-going, 1 every month or 2     Neck pain     trapezius tightness     Seasonal allergies     mold and grass     Stroke (H) July 18, 2016.    MRI scan that showed recent infarct in the central white matter of the right frontoparietal junction;       Current Outpatient Prescriptions   Medication     lisinopril (PRINIVIL/ZESTRIL) 20 MG tablet     potassium chloride SA (K-DUR/KLOR-CON M) 10 MEQ CR tablet     clopidogrel (PLAVIX) 75  "MG tablet     pantoprazole (PROTONIX) 40 MG EC tablet     atorvastatin (LIPITOR) 20 MG tablet     fluticasone (FLONASE ALLERGY RELIEF) 50 MCG/ACT spray     cetirizine (ZYRTEC) 10 MG CHEW     naproxen sodium (ALEVE) 220 MG capsule     albuterol (PROAIR HFA/PROVENTIL HFA/VENTOLIN HFA) 108 (90 BASE) MCG/ACT Inhaler     DIPHENHYDRAMINE HCL PO     Blood Pressure Monitoring KIT     No current facility-administered medications for this visit.      Social History   Substance Use Topics     Smoking status: Never Smoker     Smokeless tobacco: Never Used     Alcohol use Yes      Comment: One a month    No recreational drugs        EXAM    /81 (BP Location: Right arm, Patient Position: Sitting, Cuff Size: Adult Large)  Pulse 80  Ht 1.562 m (5' 1.5\")  Wt 112.8 kg (248 lb 11.2 oz)  LMP  (LMP Unknown)  BMI 46.23 kg/m2    Orientation: Normal; Language normal; Attention: 93 (1/5); DLROW; normal  Cranial nerves: 2-12 examined normal  Motor: FFM normal bilaterally; No drift; Strength normal in both UE and LE; SA EF EE WE FE; HF KF KE DF EHL  Sensory: no deficits to LT  Co-ordination normal in both UE and LE  Reflexes symmetric  Gait: normal base steady can walk on heels toes and tandem without difficulty      Again, thank you for allowing me to participate in the care of your patient.      Sincerely,    José Miguel Snyder MD      "

## 2018-04-04 LAB
ANA SER QL IF: NEGATIVE
APCR PPP: 2.74 {RATIO}
AT III ACT/NOR PPP CHRO: 98 % (ref 85–135)
B2 GLYCOPROT1 IGG SERPL IA-ACNC: 1.4 U/ML
B2 GLYCOPROT1 IGM SERPL IA-ACNC: 1.2 U/ML
CARDIOLIPIN ANTIBODY IGG: <1.6 GPL-U/ML (ref 0–19.9)
CARDIOLIPIN ANTIBODY IGM: 0.9 MPL-U/ML (ref 0–19.9)
FACT VIII ACT/NOR PPP: 201 % (ref 55–200)
HCYS SERPL-SCNC: 11.2 UMOL/L (ref 4–12)

## 2018-04-05 LAB — LA PPP-IMP: NEGATIVE

## 2018-04-06 LAB
PROT C ACT/NOR PPP CHRO: 115 % (ref 70–170)
PROT S FREE AG ACT/NOR PPP IA: 109 % (ref 55–125)

## 2018-04-07 LAB — FIBRINOGEN AG PPP IA-MCNC: 401 MG/DL (ref 149–353)

## 2018-04-09 ENCOUNTER — HOSPITAL ENCOUNTER (OUTPATIENT)
Dept: CARDIOLOGY | Facility: CLINIC | Age: 58
Discharge: HOME | End: 2018-04-09
Attending: PSYCHIATRY & NEUROLOGY | Admitting: PSYCHIATRY & NEUROLOGY
Payer: COMMERCIAL

## 2018-04-09 VITALS
SYSTOLIC BLOOD PRESSURE: 122 MMHG | RESPIRATION RATE: 16 BRPM | DIASTOLIC BLOOD PRESSURE: 77 MMHG | OXYGEN SATURATION: 95 % | HEART RATE: 78 BPM

## 2018-04-09 DIAGNOSIS — I63.9 ACUTE ISCHEMIC STROKE (H): ICD-10-CM

## 2018-04-09 PROCEDURE — 93312 ECHO TRANSESOPHAGEAL: CPT

## 2018-04-09 PROCEDURE — 25000125 ZZHC RX 250: Performed by: INTERNAL MEDICINE

## 2018-04-09 PROCEDURE — 93325 DOPPLER ECHO COLOR FLOW MAPG: CPT | Mod: 26 | Performed by: INTERNAL MEDICINE

## 2018-04-09 PROCEDURE — 99152 MOD SED SAME PHYS/QHP 5/>YRS: CPT | Performed by: INTERNAL MEDICINE

## 2018-04-09 PROCEDURE — 93320 DOPPLER ECHO COMPLETE: CPT | Mod: 26 | Performed by: INTERNAL MEDICINE

## 2018-04-09 PROCEDURE — 93312 ECHO TRANSESOPHAGEAL: CPT | Mod: 26 | Performed by: INTERNAL MEDICINE

## 2018-04-09 PROCEDURE — 25000128 H RX IP 250 OP 636: Performed by: INTERNAL MEDICINE

## 2018-04-09 PROCEDURE — 99152 MOD SED SAME PHYS/QHP 5/>YRS: CPT

## 2018-04-09 RX ORDER — FLUMAZENIL 0.1 MG/ML
0.2 INJECTION, SOLUTION INTRAVENOUS
Status: DISCONTINUED | OUTPATIENT
Start: 2018-04-09 | End: 2018-04-10 | Stop reason: HOSPADM

## 2018-04-09 RX ORDER — NALOXONE HYDROCHLORIDE 0.4 MG/ML
.1-.4 INJECTION, SOLUTION INTRAMUSCULAR; INTRAVENOUS; SUBCUTANEOUS
Status: DISCONTINUED | OUTPATIENT
Start: 2018-04-09 | End: 2018-04-10 | Stop reason: HOSPADM

## 2018-04-09 RX ORDER — SODIUM CHLORIDE 9 MG/ML
INJECTION, SOLUTION INTRAVENOUS CONTINUOUS PRN
Status: DISCONTINUED | OUTPATIENT
Start: 2018-04-09 | End: 2018-04-10 | Stop reason: HOSPADM

## 2018-04-09 RX ORDER — FENTANYL CITRATE 50 UG/ML
50-100 INJECTION, SOLUTION INTRAMUSCULAR; INTRAVENOUS
Status: DISCONTINUED | OUTPATIENT
Start: 2018-04-09 | End: 2018-04-10 | Stop reason: HOSPADM

## 2018-04-09 RX ORDER — LIDOCAINE 40 MG/G
CREAM TOPICAL
Status: DISCONTINUED | OUTPATIENT
Start: 2018-04-09 | End: 2018-04-10 | Stop reason: HOSPADM

## 2018-04-09 RX ORDER — FENTANYL CITRATE 50 UG/ML
25-50 INJECTION, SOLUTION INTRAMUSCULAR; INTRAVENOUS
Status: DISCONTINUED | OUTPATIENT
Start: 2018-04-09 | End: 2018-04-10 | Stop reason: HOSPADM

## 2018-04-09 RX ADMIN — TOPICAL ANESTHETIC 0.5 ML: 200 SPRAY DENTAL; PERIODONTAL at 08:39

## 2018-04-09 RX ADMIN — LIDOCAINE HYDROCHLORIDE 30 ML: 20 SOLUTION ORAL; TOPICAL at 08:39

## 2018-04-09 RX ADMIN — FENTANYL CITRATE 75 MCG: 50 INJECTION INTRAMUSCULAR; INTRAVENOUS at 08:48

## 2018-04-09 RX ADMIN — MIDAZOLAM 2 MG: 1 INJECTION INTRAMUSCULAR; INTRAVENOUS at 08:49

## 2018-04-09 NOTE — PROGRESS NOTES
Pt here for RUSTY. Procedure was explained to the pt and the consent was signed. Pt was given Fentanyl 75 mcg IV and Versed 2 mg IV for sedation. Pt tolerated the dose without any adverse effects. Pt to remain NPO until 10:40am. Pt denies any pain or discomfort post procedure. Probe #52 was used. Discharge instructions were reviewed and a copy was given to the pt. Pt discharged with .

## 2018-04-11 ENCOUNTER — TELEPHONE (OUTPATIENT)
Dept: NEUROLOGY | Facility: CLINIC | Age: 58
End: 2018-04-11

## 2018-04-11 NOTE — TELEPHONE ENCOUNTER
----- Message from José Miguel Snyder MD sent at 4/9/2018 10:04 AM CDT -----  Mohan Eckert,    There have been a few developments on patient - can you facilitate the following    1. Based on prelim hypercoag results - I have ordered a hemat consult - can you let her know to make this ppt. She does not know    2. I have documented auth for RUSTY - she needs to get this done before expiry on 5/3    3. I have also put in a  Cards consult - can you let her know - she does not know of the consult. This is because of valve narrowing seen on TTE. SHe should see cards after RUSTY     4. I have ordered FV and F II - these are genetic tests - we need her to sign a consent on this - can you fax and get this signed    Thanks much    KL      FW: cancel F2F5MD  Lab Received: Yesterday       José Miguel Snyder MD Elvrum, Kristine, RN                   With next blood draw then            Previous Messages       ----- Message -----      From: Moy Mir      Sent: 4/10/2018  12:26 PM        To: José Miguel Snyder MD   Subject: cancel F2F5MD                                     I have to cancel F2F5MD test because I could not add on the old tube and it need to be drawn.     Thank you   Moy

## 2018-04-11 NOTE — TELEPHONE ENCOUNTER
[facility]    Phone Message    May a detailed message be left on voicemail: yes    Reason for Call: Other: Pt returning Babar's call. She said she was in meetings this morning but is more free this afternoon. Please give her a call back.      Action Taken: Message routed to:  [facility] Neurology

## 2018-04-12 ASSESSMENT — ENCOUNTER SYMPTOMS
FLANK PAIN: 0
BACK PAIN: 0
DIFFICULTY URINATING: 0
HEADACHES: 1
MUSCLE CRAMPS: 0
DIZZINESS: 0
SEIZURES: 0
LOSS OF CONSCIOUSNESS: 0
DYSURIA: 0
TINGLING: 0
DISTURBANCES IN COORDINATION: 0
SPEECH CHANGE: 0
JOINT SWELLING: 0
NECK PAIN: 0
WEAKNESS: 0
MYALGIAS: 0
MUSCLE CRAMPS: 0
MUSCLE WEAKNESS: 0
DIZZINESS: 0
HEMATURIA: 0
NUMBNESS: 0
DYSURIA: 0
SEIZURES: 0
TREMORS: 0
SPEECH CHANGE: 0
MEMORY LOSS: 0
MYALGIAS: 0
BACK PAIN: 0
DIFFICULTY URINATING: 0
MEMORY LOSS: 0
FLANK PAIN: 0
DISTURBANCES IN COORDINATION: 0
PARALYSIS: 0
ARTHRALGIAS: 1
HEADACHES: 1
STIFFNESS: 1
MUSCLE WEAKNESS: 0
TINGLING: 0
TREMORS: 0
LOSS OF CONSCIOUSNESS: 0
WEAKNESS: 0
NUMBNESS: 0
JOINT SWELLING: 0
HEMATURIA: 0
PARALYSIS: 0
ARTHRALGIAS: 1
NECK PAIN: 0
STIFFNESS: 1

## 2018-04-12 NOTE — TELEPHONE ENCOUNTER
[facility]    Phone Message    May a detailed message be left on voicemail: yes    Reason for Call: Other: Pt calling again, hasn't heard anything yet. Returning Babar's call.     Action Taken: Message routed to:  [facility] Neurology

## 2018-04-12 NOTE — TELEPHONE ENCOUNTER
Informed patient of Dr. Snyder's plan. Patient had RUSTY on 4/9/18. She needs appointment with Heme, Cards and lab. Patient prefers genetics consent be emailed to her. She is aware that this is not a secure method of communication. [place] [facility] [place] Message sent to scheduling to contact patient to make above appointments. She will follow-up with Dr. Snyder on 4/24/18 as scheduled. Patient will call with questions/concerns in the interim.

## 2018-04-13 DIAGNOSIS — I63.9 ACUTE ISCHEMIC STROKE (H): ICD-10-CM

## 2018-04-13 DIAGNOSIS — I10 BENIGN ESSENTIAL HYPERTENSION: ICD-10-CM

## 2018-04-13 LAB — POTASSIUM SERPL-SCNC: 3.8 MMOL/L (ref 3.4–5.3)

## 2018-04-14 ENCOUNTER — OFFICE VISIT (OUTPATIENT)
Dept: CARDIOLOGY | Facility: CLINIC | Age: 58
End: 2018-04-14
Attending: INTERNAL MEDICINE
Payer: COMMERCIAL

## 2018-04-14 VITALS
HEART RATE: 94 BPM | DIASTOLIC BLOOD PRESSURE: 74 MMHG | OXYGEN SATURATION: 94 % | HEIGHT: 61 IN | WEIGHT: 244.4 LBS | BODY MASS INDEX: 46.14 KG/M2 | SYSTOLIC BLOOD PRESSURE: 122 MMHG

## 2018-04-14 DIAGNOSIS — R94.31 QT PROLONGATION: Primary | ICD-10-CM

## 2018-04-14 DIAGNOSIS — I47.10 SVT (SUPRAVENTRICULAR TACHYCARDIA) (H): ICD-10-CM

## 2018-04-14 PROCEDURE — 93005 ELECTROCARDIOGRAM TRACING: CPT | Mod: ZF

## 2018-04-14 PROCEDURE — 93010 ELECTROCARDIOGRAM REPORT: CPT | Mod: ZP | Performed by: INTERNAL MEDICINE

## 2018-04-14 PROCEDURE — G0463 HOSPITAL OUTPT CLINIC VISIT: HCPCS

## 2018-04-14 PROCEDURE — 0298T ZZC EXT ECG > 48HR TO 21 DAY REVIEW AND INTERPRETATN: CPT | Performed by: INTERNAL MEDICINE

## 2018-04-14 PROCEDURE — 99204 OFFICE O/P NEW MOD 45 MIN: CPT | Mod: ZP | Performed by: INTERNAL MEDICINE

## 2018-04-14 ASSESSMENT — PAIN SCALES - GENERAL: PAINLEVEL: NO PAIN (0)

## 2018-04-14 NOTE — NURSING NOTE
Chief Complaint   Patient presents with     New Patient     59 y/o female, hx of paroxysmal atrial fibrillation, not on anticoagulation, for cardiology evaluation after new ischemic stroke.     Vitals were taken and medications were reconciled. EKG was performed    Astrid JAMES  8:00 AM

## 2018-04-14 NOTE — PROGRESS NOTES
CARDIOLOGY CONSULTATION    Referring Provider:  José Miguel Snyder  Primary Care Provider:   Lilli Ivan  Indication for Consultation:  Possible cardiac source of CVA    HPI: Natali Myers is a 58 year old female being seen today for cardiac evaluation of recent strokes.   The patient's risk factor profile is: (+) HTN, (-) DM, (-) hypercholesterolemia, (-) tobacco use, (-) fam Hx premature CAD.  The patient has no history of cardiovascular disease (CAD, CHF, arrhythmia, valvular heart disease).  The patient has no Hx of PAD.  The patient underwent an ECHO at the time of her first CVA (2016) which did not reveal cardiac pathophysiology.  She has never had an ECHO, stress study, cardiac catheterization, or EP study.   The patient denies a history of chest discomfort, dyspnea, PND, orthopnea, pedal edema, palpitations, lightheadedness, and syncope.    The patient has had at least 2 symptomatic strokes in the past 2 years. The first stroke was in 2016 where she presented with  left leg numbness and weakness and a brain MRI showed a small infarct close to or touching the central sulcus and extending into the fronto-parietal white matter. Head MRA did not show any explanatory stenosis and neck MRA was significant for stenosis of the origins of the vertebral arteries but otherwise unremarkable. Her second stroke was in Jan 2018 when she presented with L sided weakness. There was extinction, weakness, sensory loss and dysarthria. CTA suggested M2 occlusion and she received IV tPA and also mechanical thrombectomy. Subsequent brain MRI did not show an acute infarct. She had no neurologic deficits.    Dr. Snyder obtained a hypercoagulable work up that was largely normal except for a minimal elevation in FVIII (201 upper limit is 200) and FBG (401 upper limit is 353). Antithrombin III was 98 (wnl).  Activated protein C negative.  Lupus negative.  Protein C chromogenic 115 (wnl).  Protein S 109 (wnl).   FV and FII mutation tests pending at this time.    A cardiac workup was pursued for source of embolism for recent CVA.  A two week Ziopatch showed 2 episodes of SVT, both self limited, and no AF.  She had an ECHO showing presumed rheumatic heart disease with mild mitral stenosis with a (mean gradient 6 mmHg, MVA 1.7 cm^2 at a HR 66).  RUSTY in Apr 2018 showed no source of cardiac embolism.     PAST MEDICAL HISTORY:  Past Medical History:   Diagnosis Date     Arthritis     OA left hip, knees, wrists     Asthma with bronchitis      Chronic headaches      GERD (gastroesophageal reflux disease)     omprazole     History of stroke without residual deficits 1/31/2018    headaches     Hypertension      Migraines     On-going, 1 every month or 2     Neck pain     trapezius tightness     Seasonal allergies     mold and grass     Stroke (H) July 18, 2016.    MRI scan that showed recent infarct in the central white matter of the right frontoparietal junction;         CURRENT MEDICATIONS:  Current Outpatient Prescriptions   Medication Sig Dispense Refill     lisinopril (PRINIVIL/ZESTRIL) 20 MG tablet Take 1 tablet (20 mg) by mouth 2 times daily 180 tablet 1     potassium chloride SA (K-DUR/KLOR-CON M) 10 MEQ CR tablet Take 2 tablets (20 mEq) by mouth daily 180 tablet 1     clopidogrel (PLAVIX) 75 MG tablet Take 1 tablet (75 mg) by mouth daily 30 tablet 11     pantoprazole (PROTONIX) 40 MG EC tablet Take 1 tablet (40 mg) by mouth daily 90 tablet 3     atorvastatin (LIPITOR) 20 MG tablet Take 1 tablet (20 mg) by mouth daily 90 tablet 3     fluticasone (FLONASE ALLERGY RELIEF) 50 MCG/ACT spray Spray 2 sprays into both nostrils daily 1 Bottle 3     cetirizine (ZYRTEC) 10 MG CHEW Take 1 tablet (10 mg) by mouth as needed 30 tablet 3     naproxen sodium (ALEVE) 220 MG capsule        albuterol (PROAIR HFA/PROVENTIL HFA/VENTOLIN HFA) 108 (90 BASE) MCG/ACT Inhaler Inhale 2 puffs into the lungs every 6 hours as needed for shortness of  breath / dyspnea or wheezing 1 Inhaler 1     DIPHENHYDRAMINE HCL PO Take 25 mg by mouth nightly as needed        Blood Pressure Monitoring KIT 1 Device daily Pt to check BP readings daily at home 1 kit 0       PAST SURGICAL HISTORY:  Past Surgical History:   Procedure Laterality Date     DILATION AND CURETTAGE  5/24/2014    Procedure: DILATION AND CURETTAGE;  Surgeon: Elyse Mcconnell MD;  Location: [facility] OR     HYSTERECTOMY TOTAL ABDOMINAL  6/19/2014    Procedure: HYSTERECTOMY TOTAL ABDOMINAL;  Surgeon: Elyse Mcconnell MD;  Location: [facility] OR     KNEE SURGERY  age 23     cartilage/ incision both knees     RELEASE CARPAL TUNNEL BILATERAL  1996    [place]     SALPINGO-OOPHORECTOMY BILATERAL  6/19/2014    Procedure: SALPINGO-OOPHORECTOMY BILATERAL;  Surgeon: Elyse Mcconnell MD;  Location: [facility] OR     WRIST SURGERY         ALLERGIES  Mold; Seasonal allergies; Codeine; Latex; No clinical screening - see comments; and Penicillins    FAMILY HX:  Family History   Problem Relation Age of Onset     DIABETES Mother      Blood Disease Mother      KIDNEY DISEASE Mother      CANCER Mother      Cardiovascular Father      Alcohol/Drug Father      CEREBROVASCULAR DISEASE Father      ,     Dementia Father      DIABETES Maternal Grandmother      Thyroid Disease Maternal Grandmother      CANCER Paternal Grandmother      eye      Dementia Paternal Grandmother      Asthma Brother        SOCIAL HX:  Social History     Social History     Marital status:      Spouse name: N/A     Number of children: N/A     Years of education: N/A     Social History Main Topics     Smoking status: Never Smoker     Smokeless tobacco: Never Used     Alcohol use Yes      Comment: One a month      Drug use: No     Sexual activity: No      Comment: Currently not sexually active      Other Topics Concern     Not on file     Social History Narrative    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    , mother of 4 children; 4 grandchildren       ROS:  Answers for HPI/ROS  "submitted by the patient on 4/12/2018   General Symptoms: No  Skin Symptoms: No  HENT Symptoms: No  EYE SYMPTOMS: No  HEART SYMPTOMS: No  LUNG SYMPTOMS: No  INTESTINAL SYMPTOMS: No  URINARY SYMPTOMS: Yes  GYNECOLOGIC SYMPTOMS: No  BREAST SYMPTOMS: No  SKELETAL SYMPTOMS: Yes  BLOOD SYMPTOMS: No  NERVOUS SYSTEM SYMPTOMS: Yes  MENTAL HEALTH SYMPTOMS: No  Trouble holding urine or incontinence: No  Pain or burning: No  Trouble starting or stopping: No  Increased frequency of urination: No  Blood in urine: No  Decreased frequency of urination: No  Frequent nighttime urination: No  Flank pain: No  Difficulty emptying bladder: No  Back pain: No  Muscle aches: No  Neck pain: No  Swollen joints: No  Joint pain: Yes  Bone pain: No  Muscle cramps: No  Muscle weakness: No  Joint stiffness: Yes  Bone fracture: No  Trouble with coordination: No  Dizziness or trouble with balance: No  Fainting or black-out spells: No  Memory loss: No  Headache: Yes  Seizures: No  Speech problems: No  Tingling: No  Tremor: No  Weakness: No  Difficulty walking: No  Paralysis: No  Numbness: No      VITAL SIGNS:  /74  Pulse 94  Ht 1.549 m (5' 1\")  Wt 110.9 kg (244 lb 6.4 oz)  LMP  (LMP Unknown)  SpO2 94%  BMI 46.18 kg/m2  Body mass index is 46.18 kg/(m^2).  Wt Readings from Last 2 Encounters:   04/03/18 112.8 kg (248 lb 11.2 oz)   03/07/18 111.7 kg (246 lb 3.2 oz)       PHYSICAL EXAM  Natali Myers is a 58 year old female.in no acute distress.  HEENT: Eyes Nonicteric.  Neck: JVP normal.  Carotids +3/3 bilaterally without bruits.  Lungs: CTA.  Cor: RRR. Normal S1 and S2.  No murmur, rub, or gallop. Unable to appreciate opening snap. PMI in Lf 5th ICS.  Abd: Soft, nontender, nondistended.  NABS.  No pulsatile mass.  Extremities: No C/C/E.  Pulses +3/3 symmetric in upper and lower extremities.  Neuro: Grossly intact.  Psych: A&O x 3.  Skin: No rash.    LABS  Recent Labs   Lab Test  03/07/18   1158  02/02/18   0903   WBC  5.9  6.1   HGB  " 15.2  13.6   HCT  43.8  39.6   PLT  210  169     Recent Labs   Lab Test  18   1538  18   0759   18   1158   18   1727   NA   --    --    --   138   --   140   POTASSIUM  3.8  4.1   < >  3.9   < >  3.3*   CHLORIDE   --    --    --   105   --   105   CO2   --    --    --   23   --   28   GLC   --    --    --   143*   --   93   BUN   --    --    --   12   --   9   CR   --    --    --   0.86   --   0.88   ADEN   --    --    --   8.8   --   9.0    < > = values in this interval not displayed.     Recent Labs   Lab Test  18   0508  17   0727   CHOL  100  107   HDL  47*  56   LDL  35  23   TRIG  91  142   NHDL  53  52        EK18  NSR with sinus arrhythmia, rate 77.  SD 0.18  QRS 0.076  QTc 0.43  No ischemia, injury, infarction.    EK18  NSR.  SD 0.20  QRS 0.08  QTc 0.50  No ischemia, injury, infarction.    ZIOPATCH:  2018  NSR.  Two SVT episodes, one of which may have been atrial tachycardia.    ECHO: 18  Global and regional left ventricular function is normal with an EF of 60-65%.  Grade 1 Diastolic dysfunction.  Right ventricular function, chamber size, wall motion, and thickness are normal.  There is mild mitral stenosis with a mean gradient of 6 mmHg and a mitral valve area of 1.7 cm^2 at a HR of 66 bpm.   Mechanism appears rheumatic.  Other valves without significant dysfunction. Trace AI. Trace TR.  The inferior vena cava was normal in size with preserved respiratory variability.   Estimated mean right atrial pressure is 3 mmHg.  No pericardial effusion is present.  This study was compared with the study from 2016, mild mitral stenosis is now present.    ECHO: 16  No significant valvular abnormalities were noted. Technically difficult study.  Left ventricular function, chamber size, wall motion, and wall thickness are  Normal.  The EF is 60-65%.  Right ventricular function, chamber size, wall motion, and thickness are normal.  The atrial septum is  intact as assessed by agitated saline bubble study.    RUSTY:   4/9/18  No cardiac source for embolus identified. The atrial septum is intact as assessed by color Doppler and agitated dextrose bubble study .    STRESS TEST:  None    CARDIAC CATH: None    ASSESSMENT AND PLAN:   1. R/O Atrial Fibrillation.  Ms. 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  There is no clear evidence of atrial fibrillation (including negative Ziopatch 2 weeks long) but in the setting of mitral stenosis (albeit mild), it is high on the differential.  There was no source of embolism on the RUSTY.  I would like to repeat the Ziopatch to see if we can find AF as this would change the therapy from Plavix monotherapy to coumadin or possibly a NOAC.    2. Mitral Stenosis, Mild.  Asymptomatic.  I would repeat ECHO in 2 years to reassess.  If she should develop cardiopulmonary symptoms, I would repeat sooner.    3. QT Prolongation.  She had QT prolongation on prior ECG from earlier this year with QTc 0.50.  Her medications have subsequently been changed and this may have been related to hospital medication at time of CVA.  QTc now corrected.     4. BP Management.  Patient BP well controlled on Lisinopril 20 mg BID.  PCP checking renin angiotensin axis for assessment of hypokalemia.    FOLLOW UP:  Dr. Pretty, 2 years, with ECHO.  If Ziopatch shows AF, 4 weeks with discussion of anticoagulation.    ZioPatch Monitor (5/18/18): NSR.  9 episodes of SVT.  No definitive AF.     Melecio Pretty MD    Divisions of Cardiology  [place], [place]    CC  Patient Care Team:  Lilli Ivan MD as PCP - General (Internal Medicine)  Emily Chan RN as Nurse Coordinator (Neurology)  José Miguel Snyder MD as MD (Neurology)  Melecio Pretty MD as MD (Internal Medicine - Interventional  Cardiology)  [facility]

## 2018-04-14 NOTE — MR AVS SNAPSHOT
After Visit Summary   4/14/2018    Natali Myers    MRN: [id number]           Patient Information     Date Of Birth          1960        Visit Information        Provider Department      4/14/2018 8:00 AM Melecio Pretty MD [facility]        Today's Diagnoses     QT prolongation    -  1    SVT (supraventricular tachycardia) (H)          Care Instructions      It was a pleasure to see you in the cardiology clinic today.    If you have any questions, you can reach my nurse, Radha Saenz, at [phone].  Press Option #1 for the [facility], and then press Option #3 for nursing.    Note the new medications: None  Stop the following medications: None    The results from today include: None    Tests ordered today: ZIOPATCH    I would like you to follow up with Dr. Pretty in 4 weeks if the Ziopatch shows atrial fibrillation.    Plan on repeat ECHO in 2 years.    Sincerely,      Melecio Pretty MD     [facility]        Heart Valve Problems: Mitral Valve Stenosis  Mitral stenosis means the mitral valve stiffens and doesn t open all the way. Blood must move through a smaller opening. In severe cases, fluid can build up in the lungs, leading to coughing and breathing problems. Problems with the mitral valve can also cause a fast or irregular heartbeat (palpitations). Over time mitral stenosis may slowly get worse.  Possible causes  Most cases of mitral stenosis are caused by rheumatic fever. This illness can lead to an inflammation that damages the heart valves. Although pregnancy doesn t cause mitral stenosis, a woman may first develop symptoms of mitral stenosis during pregnancy. This is because the amount of blood her heart has to move has increased.     Open mitral valve with stenosis (viewed from above).      Treating mitral valve stenosis  If you don't have symptoms, you usually will not need treatment. If symptoms occur,  your doctor may prescribe medicines to help ease them. If the stenosis is severe, surgery can be done to repair or replace the valve.  Date Last Reviewed: 6/1/2016 2000-2017 The [employer]. [address] [place] [address], [place] [address]. All rights reserved. This information is not intended as a substitute for professional medical care. Always follow your healthcare professional's instructions.        Understanding Atrial Fibrillation    An arrhythmia is any problem with the speed or pattern of the heartbeat. Atrial fibrillation (AFib) is a common type of arrhythmia. It causes fast, chaotic electrical signals in the atria. This leads to poor functioning of the heart. It also affects how much blood your heart can pump out to the body.  Afib may occur once in a while and go away on its own. Or it may continue for longer periods and need treatment.  AFib can lead to serious problems, such as stroke. Your healthcare provider will need to monitor and manage it.  What happens during atrial fibrillation?   The heart has an electrical system that sends signals to control the heartbeat. As signals move through the heart, they tell the heart s upper chambers (atria) and lower chambers (ventricles) when to squeeze (contract) and relax. This lets blood move through the heart and out to the body and lungs.  With AFib, the atria receive abnormal signals. This causes them to contract in a fast and irregular way, and out of sync with the ventricles. When this happens, the atria also have a harder time moving blood into the ventricles. Blood may then pool in the atria, which increases the risk for blood clots and stroke. The ventricles also may contract too quickly and irregularly. As a result, they may not pump blood to the body and lungs as well as they should. This can weaken the heart muscle over time and cause heart failure.  What causes atrial fibrillation?  AFib is more common in older adults. It has many possible  causes including:    Coronary artery disease    Heart valve disease    Heart attack    Heart surgery    High blood pressure    Thyroid disease    Diabetes    Lung disease    Sleep apnea    Heavy alcohol use  In some cases of AFib, doctors do not know the cause.  What are the symptoms of atrial fibrillation?  AFib may or may not cause symptoms. If symptoms do occur, they may include:    A fast, pounding, irregular heartbeat    Shortness of breath    Tiredness    Dizziness or fainting    Chest pain  How is atrial fibrillation treated?  Treatments for AFib can include any of the options below.    Medicines. You may be prescribed:    Heart rate medicines to help slow down the heartbeat    Heart rhythm medicines to help the heart beat more regularly    Anti-clotting medicines to help reduce the risk for blood clots and stroke    Electrical cardioversion. Your healthcare provider uses special pads or paddles to send one or more brief electrical shocks to the heart. This can help reset the heartbeat to normal.    Ablation. Long, thin tubes called catheters are threaded through a blood vessel to the heart. There, the catheters send out hot or cold energy to the areas causing the abnormal signals. This energy destroys the problem tissue or cells. This improves the chances that your heart will stay in normal rhythm without using medicines. If your heart rate and rhythm can t be controlled, you may need ablation and a pacemaker. These will help control the heart rate and regularity of the heartbeat.    Surgery. During surgery, your healthcare provider may use different methods to create scar tissue in the areas of the heart causing the abnormal signals. The scar tissue disrupts the abnormal signals and may stop AFib from occurring.  What are the complications of atrial fibrillation?  These can include:    Blood clots    Stroke    Heart failure. This problem occurs when the heart muscle weakens so much that it can no longer  pump blood well.  When should I call my healthcare provider?  Call your healthcare provider right away if you have any of these:    Symptoms that don t get better with treatment, or get worse    New symptoms   Date Last Reviewed: 5/1/2016 2000-2017 [employer]. [address] [place] [address]. All rights reserved. This information is not intended as a substitute for professional medical care. Always follow your healthcare professional's instructions.                Follow-ups after your visit        Your next 10 appointments already scheduled     Apr 24, 2018 11:50 AM CDT   (Arrive by 11:35 AM)   Return Stroke with José Miguel Snyder MD   [facility] ([facility])    [address]  3rd Floor  [facility] [id number] [phone]            Apr 24, 2018  2:00 PM CDT   NEW CLOTTING DISORDER with Frances Nicole MD   [facility] ([facility])    [facility]  [address]  [facility] [place] [facility] [id number] [phone]            Aug 29, 2018 11:30 AM CDT   Lab with  LAB   [facility] ([facility])    [address]  1st Floor  [facility] [id number] [phone]            Aug 29, 2018 12:30 PM CDT   (Arrive by 12:00 PM)   Return Visit with Arnav Mccurdy MD   [facility] ([facility])    [address]  [address]  [place] [id number] [phone]              Future tests that were ordered for you today     Open Future Orders        Priority Expected Expires Ordered    Ziopatch Holter Monitor - Adult Routine  6/13/2018 4/14/2018            Who to contact     If you have questions or need follow up information about today's clinic visit or your schedule please contact [facility] directly at [phone].  Normal or non-critical lab and imaging results will be communicated to you by  "MyChart, letter or phone within 4 business days after the clinic has received the results. If you do not hear from us within 7 days, please contact the clinic through [employer] or phone. If you have a critical or abnormal lab result, we will notify you by phone as soon as possible.  Submit refill requests through [email or web address] or call your pharmacy and they will forward the refill request to us. Please allow 3 business days for your refill to be completed.          Additional Information About Your Visit        [employer] Information     [email or web address] gives you secure access to your electronic health record. If you see a primary care provider, you can also send messages to your care team and make appointments. If you have questions, please call your primary care clinic.  If you do not have a primary care provider, please call [phone] and they will assist you.        Care EveryWhere ID     This is your Care EveryWhere ID. This could be used by other organizations to access your [place] medical records  [id number]        Your Vitals Were     Pulse Height Last Period Pulse Oximetry BMI (Body Mass Index)       94 1.549 m (5' 1\") (LMP Unknown) 94% 46.18 kg/m2        Blood Pressure from Last 3 Encounters:   04/14/18 122/74   04/09/18 122/77   04/03/18 123/81    Weight from Last 3 Encounters:   04/14/18 110.9 kg (244 lb 6.4 oz)   04/03/18 112.8 kg (248 lb 11.2 oz)   03/07/18 111.7 kg (246 lb 3.2 oz)              We Performed the Following     EKG 12-lead, tracing only (Same Day)        Primary Care Provider Office Phone # Fax #    Lilli Ivan [phone] [address] [address]        Equal Access to Services     [facility] : Hadii luther Booth, waaxda luqadaha, qaybta kaalmada tara, nima sargent. So [facility] [phone].    ATENCIÓN: Si habla español, tiene a ojeda disposición servicios gratuitos de asistencia lingüística. Llame al " [phone].    We comply with applicable federal civil rights laws and [place] laws. We do not discriminate on the basis of race, color, national origin, age, disability, sex, sexual orientation, or gender identity.            Thank you!     Thank you for choosing [facility]  for your care. Our goal is always to provide you with excellent care. Hearing back from our patients is one way we can continue to improve our services. Please take a few minutes to complete the written survey that you may receive in the mail after your visit with us. Thank you!             Your Updated Medication List - Protect others around you: Learn how to safely use, store and throw away your medicines at www.disposemymeds.org.          This list is accurate as of 4/14/18  8:45 AM.  Always use your most recent med list.                   Brand Name Dispense Instructions for use Diagnosis    albuterol 108 (90 Base) MCG/ACT Inhaler    PROAIR HFA/PROVENTIL HFA/VENTOLIN HFA    1 Inhaler    Inhale 2 puffs into the lungs every 6 hours as needed for shortness of breath / dyspnea or wheezing    Mild intermittent asthma without complication       ALEVE 220 MG capsule   Generic drug:  naproxen sodium           atorvastatin 20 MG tablet    LIPITOR    90 tablet    Take 1 tablet (20 mg) by mouth daily    Cerebrovascular accident (CVA), unspecified mechanism (H)       Blood Pressure Monitoring Kit     1 kit    1 Device daily Pt to check BP readings daily at home    BMI 40.0-44.9, adult (H)       cetirizine 10 MG Chew    zyrTEC    30 tablet    Take 1 tablet (10 mg) by mouth as needed    Chronic allergic rhinitis, unspecified seasonality, unspecified trigger       clopidogrel 75 MG tablet    PLAVIX    30 tablet    Take 1 tablet (75 mg) by mouth daily    Acute CVA (cerebrovascular accident) (H)       DIPHENHYDRAMINE HCL PO      Take 25 mg by mouth nightly as needed        fluticasone 50 MCG/ACT spray    FLONASE ALLERGY RELIEF    1 Bottle     Spray 2 sprays into both nostrils daily    Chronic allergic rhinitis, unspecified seasonality, unspecified trigger       lisinopril 20 MG tablet    PRINIVIL/ZESTRIL    180 tablet    Take 1 tablet (20 mg) by mouth 2 times daily    Benign essential hypertension       pantoprazole 40 MG EC tablet    PROTONIX    90 tablet    Take 1 tablet (40 mg) by mouth daily    Gastroesophageal reflux disease without esophagitis       potassium chloride SA 10 MEQ CR tablet    K-DUR/KLOR-CON M    180 tablet    Take 2 tablets (20 mEq) by mouth daily    Hypokalemia

## 2018-04-14 NOTE — PATIENT INSTRUCTIONS
It was a pleasure to see you in the cardiology clinic today.    If you have any questions, you can reach my nurse, Radhaguille Wintermu, at [phone].  Press Option #1 for the [facility], and then press Option #3 for nursing.    Note the new medications: None  Stop the following medications: None    The results from today include: None    Tests ordered today: ZIOPATCH    I would like you to follow up with Dr. Pretty in 4 weeks if the Ziopatch shows atrial fibrillation.    Plan on repeat ECHO in 2 years.    Sincerely,      Melecio Pretty MD     [facility]        Heart Valve Problems: Mitral Valve Stenosis  Mitral stenosis means the mitral valve stiffens and doesn t open all the way. Blood must move through a smaller opening. In severe cases, fluid can build up in the lungs, leading to coughing and breathing problems. Problems with the mitral valve can also cause a fast or irregular heartbeat (palpitations). Over time mitral stenosis may slowly get worse.  Possible causes  Most cases of mitral stenosis are caused by rheumatic fever. This illness can lead to an inflammation that damages the heart valves. Although pregnancy doesn t cause mitral stenosis, a woman may first develop symptoms of mitral stenosis during pregnancy. This is because the amount of blood her heart has to move has increased.     Open mitral valve with stenosis (viewed from above).      Treating mitral valve stenosis  If you don't have symptoms, you usually will not need treatment. If symptoms occur, your doctor may prescribe medicines to help ease them. If the stenosis is severe, surgery can be done to repair or replace the valve.  Date Last Reviewed: 6/1/2016 2000-2017 [employer]. [address] [place] [address], [place] [address]. All rights reserved. This information is not intended as a substitute for professional medical care. Always follow your healthcare professional's  instructions.        Understanding Atrial Fibrillation    An arrhythmia is any problem with the speed or pattern of the heartbeat. Atrial fibrillation (AFib) is a common type of arrhythmia. It causes fast, chaotic electrical signals in the atria. This leads to poor functioning of the heart. It also affects how much blood your heart can pump out to the body.  Afib may occur once in a while and go away on its own. Or it may continue for longer periods and need treatment.  AFib can lead to serious problems, such as stroke. Your healthcare provider will need to monitor and manage it.  What happens during atrial fibrillation?   The heart has an electrical system that sends signals to control the heartbeat. As signals move through the heart, they tell the heart s upper chambers (atria) and lower chambers (ventricles) when to squeeze (contract) and relax. This lets blood move through the heart and out to the body and lungs.  With AFib, the atria receive abnormal signals. This causes them to contract in a fast and irregular way, and out of sync with the ventricles. When this happens, the atria also have a harder time moving blood into the ventricles. Blood may then pool in the atria, which increases the risk for blood clots and stroke. The ventricles also may contract too quickly and irregularly. As a result, they may not pump blood to the body and lungs as well as they should. This can weaken the heart muscle over time and cause heart failure.  What causes atrial fibrillation?  AFib is more common in older adults. It has many possible causes including:    Coronary artery disease    Heart valve disease    Heart attack    Heart surgery    High blood pressure    Thyroid disease    Diabetes    Lung disease    Sleep apnea    Heavy alcohol use  In some cases of AFib, doctors do not know the cause.  What are the symptoms of atrial fibrillation?  AFib may or may not cause symptoms. If symptoms do occur, they may include:    A  fast, pounding, irregular heartbeat    Shortness of breath    Tiredness    Dizziness or fainting    Chest pain  How is atrial fibrillation treated?  Treatments for AFib can include any of the options below.    Medicines. You may be prescribed:    Heart rate medicines to help slow down the heartbeat    Heart rhythm medicines to help the heart beat more regularly    Anti-clotting medicines to help reduce the risk for blood clots and stroke    Electrical cardioversion. Your healthcare provider uses special pads or paddles to send one or more brief electrical shocks to the heart. This can help reset the heartbeat to normal.    Ablation. Long, thin tubes called catheters are threaded through a blood vessel to the heart. There, the catheters send out hot or cold energy to the areas causing the abnormal signals. This energy destroys the problem tissue or cells. This improves the chances that your heart will stay in normal rhythm without using medicines. If your heart rate and rhythm can t be controlled, you may need ablation and a pacemaker. These will help control the heart rate and regularity of the heartbeat.    Surgery. During surgery, your healthcare provider may use different methods to create scar tissue in the areas of the heart causing the abnormal signals. The scar tissue disrupts the abnormal signals and may stop AFib from occurring.  What are the complications of atrial fibrillation?  These can include:    Blood clots    Stroke    Heart failure. This problem occurs when the heart muscle weakens so much that it can no longer pump blood well.  When should I call my healthcare provider?  Call your healthcare provider right away if you have any of these:    Symptoms that don t get better with treatment, or get worse    New symptoms   Date Last Reviewed: 5/1/2016 2000-2017 [employer]. [address] [place] [address], [place] [address]. All rights reserved. This information is not intended as a substitute  for professional medical care. Always follow your healthcare professional's instructions.

## 2018-04-14 NOTE — LETTER
4/14/2018      RE: Natali Myers  [address]  [facility] [address]       Dear Colleague,    Thank you for the opportunity to participate in the care of your patient, Natali Myers, at the [facility] at [facility]. Please see a copy of my visit note below.    CARDIOLOGY CONSULTATION    Referring Provider:  José Miguel Snyder  Primary Care Provider:   Lilli Ivan  Indication for Consultation:  Possible cardiac source of CVA    HPI: Natali Myers is a 58 year old female being seen today for cardiac evaluation of recent strokes.   The patient's risk factor profile is: (+) HTN, (-) DM, (-) hypercholesterolemia, (-) tobacco use, (-) fam Hx premature CAD.  The patient has no history of cardiovascular disease (CAD, CHF, arrhythmia, valvular heart disease).  The patient has no Hx of PAD.  The patient underwent an ECHO at the time of her first CVA (2016) which did not reveal cardiac pathophysiology.  She has never had an ECHO, stress study, cardiac catheterization, or EP study.   The patient denies a history of chest discomfort, dyspnea, PND, orthopnea, pedal edema, palpitations, lightheadedness, and syncope.    The patient has had at least 2 symptomatic strokes in the past 2 years. The first stroke was in 2016 where she presented with  left leg numbness and weakness and a brain MRI showed a small infarct close to or touching the central sulcus and extending into the fronto-parietal white matter. Head MRA did not show any explanatory stenosis and neck MRA was significant for stenosis of the origins of the vertebral arteries but otherwise unremarkable. Her second stroke was in Jan 2018 when she presented with L sided weakness. There was extinction, weakness, sensory loss and dysarthria. CTA suggested M2 occlusion and she received IV tPA and also mechanical thrombectomy. Subsequent brain MRI did not show an acute infarct. She had no neurologic  deficits.    Dr. Snyder obtained a hypercoagulable work up that was largely normal except for a minimal elevation in FVIII (201 upper limit is 200) and FBG (401 upper limit is 353). Antithrombin III was 98 (wnl).  Activated protein C negative.  Lupus negative.  Protein C chromogenic 115 (wnl).  Protein S 109 (wnl).  FV and FII mutation tests pending at this time.    A cardiac workup was pursued for source of embolism for recent CVA.  A two week Ziopatch showed 2 episodes of SVT, both self limited, and no AF.  She had an ECHO showing presumed rheumatic heart disease with mild mitral stenosis with a (mean gradient 6 mmHg, MVA 1.7 cm^2 at a HR 66).  RUSTY in Apr 2018 showed no source of cardiac embolism.     PAST MEDICAL HISTORY:  Past Medical History:   Diagnosis Date     Arthritis     OA left hip, knees, wrists     Asthma with bronchitis      Chronic headaches      GERD (gastroesophageal reflux disease)     omprazole     History of stroke without residual deficits 1/31/2018    headaches     Hypertension      Migraines     On-going, 1 every month or 2     Neck pain     trapezius tightness     Seasonal allergies     mold and grass     Stroke (H) July 18, 2016.    MRI scan that showed recent infarct in the central white matter of the right frontoparietal junction;         CURRENT MEDICATIONS:  Current Outpatient Prescriptions   Medication Sig Dispense Refill     lisinopril (PRINIVIL/ZESTRIL) 20 MG tablet Take 1 tablet (20 mg) by mouth 2 times daily 180 tablet 1     potassium chloride SA (K-DUR/KLOR-CON M) 10 MEQ CR tablet Take 2 tablets (20 mEq) by mouth daily 180 tablet 1     clopidogrel (PLAVIX) 75 MG tablet Take 1 tablet (75 mg) by mouth daily 30 tablet 11     pantoprazole (PROTONIX) 40 MG EC tablet Take 1 tablet (40 mg) by mouth daily 90 tablet 3     atorvastatin (LIPITOR) 20 MG tablet Take 1 tablet (20 mg) by mouth daily 90 tablet 3     fluticasone (FLONASE ALLERGY RELIEF) 50 MCG/ACT spray Spray 2 sprays into  both nostrils daily 1 Bottle 3     cetirizine (ZYRTEC) 10 MG CHEW Take 1 tablet (10 mg) by mouth as needed 30 tablet 3     naproxen sodium (ALEVE) 220 MG capsule        albuterol (PROAIR HFA/PROVENTIL HFA/VENTOLIN HFA) 108 (90 BASE) MCG/ACT Inhaler Inhale 2 puffs into the lungs every 6 hours as needed for shortness of breath / dyspnea or wheezing 1 Inhaler 1     DIPHENHYDRAMINE HCL PO Take 25 mg by mouth nightly as needed        Blood Pressure Monitoring KIT 1 Device daily Pt to check BP readings daily at home 1 kit 0       PAST SURGICAL HISTORY:  Past Surgical History:   Procedure Laterality Date     DILATION AND CURETTAGE  5/24/2014    Procedure: DILATION AND CURETTAGE;  Surgeon: Elyse Mcconnell MD;  Location: [facility] OR     HYSTERECTOMY TOTAL ABDOMINAL  6/19/2014    Procedure: HYSTERECTOMY TOTAL ABDOMINAL;  Surgeon: Elyse Mcconnell MD;  Location: [facility] OR     KNEE SURGERY  age 23     cartilage/ incision both knees     RELEASE CARPAL TUNNEL BILATERAL  1996    [place]     SALPINGO-OOPHORECTOMY BILATERAL  6/19/2014    Procedure: SALPINGO-OOPHORECTOMY BILATERAL;  Surgeon: Elyse Mcconnell MD;  Location: [facility] OR     WRIST SURGERY         ALLERGIES  Mold; Seasonal allergies; Codeine; Latex; No clinical screening - see comments; and Penicillins    FAMILY HX:  Family History   Problem Relation Age of Onset     DIABETES Mother      Blood Disease Mother      KIDNEY DISEASE Mother      CANCER Mother      Cardiovascular Father      Alcohol/Drug Father      CEREBROVASCULAR DISEASE Father      ,     Dementia Father      DIABETES Maternal Grandmother      Thyroid Disease Maternal Grandmother      CANCER Paternal Grandmother      eye      Dementia Paternal Grandmother      Asthma Brother        SOCIAL HX:  Social History     Social History     Marital status:      Spouse name: N/A     Number of children: N/A     Years of education: N/A     Social History Main Topics     Smoking status: Never Smoker     Smokeless tobacco: Never Used  "    Alcohol use Yes      Comment: One a month      Drug use: No     Sexual activity: No      Comment: Currently not sexually active      Other Topics Concern     Not on file     Social History Narrative    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    , mother of 4 children; 4 grandchildren       ROS:      VITAL SIGNS:  /74  Pulse 94  Ht 1.549 m (5' 1\")  Wt 110.9 kg (244 lb 6.4 oz)  LMP  (LMP Unknown)  SpO2 94%  BMI 46.18 kg/m2  Body mass index is 46.18 kg/(m^2).  Wt Readings from Last 2 Encounters:   18 112.8 kg (248 lb 11.2 oz)   18 111.7 kg (246 lb 3.2 oz)       PHYSICAL EXAM  Natali Myers is a 58 year old female.in no acute distress.  HEENT: Eyes Nonicteric.  Neck: JVP normal.  Carotids +3/3 bilaterally without bruits.  Lungs: CTA.  Cor: RRR. Normal S1 and S2.  No murmur, rub, or gallop. Unable to appreciate opening snap. PMI in Lf 5th ICS.  Abd: Soft, nontender, nondistended.  NABS.  No pulsatile mass.  Extremities: No C/C/E.  Pulses +3/3 symmetric in upper and lower extremities.  Neuro: Grossly intact.  Psych: A&O x 3.  Skin: No rash.    LABS  Recent Labs   Lab Test  18   1158  18   0903   WBC  5.9  6.1   HGB  15.2  13.6   HCT  43.8  39.6   PLT  210  169     Recent Labs   Lab Test  18   1538  18   0759   18   1158   18   1727   NA   --    --    --   138   --   140   POTASSIUM  3.8  4.1   < >  3.9   < >  3.3*   CHLORIDE   --    --    --   105   --   105   CO2   --    --    --   23   --   28   GLC   --    --    --   143*   --   93   BUN   --    --    --   12   --   9   CR   --    --    --   0.86   --   0.88   ADEN   --    --    --   8.8   --   9.0    < > = values in this interval not displayed.     Recent Labs   Lab Test  18   0508  17   0727   CHOL  100  107   HDL  47*  56   LDL  35  23   TRIG  91  142   NHDL  53  52        EK18  NSR with sinus arrhythmia, rate 77.  AZ 0.18  QRS 0.076  QTc 0.43  No ischemia, injury, infarction.    EKG:  " 1/31/18  NSR.  SD 0.20  QRS 0.08  QTc 0.50  No ischemia, injury, infarction.    ZIOPATCH:  Feb 2018  NSR.  Two SVT episodes, one of which may have been atrial tachycardia.    ECHO: 2/1/18  Global and regional left ventricular function is normal with an EF of 60-65%.  Grade 1 Diastolic dysfunction.  Right ventricular function, chamber size, wall motion, and thickness are normal.  There is mild mitral stenosis with a mean gradient of 6 mmHg and a mitral valve area of 1.7 cm^2 at a HR of 66 bpm.   Mechanism appears rheumatic.  Other valves without significant dysfunction. Trace AI. Trace TR.  The inferior vena cava was normal in size with preserved respiratory variability.   Estimated mean right atrial pressure is 3 mmHg.  No pericardial effusion is present.  This study was compared with the study from 7/2016, mild mitral stenosis is now present.    ECHO: 7/20/16  No significant valvular abnormalities were noted. Technically difficult study.  Left ventricular function, chamber size, wall motion, and wall thickness are  Normal.  The EF is 60-65%.  Right ventricular function, chamber size, wall motion, and thickness are normal.  The atrial septum is intact as assessed by agitated saline bubble study.    RUSTY:   4/9/18  No cardiac source for embolus identified. The atrial septum is intact as assessed by color Doppler and agitated dextrose bubble study .    STRESS TEST:  None    CARDIAC CATH: None    ASSESSMENT AND PLAN:   1. R/O Atrial Fibrillation.  Ms. 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  There is no clear evidence of atrial fibrillation (including negative Ziopatch 2 weeks long) but in the setting of mitral stenosis (albeit mild), it is high on the differential.  There was no source of embolism on the RUSTY.  I would like to repeat the Ziopatch to see if we can find AF as this would change the therapy from  Plavix monotherapy to coumadin or possibly a NOAC.    2. Mitral Stenosis, Mild.  Asymptomatic.  I would repeat ECHO in 2 years to reassess.  If she should develop cardiopulmonary symptoms, I would repeat sooner.    3. QT Prolongation.  She had QT prolongation on prior ECG from earlier this year with QTc 0.50.  Her medications have subsequently been changed and this may have been related to hospital medication at time of CVA.  QTc now corrected.     4. BP Management.  Patient BP well controlled on Lisinopril 20 mg BID.  PCP checking renin angiotensin axis for assessment of hypokalemia.    FOLLOW UP:  Dr. Pretty, 2 years, with ECHO.  If Ziopatch shows AF, 4 weeks with discussion of anticoagulation.      Melecio Pretty MD    Divisions of Cardiology  [facility], [place]    CC  Patient Care Team:  Lilli Ivan MD as PCP - General (Internal Medicine)  Emily Chan RN as Nurse Coordinator (Neurology)  José Miguel Snyder MD as MD (Neurology)  Melecio Pretty MD as MD (Internal Medicine - Interventional Cardiology)  JOSÉ MIGUEL SNYDER

## 2018-04-16 LAB — INTERPRETATION ECG - MUSE: NORMAL

## 2018-04-19 LAB — COPATH REPORT: NORMAL

## 2018-04-24 ENCOUNTER — OFFICE VISIT (OUTPATIENT)
Dept: HEMATOLOGY | Facility: CLINIC | Age: 58
End: 2018-04-24
Attending: INTERNAL MEDICINE
Payer: COMMERCIAL

## 2018-04-24 ENCOUNTER — OFFICE VISIT (OUTPATIENT)
Dept: NEUROLOGY | Facility: CLINIC | Age: 58
End: 2018-04-24
Payer: COMMERCIAL

## 2018-04-24 VITALS
TEMPERATURE: 97.6 F | WEIGHT: 242 LBS | HEART RATE: 92 BPM | RESPIRATION RATE: 12 BRPM | DIASTOLIC BLOOD PRESSURE: 80 MMHG | HEIGHT: 62 IN | OXYGEN SATURATION: 100 % | SYSTOLIC BLOOD PRESSURE: 122 MMHG | BODY MASS INDEX: 44.53 KG/M2

## 2018-04-24 VITALS
SYSTOLIC BLOOD PRESSURE: 128 MMHG | DIASTOLIC BLOOD PRESSURE: 60 MMHG | HEART RATE: 89 BPM | WEIGHT: 244.49 LBS | BODY MASS INDEX: 46.16 KG/M2 | HEIGHT: 61 IN

## 2018-04-24 DIAGNOSIS — I63.9 ACUTE ISCHEMIC STROKE (H): Primary | ICD-10-CM

## 2018-04-24 DIAGNOSIS — I63.9 ACUTE ISCHEMIC STROKE (H): ICD-10-CM

## 2018-04-24 PROCEDURE — 99204 OFFICE O/P NEW MOD 45 MIN: CPT | Performed by: INTERNAL MEDICINE

## 2018-04-24 PROCEDURE — 40000809 ZZH STATISTIC NO DOCUMENTATION TO SUPPORT CHARGE

## 2018-04-24 ASSESSMENT — PAIN SCALES - GENERAL
PAINLEVEL: MODERATE PAIN (4)
PAINLEVEL: NO PAIN (0)

## 2018-04-24 NOTE — NURSING NOTE
Natali Myers  MRN: [id number]  Female, 58 year old, 1960  Hx of TIA     Saw Natali Myers with Dr. Nicole today for her history of TIA.  She had her second episode while on aspirin.  She is now on Plavix alone.  She also has GERD, history of migranes since age 5, and currently stressful job.    Family Hx: Dad-stroke/dementia, mom-ITP, son-MS (dx age 30)    We discussed lab results which are borderline/ stress reactants (F8 & fibrinogen).  Dr. Nicole is not recommending anticoagulation at this time.  She is wearing a patch now to check for possible Afib.  She has our contact card for any future questions.  We have not recommended any follow up visits.  Katelyn Vigil, RN, MSN -Nurse Clinician, [facility] [phone]

## 2018-04-24 NOTE — LETTER
4/24/2018       RE: Natali Myers  [address] [facility] [address]     Dear Colleague,    Thank you for referring your patient, Natali Myers, to the [facility] NEUROLOGY at [facility]. Please see a copy of my visit note below.          [facility] NEUROLOGY  [address]  3rd [address]  [facility] [address]      April 24, 2018    Natali Myers                                                                                                                     [address] [facility] [address]    Ms. Roblero is a very pleasant 58 year old lady who has had two strokes due to large vessel occlusions, one in 2016 and one in 2018. Details of her stroke and work up are described in my note from April 3, 2018. She has since been seen by Dr. Pretty in Cardiology and also is being seen by Hematology.     Today we discussed her work up in length including the RUSTY results, Hematology work up and Cardiology recommendations. She is on her second ziopatch. She will follow up with Sukhwinder Madrid in 4 weeks if there is AF. There was a question of mitral stenosis on her echo and this is going to be followed by Dr. Pretty with repeat echo in 2 years.    Details of hematology work up are listed below.      ASSESSMENT / PLAN    Encounter Diagnosis   Name Primary?     Acute ischemic stroke (H) Yes     Likely embolic strokes - 2 events, one in 2016 and one in 2018. We will await the ziopatch results and at this time, in the absence of any other etiology including a hypercoagulable state, we will have to consider whether to pursue an ILR if the zio is negative. Will have her RTC after zio results are available.      F2 and F5 = negative  Prot S normal   FBG normal  Prot C normal  FBG antigen slightly elevated  ATIII negative  LA negative  F VIII slighlt elevated  Cardiolipin and beta2 glycoprotein normal      RUSTY done on 4/9/2018      Interpretation Summary  No  cardiac source for embolus identified. The atrial septum is intact as  assessed by color Doppler and agitated dextrose bubble study .    Again, thank you for allowing me to participate in the care of your patient.      Sincerely,    José Miguel Snyder MD

## 2018-04-24 NOTE — PROGRESS NOTES
[facility] NEUROLOGY  [address]  [address] [facility] [address]          April 24, 2018    Natali Myers                                                                                                                     [address] [facility] [address]    Ms. Roblero is a very pleasant 58 year old lady who has had two strokes due to large vessel occlusions, one in 2016 and one in 2018. Details of her stroke and work up are described in my note from April 3, 2018. She has since been seen by Dr. Pretty in Cardiology and also is being seen by Hematology.     Today we discussed her work up in length including the RUSTY results, Hematology work up and Cardiology recommendations. She is on her second ziopatch. She will follow up with Sukhwinder Madrid in 4 weeks if there is AF. There was a question of mitral stenosis on her echo and this is going to be followed by Dr. Pretty with repeat echo in 2 years.    Details of hematology work up are listed below.      ASSESSMENT / PLAN    Encounter Diagnosis   Name Primary?     Acute ischemic stroke (H) Yes     Likely embolic strokes - 2 events, one in 2016 and one in 2018. We will await the ziopatch results and at this time, in the absence of any other etiology including a hypercoagulable state, we will have to consider whether to pursue an ILR if the zio is negative. Will have her RTC after zio results are available.          F2 and F5 = negative  Prot S normal   FBG normal  Prot C normal  FBG antigen slightly elevated  ATIII negative  LA negative  F VIII slighlt elevated  Cardiolipin and beta2 glycoprotein normal          RUSTY done on 4/9/2018       Interpretation Summary  No cardiac source for embolus identified. The atrial septum is intact as  assessed by color Doppler and agitated dextrose bubble study .      José Miguel Snyder MD    Answers for HPI/ROS submitted by the patient on 4/12/2018   General Symptoms: No  Skin Symptoms: No  HENT  Symptoms: No  EYE SYMPTOMS: No  HEART SYMPTOMS: No  LUNG SYMPTOMS: No  INTESTINAL SYMPTOMS: No  URINARY SYMPTOMS: Yes  GYNECOLOGIC SYMPTOMS: No  BREAST SYMPTOMS: No  SKELETAL SYMPTOMS: Yes  BLOOD SYMPTOMS: No  NERVOUS SYSTEM SYMPTOMS: Yes  MENTAL HEALTH SYMPTOMS: No  Trouble holding urine or incontinence: No  Pain or burning: No  Trouble starting or stopping: No  Increased frequency of urination: No  Blood in urine: No  Decreased frequency of urination: No  Frequent nighttime urination: No  Flank pain: No  Difficulty emptying bladder: No  Back pain: No  Muscle aches: No  Neck pain: No  Swollen joints: No  Joint pain: Yes  Bone pain: No  Muscle cramps: No  Muscle weakness: No  Joint stiffness: Yes  Bone fracture: No  Trouble with coordination: No  Dizziness or trouble with balance: No  Fainting or black-out spells: No  Memory loss: No  Headache: Yes  Seizures: No  Speech problems: No  Tingling: No  Tremor: No  Weakness: No  Difficulty walking: No  Paralysis: No  Numbness: No

## 2018-04-24 NOTE — MR AVS SNAPSHOT
After Visit Summary   4/24/2018    Natali Myers    MRN: [id number]           Patient Information     Date Of Birth          1960        Visit Information        Provider Department      4/24/2018 2:00 PM Frances Nicole MD [facility]        Today's Diagnoses     Acute ischemic stroke (H)          Care Instructions    1.  No follow up needed.  2. Stay on Plavix          Follow-ups after your visit        Your next 10 appointments already scheduled     Aug 29, 2018 11:30 AM CDT   Lab with  LAB   [facility] ([facility])    [address]  1st Floor  [facility] [address] [phone]            Aug 29, 2018 12:30 PM CDT   (Arrive by 12:00 PM)   Return Visit with Arnav Mccurdy MD   [facility] Nephrology ([facility])    [address] [place] [address]  [address]  [facility] [address] [phone]              Who to contact     If you have questions or need follow up information about today's clinic visit or your schedule please contact [facility] directly at [phone].  Normal or non-critical lab and imaging results will be communicated to you by FP Completehart, letter or phone within 4 business days after the clinic has received the results. If you do not hear from us within 7 days, please contact the clinic through KlikkaPromot or phone. If you have a critical or abnormal lab result, we will notify you by phone as soon as possible.  Submit refill requests through MiNOWireless or call your pharmacy and they will forward the refill request to us. Please allow 3 business days for your refill to be completed.          Additional Information About Your Visit        MyChart Information     MiNOWireless gives you secure access to your electronic health record. If you see a primary care provider, you can also send messages to your care team and make appointments. If you have questions, please  "call your primary care clinic.  If you do not have a primary care provider, please call [phone] and they will assist you.        Care EveryWhere ID     This is your Care EveryWhere ID. This could be used by other organizations to access your [place] medical records  [id number]        Your Vitals Were     Pulse Temperature Respirations Height Last Period Pulse Oximetry    92 97.6  F (36.4  C) (Oral) 12 1.562 m (5' 1.5\") (LMP Unknown) 100%    BMI (Body Mass Index)                   44.99 kg/m2            Blood Pressure from Last 3 Encounters:   04/24/18 122/80   04/24/18 128/60   04/14/18 122/74    Weight from Last 3 Encounters:   04/24/18 109.8 kg (242 lb)   04/24/18 110.9 kg (244 lb 7.8 oz)   04/14/18 110.9 kg (244 lb 6.4 oz)              Today, you had the following     No orders found for display       Primary Care Provider Office Phone # Fax #    Lilli Ivan [phone]       [address] [address]        Equal Access to Services     [facility]: Hadii aad ku hadasho Soomaali, waaxda luqadaha, qaybta kaalmada adeegyada, nima savage . So [facility] [phone].    ATENCIÓN: Si habla español, tiene a ojeda disposición servicios gratuitos de asistencia lingüística. Llame al [phone].    We comply with applicable federal civil rights laws and [place] laws. We do not discriminate on the basis of race, color, national origin, age, disability, sex, sexual orientation, or gender identity.            Thank you!     Thank you for choosing [facility]  for your care. Our goal is always to provide you with excellent care. Hearing back from our patients is one way we can continue to improve our services. Please take a few minutes to complete the written survey that you may receive in the mail after your visit with us. Thank you!             Your Updated Medication List - Protect others around you: Learn how to " safely use, store and throw away your medicines at www.disposemymeds.org.          This list is accurate as of 4/24/18 11:59 PM.  Always use your most recent med list.                   Brand Name Dispense Instructions for use Diagnosis    albuterol 108 (90 Base) MCG/ACT Inhaler    PROAIR HFA/PROVENTIL HFA/VENTOLIN HFA    1 Inhaler    Inhale 2 puffs into the lungs every 6 hours as needed for shortness of breath / dyspnea or wheezing    Mild intermittent asthma without complication       ALEVE 220 MG capsule   Generic drug:  naproxen sodium           atorvastatin 20 MG tablet    LIPITOR    90 tablet    Take 1 tablet (20 mg) by mouth daily    Cerebrovascular accident (CVA), unspecified mechanism (H)       Blood Pressure Monitoring Kit     1 kit    1 Device daily Pt to check BP readings daily at home    BMI 40.0-44.9, adult (H)       cetirizine 10 MG Chew    zyrTEC    30 tablet    Take 1 tablet (10 mg) by mouth as needed    Chronic allergic rhinitis, unspecified seasonality, unspecified trigger       clopidogrel 75 MG tablet    PLAVIX    30 tablet    Take 1 tablet (75 mg) by mouth daily    Acute CVA (cerebrovascular accident) (H)       DIPHENHYDRAMINE HCL PO      Take 25 mg by mouth nightly as needed        fluticasone 50 MCG/ACT spray    FLONASE ALLERGY RELIEF    1 Bottle    Spray 2 sprays into both nostrils daily    Chronic allergic rhinitis, unspecified seasonality, unspecified trigger       lisinopril 20 MG tablet    PRINIVIL/ZESTRIL    180 tablet    Take 1 tablet (20 mg) by mouth 2 times daily    Benign essential hypertension       pantoprazole 40 MG EC tablet    PROTONIX    90 tablet    Take 1 tablet (40 mg) by mouth daily    Gastroesophageal reflux disease without esophagitis       potassium chloride SA 10 MEQ CR tablet    K-DUR/KLOR-CON M    180 tablet    Take 2 tablets (20 mEq) by mouth daily    Hypokalemia

## 2018-04-24 NOTE — MR AVS SNAPSHOT
After Visit Summary   4/24/2018    Natali Myers    MRN: [id number]           Patient Information     Date Of Birth          1960        Visit Information        Provider Department      4/24/2018 11:50 AM José Miguel Snyder MD [facility] Neurology        Today's Diagnoses     Acute ischemic stroke (H)    -  1       Follow-ups after your visit        Follow-up notes from your care team     Return in about 6 months (around 10/24/2018).      Your next 10 appointments already scheduled     Aug 29, 2018 11:30 AM CDT   Lab with  LAB   [facility] ([facility])    [address]  1st Floor  [facility] [id number] [phone]            Aug 29, 2018 12:30 PM CDT   (Arrive by 12:00 PM)   Return Visit with Arnav Mccurdy MD   [facility] Nephrology ([facility])    [address] [place] [address]  [address]  [facility] [id number] [phone]              Who to contact     Please call your clinic at [phone] to:    Ask questions about your health    Make or cancel appointments    Discuss your medicines    Learn about your test results    Speak to your doctor            Additional Information About Your Visit        [employer]ia [employer] Information     [employer] gives you secure access to your electronic health record. If you see a primary care provider, you can also send messages to your care team and make appointments. If you have questions, please call your primary care clinic.  If you do not have a primary care provider, please call [phone] and they will assist you.      [employer] is an electronic gateway that provides easy, online access to your medical records. With [employer], you can request a clinic appointment, read your test results, renew a prescription or communicate with your care team.     To access your existing account, please contact your [facility] or call [phone] for assistance.        Care  "EveryWhere ID     This is your Care EveryWhere ID. This could be used by other organizations to access your [place] medical records  [id number]        Your Vitals Were     Pulse Height Last Period BMI (Body Mass Index)          89 1.549 m (5' 1\") (LMP Unknown) 46.2 kg/m2         Blood Pressure from Last 3 Encounters:   04/24/18 122/80   04/24/18 128/60   04/14/18 122/74    Weight from Last 3 Encounters:   04/24/18 109.8 kg (242 lb)   04/24/18 110.9 kg (244 lb 7.8 oz)   04/14/18 110.9 kg (244 lb 6.4 oz)              Today, you had the following     No orders found for display       Primary Care Provider Office Phone # Fax #    Lilli Ivan [phone] [address] [address]        Equal Access to Services     [facility] : Hadii aad ku hadasho Soomaali, waaxda luqadaha, qaybta kaalmada adeegyada, waxay leticiain hayterencen juan j savage . So [facility] [phone].    ATENCIÓN: Si habla español, tiene a ojeda disposición servicios gratuitos de asistencia lingüística. Bijan al [phone].    We comply with applicable federal civil rights laws and [place] laws. We do not discriminate on the basis of race, color, national origin, age, disability, sex, sexual orientation, or gender identity.            Thank you!     Thank you for choosing [facility] NEUROLOGY  for your care. Our goal is always to provide you with excellent care. Hearing back from our patients is one way we can continue to improve our services. Please take a few minutes to complete the written survey that you may receive in the mail after your visit with us. Thank you!             Your Updated Medication List - Protect others around you: Learn how to safely use, store and throw away your medicines at www.disposemymeds.org.          This list is accurate as of 4/24/18 11:59 PM.  Always use your most recent med list.                   Brand Name Dispense Instructions for use Diagnosis    albuterol 108 (90 " Base) MCG/ACT Inhaler    PROAIR HFA/PROVENTIL HFA/VENTOLIN HFA    1 Inhaler    Inhale 2 puffs into the lungs every 6 hours as needed for shortness of breath / dyspnea or wheezing    Mild intermittent asthma without complication       ALEVE 220 MG capsule   Generic drug:  naproxen sodium           atorvastatin 20 MG tablet    LIPITOR    90 tablet    Take 1 tablet (20 mg) by mouth daily    Cerebrovascular accident (CVA), unspecified mechanism (H)       Blood Pressure Monitoring Kit     1 kit    1 Device daily Pt to check BP readings daily at home    BMI 40.0-44.9, adult (H)       cetirizine 10 MG Chew    zyrTEC    30 tablet    Take 1 tablet (10 mg) by mouth as needed    Chronic allergic rhinitis, unspecified seasonality, unspecified trigger       clopidogrel 75 MG tablet    PLAVIX    30 tablet    Take 1 tablet (75 mg) by mouth daily    Acute CVA (cerebrovascular accident) (H)       DIPHENHYDRAMINE HCL PO      Take 25 mg by mouth nightly as needed        fluticasone 50 MCG/ACT spray    FLONASE ALLERGY RELIEF    1 Bottle    Spray 2 sprays into both nostrils daily    Chronic allergic rhinitis, unspecified seasonality, unspecified trigger       lisinopril 20 MG tablet    PRINIVIL/ZESTRIL    180 tablet    Take 1 tablet (20 mg) by mouth 2 times daily    Benign essential hypertension       pantoprazole 40 MG EC tablet    PROTONIX    90 tablet    Take 1 tablet (40 mg) by mouth daily    Gastroesophageal reflux disease without esophagitis       potassium chloride SA 10 MEQ CR tablet    K-DUR/KLOR-CON M    180 tablet    Take 2 tablets (20 mEq) by mouth daily    Hypokalemia

## 2018-04-26 ENCOUNTER — CARE COORDINATION (OUTPATIENT)
Dept: NEUROLOGY | Facility: CLINIC | Age: 58
End: 2018-04-26

## 2018-04-26 NOTE — PROGRESS NOTES
This is a new consultation for me for a patient who recently had a TIA/acute ischemic stroke.  At the time of her evaluation with Neurology, she was found to have mildly elevated factor VIII and fibrinogen and she is here to discuss the importance of those laboratory parameters and deciding what sort of anticoagulation she would need.      HISTORY OF PRESENT ILLNESS:  Natali is a 58-year-old female with past medical history remarkable for hypertension, who presented with her first TIA/CVA back in 2016.  At that time, she had extensive evaluation which did not reveal any clear  pathophysiology.  At that time, she was started on aspirin and she had uneventful course since - until January 2018 , when she presented with left-sided weakness.  There was sensory loss and dysarthria.  CTA was done which suggested M2 occlusion.  She received IV TPA and also mechanical thrombectomy.  Subsequently, brain MRI did not show an acute infarct.  She had no neurological deficits.  Dr. Snyder, the neurologist, obtained a hypercoagulable workup which was largely unremarkable for protein C, protein S abnormalities.  Lupus was negative and antithrombin III was normal.  Prothrombin gene mutation and factor II Leiden testing also was negative.  Homocysteine levels were within normal range.  The 2 abnormal laboratory findings were elevated factor VIII at 201 with a reference range of  and a fibrinogen antigen at 401 with a reference range of 149-353.  Anticoagulation was switched from aspirin to Plavix and she is here to discuss if she needs to add another anticoagulant to prevent further stroke risk.  The patient is wearing a ZIO Patch and she is getting further cardiac evaluation at this time.      PAST MEDICAL HISTORY:  Patient has completely recovered from that event except for some weird sensation of tingling.  Otherwise, no fevers, chills, coughing, breathing trouble, abdominal pain, no vomiting.  The patient states that  she has ongoing GERD and her job is very stressful.  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  Otherwise, she denies any particular complaints.      PAST MEDICAL HISTORY:     Past Medical History:   Diagnosis Date     Arthritis     OA left hip, knees, wrists     Asthma with bronchitis      Chronic headaches      GERD (gastroesophageal reflux disease)     omprazole     History of stroke without residual deficits 1/31/2018    headaches     Hypertension      Migraines     On-going, 1 every month or 2     Mitral stenosis 2018    mild, no murmur, rheumatic     Neck pain     trapezius tightness     Seasonal allergies     mold and grass     Stroke (H) July 18, 2016.    MRI scan that showed recent infarct in the central white matter of the right frontoparietal junction;            SURGICAL HISTORY:   Past Surgical History:   Procedure Laterality Date     DILATION AND CURETTAGE  5/24/2014    Procedure: DILATION AND CURETTAGE;  Surgeon: Elyse Mcconnell MD;  Location: [facility] OR     HYSTERECTOMY TOTAL ABDOMINAL  6/19/2014    Procedure: HYSTERECTOMY TOTAL ABDOMINAL;  Surgeon: Elyse Mcconnell MD;  Location: [facility] OR     KNEE SURGERY  age 23     cartilage/ incision both knees     RELEASE CARPAL TUNNEL BILATERAL  1996    [facility]     SALPINGO-OOPHORECTOMY BILATERAL  6/19/2014    Procedure: SALPINGO-OOPHORECTOMY BILATERAL;  Surgeon: Elyse Mcconnell MD;  Location: [facility] OR     WRIST SURGERY            SOCIAL HISTORY:   Social History     Social History     Marital status:      Spouse name: N/A     Number of children: 2     Years of education: N/A     Occupational History     analyst      Social History Main Topics     Smoking status: Never Smoker     Smokeless tobacco: Never Used     Alcohol use Yes      Comment: One a month      Drug use: No     Sexual activity: No       Comment: Currently not sexually active      Other Topics Concern     Not on file     Social History Narrative    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    , mother of 4 children; 4 grandchildren        FAMILY HISTORY:     Family History   Problem Relation Age of Onset     DIABETES Mother      Blood Disease Mother      ITP      KIDNEY DISEASE Mother      CANCER Mother      Cardiovascular Father      Alcohol/Drug Father      CEREBROVASCULAR DISEASE Father 70     ,     Dementia Father      DIABETES Maternal Grandmother      Thyroid Disease Maternal Grandmother      CANCER Paternal Grandmother      eye      Dementia Paternal Grandmother      Asthma Brother      Multiple Sclerosis Son 30        MEDICATIONS:        Current Outpatient Prescriptions   Medication Sig Dispense Refill     albuterol (PROAIR HFA/PROVENTIL HFA/VENTOLIN HFA) 108 (90 BASE) MCG/ACT Inhaler Inhale 2 puffs into the lungs every 6 hours as needed for shortness of breath / dyspnea or wheezing 1 Inhaler 1     atorvastatin (LIPITOR) 20 MG tablet Take 1 tablet (20 mg) by mouth daily 90 tablet 3     Blood Pressure Monitoring KIT 1 Device daily Pt to check BP readings daily at home 1 kit 0     cetirizine (ZYRTEC) 10 MG CHEW Take 1 tablet (10 mg) by mouth as needed 30 tablet 3     clopidogrel (PLAVIX) 75 MG tablet Take 1 tablet (75 mg) by mouth daily 30 tablet 11     DIPHENHYDRAMINE HCL PO Take 25 mg by mouth nightly as needed        fluticasone (FLONASE ALLERGY RELIEF) 50 MCG/ACT spray Spray 2 sprays into both nostrils daily 1 Bottle 3     lisinopril (PRINIVIL/ZESTRIL) 20 MG tablet Take 1 tablet (20 mg) by mouth 2 times daily 180 tablet 1     naproxen sodium (ALEVE) 220 MG capsule        pantoprazole (PROTONIX) 40 MG EC tablet Take 1 tablet (40 mg) by mouth daily 90 tablet 3     potassium chloride SA (K-DUR/KLOR-CON M) 10 MEQ CR tablet Take 2 tablets (20 mEq) by mouth daily 180 tablet 1     ALLERGIES:        Allergies   Allergen Reactions     Mold      Seasonal  "Allergies      Hay fever     Codeine Rash     Latex Rash     Pt states this is NOT a latex allergy,allergy is neoprene rubber/in knee brace     No Clinical Screening - See Comments Rash     Neoprene rubber       Penicillins Rash        PHYSICAL EXAMINATION:   VITAL SIGNS:   /80 (BP Location: Left arm, Patient Position: Sitting, Cuff Size: Adult Large)  Pulse 92  Temp 97.6  F (36.4  C) (Oral)  Resp 12  Ht 1.562 m (5' 1.5\")  Wt 109.8 kg (242 lb)  LMP  (LMP Unknown)  SpO2 100%  BMI 44.99 kg/m2    GENERAL:  Alert and oriented x3, in no apparent distress.   HEENT:  Moist mucous membranes.   LOWER EXTREMITIES:  No pedal edema.  Cranial nerves II-XII intact.  Moving all extremities appropriately.   PSYCHIATRIC:  Appropriate affect.   SKIN:  No visible rash.      LABORATORY EVALUATION:  As detailed in H&P.      IMAGING:  As detailed in H&P.      ASSESSMENT AND PLAN:   1.  Patient with 2 strokes over the course of the past 2 years.   2.  Extensive hypercoagulable workup remarkable for minimally elevated factor VIII and minimally elevated fibrinogen antigen.  I had a detailed discussion with the patient.  It is rather unfortunate that she has had 2 strokes at this young age.  I think her stressful work environment does have a significant contribution to her presentations and also her laboratory evaluation, which are rather labile tests and reflected in her lab testing.  The thrombophilia evaluation that she has had is essentially negative.  When I think of a stroke, I think of it as more of an arterial issue and most of the workup she had is venous, but having said that, her lupus anticoagulant was negative with a normal CBC.  I do not feel a need for pursuing PNH or JAK2 testing.  At this point, they have switched around her anticoagulation from aspirin to Plavix.  From a hematological standpoint, I do not think there is any need to add more blood thinning, putting her at higher risk for developing bleeding " complications, so I would defer the final anticoagulant  Development plan to Dr. Snyder.  But I discussed with the patient that at this point, those 2 laboratory parameters, we can recheck them to establish their status, but even if they are elevated, they would not change my recommendation.  The patient expressed understanding and was grateful to the fact that she would not need to be on multiple blood thinners.     I spent 55 minutes in the care of this patient >50% of which was spent in coordinating and counseling.    Frances Nicole   of Medicine   Hematology and medical Oncology   [facility]

## 2018-04-26 NOTE — ADDENDUM NOTE
Encounter addended by: Shireen Gonzalez on: 4/26/2018  7:40 AM<BR>     Actions taken: Imaging Exam begun

## 2018-04-26 NOTE — PROGRESS NOTES
Call placed to patient to complete the mRS questionnaire. Attempt #1 - LVM message for patient to return call.

## 2018-04-27 NOTE — PROGRESS NOTES
Patient was contacted to complete the mRS questionnaire. Patient's score was 0. Patient denies any concerns and will follow up as previously directed. Patient has contact information and was instructed to call with any new concerns.

## 2018-04-30 ENCOUNTER — TELEPHONE (OUTPATIENT)
Dept: NEUROLOGY | Facility: CLINIC | Age: 58
End: 2018-04-30

## 2018-04-30 NOTE — TELEPHONE ENCOUNTER
----- Message from José Miguel Snyder MD sent at 4/28/2018 10:28 AM CDT -----  Hi - can you let this lady know that she should come back and see me after the ziopatch results are back - I have been talking to Dr. Pretty re: her and I will need to see her - thanks    KL  
Patient informed. She mailed Ziopatch back today. Message sent to scheduling to contact patient to make appointment. Patient has my contact information and was encouraged to call with questions/concerns.     
(0) independent

## 2018-05-18 DIAGNOSIS — I47.10 SVT (SUPRAVENTRICULAR TACHYCARDIA) (H): ICD-10-CM

## 2018-06-25 ASSESSMENT — ENCOUNTER SYMPTOMS
SKIN CHANGES: 0
NAIL CHANGES: 0
POOR WOUND HEALING: 0

## 2018-06-26 ENCOUNTER — OFFICE VISIT (OUTPATIENT)
Dept: NEUROLOGY | Facility: CLINIC | Age: 58
End: 2018-06-26
Payer: COMMERCIAL

## 2018-06-26 VITALS
WEIGHT: 247.6 LBS | BODY MASS INDEX: 45.56 KG/M2 | DIASTOLIC BLOOD PRESSURE: 78 MMHG | HEIGHT: 62 IN | SYSTOLIC BLOOD PRESSURE: 126 MMHG | HEART RATE: 89 BPM

## 2018-06-26 DIAGNOSIS — I63.9 CEREBROVASCULAR ACCIDENT (CVA), UNSPECIFIED MECHANISM (H): ICD-10-CM

## 2018-06-26 DIAGNOSIS — I10 BENIGN ESSENTIAL HYPERTENSION: ICD-10-CM

## 2018-06-26 DIAGNOSIS — I63.9 CEREBROVASCULAR ACCIDENT (CVA), UNSPECIFIED MECHANISM (H): Primary | ICD-10-CM

## 2018-06-26 LAB
CRP SERPL-MCNC: 6.9 MG/L (ref 0–8)
ERYTHROCYTE [SEDIMENTATION RATE] IN BLOOD BY WESTERGREN METHOD: 13 MM/H (ref 0–30)
PLATELET REACTIVITY P2Y12: 118 PRU
POTASSIUM SERPL-SCNC: 4.2 MMOL/L (ref 3.4–5.3)

## 2018-06-26 ASSESSMENT — PAIN SCALES - GENERAL: PAINLEVEL: NO PAIN (0)

## 2018-06-26 NOTE — PROGRESS NOTES
[facility] NEUROLOGY  [address]  3rd Floor  [place] [address]          June 26, 2018    Natali Myers                                                                                                                     [address] [facility] [address]      Ms. Roblero is a pleasant 58 year old lady who has had two strokes due to large vessel occlusions, one in 2016 and one in 2018. Details of her stroke and work up are described in my note from April 3, 2018 and April 24, 2018. She has been seen by Dr. Pretty in Cardiology and also is being seen by Hematology. She has had two cardiac event monitors and these were not revealing of AFIB. She has seen Hematology and they do not identify a cause for the large vessel occlusions.     Today we discussed possible stroke etiologies. I have placed a referral for a LINQ ILR. I have ordered an ESR and CRP and we will consider an LP though with events two years apart, I think vasculitis is less likely.     EXAM is below and is unchanged from earlier visits.    ASSESSMENT / PLAN    Encounter Diagnosis   Name Primary?     Cerebrovascular accident (CVA), unspecified mechanism (H) Yes     Orders Placed This Encounter   Procedures     Erythrocyte sedimentation rate auto     CRP inflammation     Platelet Function P2Y12     CARDIOLOGY PROCEDURE ADULT REFERRAL        ADDENDUM    The work up ordered by me is back and ESR and CRP are normal. Platelet function tests shows that she is sensitive to Clopidogrel. We will wait for the ILR implantation and see her back in clinic in 3 months.              Past Medical History:   Diagnosis Date     Arthritis     OA left hip, knees, wrists     Asthma with bronchitis      Chronic headaches      GERD (gastroesophageal reflux disease)     omprazole     History of stroke without residual deficits 1/31/2018    headaches     Hypertension      Migraines     On-going, 1 every month or 2     Mitral stenosis 2018    mild,  "no murmur, rheumatic     Neck pain     trapezius tightness     Seasonal allergies     mold and grass     Stroke (H) July 18, 2016.    MRI scan that showed recent infarct in the central white matter of the right frontoparietal junction;         Social History   Substance Use Topics     Smoking status: Never Smoker     Smokeless tobacco: Never Used     Alcohol use Yes      Comment: One a month      Current Outpatient Prescriptions   Medication     albuterol (PROAIR HFA/PROVENTIL HFA/VENTOLIN HFA) 108 (90 BASE) MCG/ACT Inhaler     atorvastatin (LIPITOR) 20 MG tablet     Blood Pressure Monitoring KIT     cetirizine (ZYRTEC) 10 MG CHEW     clopidogrel (PLAVIX) 75 MG tablet     DIPHENHYDRAMINE HCL PO     fluticasone (FLONASE ALLERGY RELIEF) 50 MCG/ACT spray     lisinopril (PRINIVIL/ZESTRIL) 20 MG tablet     naproxen sodium (ALEVE) 220 MG capsule     pantoprazole (PROTONIX) 40 MG EC tablet     potassium chloride SA (K-DUR/KLOR-CON M) 10 MEQ CR tablet     No current facility-administered medications for this visit.        Exam    /78 (BP Location: Right arm, Patient Position: Sitting, Cuff Size: Adult Large)  Pulse 89  Ht 1.562 m (5' 1.5\")  Wt 112.3 kg (247 lb 9.6 oz)  LMP  (LMP Unknown)  BMI 46.03 kg/m2    Exam unchanged from April 3, 2018. Neurological exam normal.        José Miguel Snyder MD    Answers for HPI/ROS submitted by the patient on 6/25/2018   General Symptoms: No  Skin Symptoms: Yes  HENT Symptoms: No  EYE SYMPTOMS: No  HEART SYMPTOMS: No  LUNG SYMPTOMS: No  INTESTINAL SYMPTOMS: No  URINARY SYMPTOMS: No  GYNECOLOGIC SYMPTOMS: No  BREAST SYMPTOMS: No  SKELETAL SYMPTOMS: No  BLOOD SYMPTOMS: No  NERVOUS SYSTEM SYMPTOMS: No  MENTAL HEALTH SYMPTOMS: No  Changes in hair: No  Changes in moles/birth marks: No  Itching: Yes  Rashes: No  Changes in nails: No  Acne: No  Hair in places you don't want it: No  Change in facial hair: No  Warts: No  Non-healing sores: No  Scarring: No  Flaking of skin: " Yes  Color changes of hands/feet in cold : No  Sun sensitivity: No  Skin thickening: No

## 2018-06-26 NOTE — LETTER
6/26/2018       RE: Natali Myers  [address] [facility] [address]     Dear Colleague,    Thank you for referring your patient, Natali Myers, to the [facility] NEUROLOGY at [facility]. Please see a copy of my visit note below.          [facility] NEUROLOGY  [address]  3rd Floor  [facility] [address]          June 26, 2018    Natali Myers                                                                                                                     [address] [facility] [address]      Ms. Roblero is a pleasant 58 year old lady who has had two strokes due to large vessel occlusions, one in 2016 and one in 2018. Details of her stroke and work up are described in my note from April 3, 2018 and April 24, 2018. She has been seen by Dr. Pretty in Cardiology and also is being seen by Hematology. She has had two cardiac event monitors and these were not revealing of AFIB. She has seen Hematology and they do not identify a cause for the large vessel occlusions.     Today we discussed possible stroke etiologies. I have placed a referral for a LINQ ILR. I have ordered an ESR and CRP and we will consider an LP though with events two years apart, I think vasculitis is less likely.     EXAM is below and is unchanged from earlier visits.    ASSESSMENT / PLAN    Encounter Diagnosis   Name Primary?     Cerebrovascular accident (CVA), unspecified mechanism (H) Yes     Orders Placed This Encounter   Procedures     Erythrocyte sedimentation rate auto     CRP inflammation     Platelet Function P2Y12     CARDIOLOGY PROCEDURE ADULT REFERRAL        ADDENDUM    The work up ordered by me is back and ESR and CRP are normal. Platelet function tests shows that she is sensitive to Clopidogrel. We will wait for the ILR implantation and see her back in clinic in 3 months.              Past Medical History:   Diagnosis Date     Arthritis     OA left hip,  "knees, wrists     Asthma with bronchitis      Chronic headaches      GERD (gastroesophageal reflux disease)     omprazole     History of stroke without residual deficits 1/31/2018    headaches     Hypertension      Migraines     On-going, 1 every month or 2     Mitral stenosis 2018    mild, no murmur, rheumatic     Neck pain     trapezius tightness     Seasonal allergies     mold and grass     Stroke (H) July 18, 2016.    MRI scan that showed recent infarct in the central white matter of the right frontoparietal junction;         Social History   Substance Use Topics     Smoking status: Never Smoker     Smokeless tobacco: Never Used     Alcohol use Yes      Comment: One a month      Current Outpatient Prescriptions   Medication     albuterol (PROAIR HFA/PROVENTIL HFA/VENTOLIN HFA) 108 (90 BASE) MCG/ACT Inhaler     atorvastatin (LIPITOR) 20 MG tablet     Blood Pressure Monitoring KIT     cetirizine (ZYRTEC) 10 MG CHEW     clopidogrel (PLAVIX) 75 MG tablet     DIPHENHYDRAMINE HCL PO     fluticasone (FLONASE ALLERGY RELIEF) 50 MCG/ACT spray     lisinopril (PRINIVIL/ZESTRIL) 20 MG tablet     naproxen sodium (ALEVE) 220 MG capsule     pantoprazole (PROTONIX) 40 MG EC tablet     potassium chloride SA (K-DUR/KLOR-CON M) 10 MEQ CR tablet     No current facility-administered medications for this visit.        Exam    /78 (BP Location: Right arm, Patient Position: Sitting, Cuff Size: Adult Large)  Pulse 89  Ht 1.562 m (5' 1.5\")  Wt 112.3 kg (247 lb 9.6 oz)  LMP  (LMP Unknown)  BMI 46.03 kg/m2    Exam unchanged from April 3, 2018. Neurological exam normal.        José Miguel Snyder MD         Again, thank you for allowing me to participate in the care of your patient.      Sincerely,    José Miguel Snyder MD      "

## 2018-06-26 NOTE — MR AVS SNAPSHOT
After Visit Summary   6/26/2018    Natali Myers    MRN: [id number]           Patient Information     Date Of Birth          1960        Visit Information        Provider Department      6/26/2018 1:50 PM José Miguel Snyder MD [facility] Neurology        Today's Diagnoses     Cerebrovascular accident (CVA), unspecified mechanism (H)    -  1       Follow-ups after your visit        Additional Services     CARDIOLOGY PROCEDURE ADULT REFERRAL       Your provider has referred you to:  Cardiology for an Implantable Loop Recorder    - Dr. Pretty was in agreement with this. Stroke Cryptogenic      [facility]: Heart Care - [facility] [phone]   [email or web address]    Cardiology procedures to be completed: ILR for Cryptogenic stroke,     Please be aware that coverage of these services is subject to the terms and limitations of your health insurance plan.  Call member services at your health plan with any benefit or coverage questions.     Please bring the following to your appointment:  >>   Any x-rays, CTs or MRIs which have been performed.  Contact the facility where they were done to arrange for  prior to your scheduled appointment.   >>   List of current medications  >>   This referral request   >>   Any documents/labs given to you for this referral    [facility]   For scheduling questions call [phone]    [facility]  For scheduling questions call [phone]                  Follow-up notes from your care team     Return in about 3 months (around 9/26/2018).      Your next 10 appointments already scheduled     Aug 29, 2018 12:30 PM CDT   Lab with [facility] ([facility])    [address] [place] [address] [phone]            Aug 29, 2018  1:30 PM CDT   (Arrive by  "1:00 PM)   Return Visit with Vinnie Lloyd MD   [facility] Nephrology ([facility])    [address]  [address]  [facility] [address] [phone]              Who to contact     Please call your clinic at [phone] to:    Ask questions about your health    Make or cancel appointments    Discuss your medicines    Learn about your test results    Speak to your doctor            Additional Information About Your Visit        [employer] Information     tritrue gives you secure access to your electronic health record. If you see a primary care provider, you can also send messages to your care team and make appointments. If you have questions, please call your primary care clinic.  If you do not have a primary care provider, please call [phone] and they will assist you.      tritrue is an electronic gateway that provides easy, online access to your medical records. With tritrue, you can request a clinic appointment, read your test results, renew a prescription or communicate with your care team.     To access your existing account, please contact your [facility] or call [phone] for assistance.        Care EveryWhere ID     This is your Care EveryWhere ID. This could be used by other organizations to access your [place] medical records  [id number]        Your Vitals Were     Pulse Height Last Period BMI (Body Mass Index)          89 1.562 m (5' 1.5\") (LMP Unknown) 46.03 kg/m2         Blood Pressure from Last 3 Encounters:   06/26/18 126/78   04/24/18 122/80   04/24/18 128/60    Weight from Last 3 Encounters:   06/26/18 112.3 kg (247 lb 9.6 oz)   04/24/18 109.8 kg (242 lb)   04/24/18 110.9 kg (244 lb 7.8 oz)              We Performed the Following     CARDIOLOGY PROCEDURE ADULT REFERRAL        Primary Care Provider Office Phone # Fax #    Lilli Ivan [phone]       [address]  [facility] [place] [address]      "   Equal Access to Services     [facility]: Hadii luther clifford haley Booth, waaxda luqadaha, qaybta kaalever bladeshaunpenny, nima diazradhaakhil savage . So [facility] [phone].    ATENCIÓN: Si habla español, tiene a ojeda disposición servicios gratuitos de asistencia lingüística. Matiasame al [phone].    We comply with applicable federal civil rights laws and [place] laws. We do not discriminate on the basis of race, color, national origin, age, disability, sex, sexual orientation, or gender identity.            Thank you!     Thank you for choosing [facility] NEUROLOGY  for your care. Our goal is always to provide you with excellent care. Hearing back from our patients is one way we can continue to improve our services. Please take a few minutes to complete the written survey that you may receive in the mail after your visit with us. Thank you!             Your Updated Medication List - Protect others around you: Learn how to safely use, store and throw away your medicines at www.disposemymeds.org.          This list is accurate as of 6/26/18 11:59 PM.  Always use your most recent med list.                   Brand Name Dispense Instructions for use Diagnosis    albuterol 108 (90 Base) MCG/ACT Inhaler    PROAIR HFA/PROVENTIL HFA/VENTOLIN HFA    1 Inhaler    Inhale 2 puffs into the lungs every 6 hours as needed for shortness of breath / dyspnea or wheezing    Mild intermittent asthma without complication       ALEVE 220 MG capsule   Generic drug:  naproxen sodium           atorvastatin 20 MG tablet    LIPITOR    90 tablet    Take 1 tablet (20 mg) by mouth daily    Cerebrovascular accident (CVA), unspecified mechanism (H)       Blood Pressure Monitoring Kit     1 kit    1 Device daily Pt to check BP readings daily at home    BMI 40.0-44.9, adult (H)       cetirizine 10 MG Chew    zyrTEC    30 tablet    Take 1 tablet (10 mg) by mouth as needed    Chronic allergic rhinitis, unspecified seasonality, unspecified  trigger       clopidogrel 75 MG tablet    PLAVIX    30 tablet    Take 1 tablet (75 mg) by mouth daily    Acute CVA (cerebrovascular accident) (H)       DIPHENHYDRAMINE HCL PO      Take 25 mg by mouth nightly as needed        fluticasone 50 MCG/ACT spray    FLONASE ALLERGY RELIEF    1 Bottle    Spray 2 sprays into both nostrils daily    Chronic allergic rhinitis, unspecified seasonality, unspecified trigger       lisinopril 20 MG tablet    PRINIVIL/ZESTRIL    180 tablet    Take 1 tablet (20 mg) by mouth 2 times daily    Benign essential hypertension       pantoprazole 40 MG EC tablet    PROTONIX    90 tablet    Take 1 tablet (40 mg) by mouth daily    Gastroesophageal reflux disease without esophagitis       potassium chloride SA 10 MEQ CR tablet    K-DUR/KLOR-CON M    180 tablet    Take 2 tablets (20 mEq) by mouth daily    Hypokalemia

## 2018-07-19 ENCOUNTER — CARE COORDINATION (OUTPATIENT)
Dept: CARDIOLOGY | Facility: CLINIC | Age: 58
End: 2018-07-19

## 2018-07-19 DIAGNOSIS — Z86.73 HISTORY OF TIA (TRANSIENT ISCHEMIC ATTACK) AND STROKE: Primary | ICD-10-CM

## 2018-07-19 RX ORDER — CLINDAMYCIN PHOSPHATE 900 MG/50ML
900 INJECTION, SOLUTION INTRAVENOUS
Status: CANCELLED | OUTPATIENT
Start: 2018-07-19 | End: 2038-07-20

## 2018-07-19 NOTE — PROGRESS NOTES
Patient is agreeable to having a ILR implant for 2 past strokes. She has worn 2 [employer]iop[employer] monitors, both revealing absence of atrial fibrillation

## 2018-07-27 ENCOUNTER — HOSPITAL ENCOUNTER (OUTPATIENT)
Facility: CLINIC | Age: 58
End: 2018-07-27
Admitting: INTERNAL MEDICINE
Payer: COMMERCIAL

## 2018-08-01 ENCOUNTER — APPOINTMENT (OUTPATIENT)
Dept: CARDIOLOGY | Facility: CLINIC | Age: 58
End: 2018-08-01
Attending: INTERNAL MEDICINE
Payer: COMMERCIAL

## 2018-08-01 ENCOUNTER — HOSPITAL ENCOUNTER (OUTPATIENT)
Facility: CLINIC | Age: 58
Discharge: HOME | End: 2018-08-01
Attending: INTERNAL MEDICINE | Admitting: INTERNAL MEDICINE
Payer: COMMERCIAL

## 2018-08-01 ENCOUNTER — APPOINTMENT (OUTPATIENT)
Dept: MEDSURG UNIT | Facility: CLINIC | Age: 58
End: 2018-08-01
Attending: INTERNAL MEDICINE
Payer: COMMERCIAL

## 2018-08-01 VITALS
DIASTOLIC BLOOD PRESSURE: 86 MMHG | SYSTOLIC BLOOD PRESSURE: 111 MMHG | TEMPERATURE: 98.1 F | OXYGEN SATURATION: 95 % | HEIGHT: 61 IN | RESPIRATION RATE: 16 BRPM | WEIGHT: 245 LBS | BODY MASS INDEX: 46.26 KG/M2 | HEART RATE: 74 BPM

## 2018-08-01 DIAGNOSIS — Z86.73 HISTORY OF TIA (TRANSIENT ISCHEMIC ATTACK) AND STROKE: ICD-10-CM

## 2018-08-01 PROBLEM — Z95.818 STATUS POST PLACEMENT OF IMPLANTABLE LOOP RECORDER: Status: ACTIVE | Noted: 2018-08-01

## 2018-08-01 LAB
ANION GAP SERPL CALCULATED.3IONS-SCNC: 8 MMOL/L (ref 3–14)
BUN SERPL-MCNC: 11 MG/DL (ref 7–30)
CALCIUM SERPL-MCNC: 9.1 MG/DL (ref 8.5–10.1)
CHLORIDE SERPL-SCNC: 107 MMOL/L (ref 94–109)
CO2 SERPL-SCNC: 25 MMOL/L (ref 20–32)
CREAT SERPL-MCNC: 0.95 MG/DL (ref 0.52–1.04)
ERYTHROCYTE [DISTWIDTH] IN BLOOD BY AUTOMATED COUNT: 12.8 % (ref 10–15)
GFR SERPL CREATININE-BSD FRML MDRD: 60 ML/MIN/1.7M2
GLUCOSE SERPL-MCNC: 104 MG/DL (ref 70–99)
HCT VFR BLD AUTO: 39.6 % (ref 35–47)
HGB BLD-MCNC: 13.6 G/DL (ref 11.7–15.7)
INTERPRETATION ECG - MUSE: NORMAL
MCH RBC QN AUTO: 32.7 PG (ref 26.5–33)
MCHC RBC AUTO-ENTMCNC: 34.3 G/DL (ref 31.5–36.5)
MCV RBC AUTO: 95 FL (ref 78–100)
PLATELET # BLD AUTO: 167 10E9/L (ref 150–450)
POTASSIUM SERPL-SCNC: 3.8 MMOL/L (ref 3.4–5.3)
RBC # BLD AUTO: 4.16 10E12/L (ref 3.8–5.2)
SODIUM SERPL-SCNC: 140 MMOL/L (ref 133–144)
WBC # BLD AUTO: 5.4 10E9/L (ref 4–11)

## 2018-08-01 PROCEDURE — 33282 ZZHC LOOP RECORDER IMPLANT: CPT

## 2018-08-01 PROCEDURE — 85027 COMPLETE CBC AUTOMATED: CPT | Performed by: INTERNAL MEDICINE

## 2018-08-01 PROCEDURE — 93005 ELECTROCARDIOGRAM TRACING: CPT

## 2018-08-01 PROCEDURE — 40000065 ZZH STATISTIC EKG NON-CHARGEABLE

## 2018-08-01 PROCEDURE — 40000166 ZZH STATISTIC PP CARE STAGE 1

## 2018-08-01 PROCEDURE — 27210957 ZZH ACCESS EP PROC CR5

## 2018-08-01 PROCEDURE — C1764 EVENT RECORDER, CARDIAC: HCPCS

## 2018-08-01 PROCEDURE — 80048 BASIC METABOLIC PNL TOTAL CA: CPT | Performed by: INTERNAL MEDICINE

## 2018-08-01 PROCEDURE — 33282 ZZHC LOOP RECORDER IMPLANT: CPT | Mod: GC | Performed by: INTERNAL MEDICINE

## 2018-08-01 PROCEDURE — 25000125 ZZHC RX 250: Performed by: INTERNAL MEDICINE

## 2018-08-01 RX ORDER — ACETAMINOPHEN 325 MG/1
650 TABLET ORAL EVERY 4 HOURS PRN
Status: CANCELLED | OUTPATIENT
Start: 2018-08-01

## 2018-08-01 RX ORDER — CLINDAMYCIN PHOSPHATE 900 MG/50ML
900 INJECTION, SOLUTION INTRAVENOUS
Status: COMPLETED | OUTPATIENT
Start: 2018-08-01 | End: 2018-08-01

## 2018-08-01 RX ORDER — DIPHENHYDRAMINE HYDROCHLORIDE 50 MG/ML
25-50 INJECTION INTRAMUSCULAR; INTRAVENOUS
Status: DISCONTINUED | OUTPATIENT
Start: 2018-08-01 | End: 2018-08-01 | Stop reason: HOSPADM

## 2018-08-01 RX ORDER — ATROPINE SULFATE 0.1 MG/ML
.5-1 INJECTION INTRAVENOUS
Status: DISCONTINUED | OUTPATIENT
Start: 2018-08-01 | End: 2018-08-01 | Stop reason: HOSPADM

## 2018-08-01 RX ORDER — PROTAMINE SULFATE 10 MG/ML
1-5 INJECTION, SOLUTION INTRAVENOUS
Status: DISCONTINUED | OUTPATIENT
Start: 2018-08-01 | End: 2018-08-01 | Stop reason: HOSPADM

## 2018-08-01 RX ORDER — NALOXONE HYDROCHLORIDE 0.4 MG/ML
0.4 INJECTION, SOLUTION INTRAMUSCULAR; INTRAVENOUS; SUBCUTANEOUS EVERY 5 MIN PRN
Status: DISCONTINUED | OUTPATIENT
Start: 2018-08-01 | End: 2018-08-01 | Stop reason: HOSPADM

## 2018-08-01 RX ORDER — ADENOSINE 3 MG/ML
6-12 INJECTION, SOLUTION INTRAVENOUS EVERY 5 MIN PRN
Status: DISCONTINUED | OUTPATIENT
Start: 2018-08-01 | End: 2018-08-01 | Stop reason: HOSPADM

## 2018-08-01 RX ORDER — NALOXONE HYDROCHLORIDE 0.4 MG/ML
.1-.4 INJECTION, SOLUTION INTRAMUSCULAR; INTRAVENOUS; SUBCUTANEOUS
Status: CANCELLED | OUTPATIENT
Start: 2018-08-01

## 2018-08-01 RX ORDER — LORAZEPAM 2 MG/ML
.5-2 INJECTION INTRAMUSCULAR EVERY 10 MIN PRN
Status: DISCONTINUED | OUTPATIENT
Start: 2018-08-01 | End: 2018-08-01 | Stop reason: HOSPADM

## 2018-08-01 RX ORDER — FLUMAZENIL 0.1 MG/ML
0.2 INJECTION, SOLUTION INTRAVENOUS
Status: DISCONTINUED | OUTPATIENT
Start: 2018-08-01 | End: 2018-08-01 | Stop reason: HOSPADM

## 2018-08-01 RX ORDER — KETOROLAC TROMETHAMINE 30 MG/ML
15-30 INJECTION, SOLUTION INTRAMUSCULAR; INTRAVENOUS
Status: DISCONTINUED | OUTPATIENT
Start: 2018-08-01 | End: 2018-08-01 | Stop reason: HOSPADM

## 2018-08-01 RX ORDER — PROTAMINE SULFATE 10 MG/ML
5-40 INJECTION, SOLUTION INTRAVENOUS EVERY 10 MIN PRN
Status: DISCONTINUED | OUTPATIENT
Start: 2018-08-01 | End: 2018-08-01 | Stop reason: HOSPADM

## 2018-08-01 RX ORDER — HEPARIN SODIUM 1000 [USP'U]/ML
1000-10000 INJECTION, SOLUTION INTRAVENOUS; SUBCUTANEOUS EVERY 5 MIN PRN
Status: DISCONTINUED | OUTPATIENT
Start: 2018-08-01 | End: 2018-08-01 | Stop reason: HOSPADM

## 2018-08-01 RX ORDER — ONDANSETRON 2 MG/ML
4 INJECTION INTRAMUSCULAR; INTRAVENOUS EVERY 4 HOURS PRN
Status: DISCONTINUED | OUTPATIENT
Start: 2018-08-01 | End: 2018-08-01 | Stop reason: HOSPADM

## 2018-08-01 RX ORDER — LIDOCAINE 40 MG/G
CREAM TOPICAL
Status: CANCELLED | OUTPATIENT
Start: 2018-08-01

## 2018-08-01 RX ORDER — FENTANYL CITRATE 50 UG/ML
25-50 INJECTION, SOLUTION INTRAMUSCULAR; INTRAVENOUS
Status: DISCONTINUED | OUTPATIENT
Start: 2018-08-01 | End: 2018-08-01 | Stop reason: HOSPADM

## 2018-08-01 RX ORDER — DOBUTAMINE HYDROCHLORIDE 200 MG/100ML
5-40 INJECTION INTRAVENOUS CONTINUOUS PRN
Status: DISCONTINUED | OUTPATIENT
Start: 2018-08-01 | End: 2018-08-01 | Stop reason: HOSPADM

## 2018-08-01 RX ORDER — FUROSEMIDE 10 MG/ML
20-100 INJECTION INTRAMUSCULAR; INTRAVENOUS
Status: DISCONTINUED | OUTPATIENT
Start: 2018-08-01 | End: 2018-08-01 | Stop reason: HOSPADM

## 2018-08-01 RX ADMIN — CLINDAMYCIN PHOSPHATE 900 MG: 18 INJECTION, SOLUTION INTRAVENOUS at 08:33

## 2018-08-01 NOTE — IP AVS SNAPSHOT
[address] [address]                                       After Visit Summary   8/1/2018    Natali Myers    MRN: [id number]           After Visit Summary Signature Page     I have received my discharge instructions, and my questions have been answered. I have discussed any challenges I see with this plan with the nurse or doctor.    ..........................................................................................................................................  Patient/Patient Representative Signature      ..........................................................................................................................................  Patient Representative Print Name and Relationship to Patient    ..................................................               ................................................  Date                                            Time    ..........................................................................................................................................  Reviewed by Signature/Title    ...................................................              ..............................................  Date                                                            Time

## 2018-08-01 NOTE — PROGRESS NOTES
Patient tolerated recovery stage well. VSS, mid chest site clean/dry/intact, no hematoma, and denies pain. Patient tolerated PO food and fluids. Teaching was done and discharge instructions were given. Patient ambulated, voided, and PIV was removed. Patient discharged from the hospital via ambulatory to home per self transportation.

## 2018-08-01 NOTE — PROGRESS NOTES
Returned from [facility] post-loop recorder implant.  Denies pain. Site mid chest C/D/I, covered with medical glue.  Offered po food & fluids.  Awaiting device nurse to review discharge instructions.

## 2018-08-01 NOTE — IP AVS SNAPSHOT
MRN:[id number]                      After Visit Summary   8/1/2018    Natali Myers    MRN: [id number]           Visit Information        Department      8/1/2018  6:46 AM [address] [facility]          Review of your medicines      UNREVIEWED medicines. Ask your doctor about these medicines        Dose / Directions    albuterol 108 (90 Base) MCG/ACT Inhaler   Commonly known as:  PROAIR HFA/PROVENTIL HFA/VENTOLIN HFA   Used for:  Mild intermittent asthma without complication        Dose:  2 puff   Inhale 2 puffs into the lungs every 6 hours as needed for shortness of breath / dyspnea or wheezing   Quantity:  1 Inhaler   Refills:  1       ALEVE 220 MG capsule   Generic drug:  naproxen sodium        Refills:  0       atorvastatin 20 MG tablet   Commonly known as:  LIPITOR   Used for:  Cerebrovascular accident (CVA), unspecified mechanism (H)        Dose:  20 mg   Take 1 tablet (20 mg) by mouth daily   Quantity:  90 tablet   Refills:  3       cetirizine 10 MG Chew   Commonly known as:  zyrTEC   Used for:  Chronic allergic rhinitis, unspecified seasonality, unspecified trigger        Dose:  10 mg   Take 1 tablet (10 mg) by mouth as needed   Quantity:  30 tablet   Refills:  3       clopidogrel 75 MG tablet   Commonly known as:  PLAVIX   Used for:  Acute CVA (cerebrovascular accident) (H)        Dose:  75 mg   Take 1 tablet (75 mg) by mouth daily   Quantity:  30 tablet   Refills:  11       DIPHENHYDRAMINE HCL PO        Dose:  25 mg   Take 25 mg by mouth nightly as needed   Refills:  0       fluticasone 50 MCG/ACT spray   Commonly known as:  FLONASE ALLERGY RELIEF   Used for:  Chronic allergic rhinitis, unspecified seasonality, unspecified trigger        Dose:  2 spray   Spray 2 sprays into both nostrils daily   Quantity:  1 Bottle   Refills:  3       lisinopril 20 MG tablet   Commonly known as:  PRINIVIL/ZESTRIL   Used for:  Benign essential hypertension        Dose:  20 mg   Take 1 tablet (20 mg)  by mouth 2 times daily   Quantity:  180 tablet   Refills:  1       pantoprazole 40 MG EC tablet   Commonly known as:  PROTONIX   Used for:  Gastroesophageal reflux disease without esophagitis        Dose:  40 mg   Take 1 tablet (40 mg) by mouth daily   Quantity:  90 tablet   Refills:  3       potassium chloride SA 10 MEQ CR tablet   Commonly known as:  K-DUR/KLOR-CON M   Used for:  Hypokalemia        Dose:  20 mEq   Take 2 tablets (20 mEq) by mouth daily   Quantity:  180 tablet   Refills:  1         CONTINUE these medicines which have NOT CHANGED        Dose / Directions    Blood Pressure Monitoring Kit   Used for:  BMI 40.0-44.9, adult (H)        Dose:  1 Device   1 Device daily Pt to check BP readings daily at home   Quantity:  1 kit   Refills:  0                Protect others around you: Learn how to safely use, store and throw away your medicines at www.disposemymeds.org.         Follow-ups after your visit        Your next 10 appointments already scheduled     Aug 10, 2018  8:00 AM CDT   (Arrive by 7:45 AM)   Implantable Loop Recorder with [employer] Cv Device 1   [facility] Care ([facility])    [address] [place] [address]  [address]  [facility] [id number] [phone]            Aug 29, 2018 12:30 PM CDT   Lab with [facility]   [facility] ([facility])    [address] [place] [address]  1st Floor  [facility] [id number] [phone]            Aug 29, 2018  1:30 PM CDT   (Arrive by 1:00 PM)   Return Visit with Vinnie Lloyd MD   [facility] Nephrology ([facility])    [address] [place] [address]  [address]  [facility] [id number] [phone]               Care Instructions        After Care Instructions     Discharge Instructions - Follow up with Device Check RN        Follow up with Device Check RN in 7-10 days.            Discharge Instructions - Keep incision dry for 72 hours       Keep incision dry for 72 hours (unless Derma Perez was applied)                   Further instructions from your care team         Home Care after a Loop Recorder Implant  Wound care:  Check for signs of infection each day.  These include increased redness, swelling, drainage or a fever over 101 F (38.3 C).  Call us immediately if you see any of these signs.  You may shower 24 hours after the procedure.  Do not submerge the incision (in a bath tub, hot tub, or swimming pool) until fully healed.  Do not apply powder or lotion to the incision for 14 days.     Pain:   You may have mild pain for 3 to 5 days.  Take acetaminophen (Tylenol) or ibuprofen (Advil) for the pain.  Call us if the pain is severe or lasts more than 5 days.    Activity:  After 24 hours, slowly return to your normal activities.      Avoid anything that may cause rough contact or a hard hit to your chest.  This includes football, hockey, and other contact sports.    Follow Up Visits:  Return to the clinic in 7 to 10 days to have your loop recorder and wound checked.      Telling others about your device:  Before you have any medical tests or treatments, tell the doctors, dentists, and other care providers about your device.  There are a few tests and treatments that may interfere with your device.  Your care team may need to take special steps to keep you safe.  Before you leave the hospital, you will receive a temporary ID card.  A permanent card will be mailed to you about 6 to 8 weeks later.  Always carry the ID card with you.  It has important details about your device.  Safety near electrical equipment:  All of these are safe to use when in good repair -    Microwaves    Radios    Cordless phone    Remote controls    Small electrical tools  Security clifton: It is okay to walk through security clifton at the airports and department stores. Tell airport security that you have an implanted loop recorder.  Full-body scanners are safe.  Activator: Black and Gray (pictured below). Carry this with you. Press the button  "if you have symptoms.            Home Monitor: White and Blue (pictured below). Plug in at home. Use under device clinic direction.    Questions?  Please call [facility].    General inquiry: [phone]    Device Nurse:          Business Hours:  [phone]                         After Hours:  [phone]   Choose option 4, then ask for the                             on-call device nurse at job code [id number].    Your next device clinic appointment is scheduled on:     ___________8/10/2018________________ at _____7:45 am check in________.          [facility] Heart Care  [facility] and [facility] Center - Clinic [address]  [address] [place] [address]       Additional Information About Your Visit        MyChart Information     "[employer] gives you secure access to your electronic health record. If you see a primary care provider, you can also send messages to your care team and make appointments. If you have questions, please call your primary care clinic.  If you do not have a primary care provider, please call [phone] and they will assist you.        Care EveryWhere ID     This is your Care EveryWhere ID. This could be used by other organizations to access your [place] medical records  [id number]        Your Vitals Were     Blood Pressure Pulse Temperature Respirations Height Weight    111/86 (BP Location: Right arm) 74 98.1  F (36.7  C) (Oral) 16 1.549 m (5' 1\") 111.1 kg (245 lb)    Last Period Pulse Oximetry BMI (Body Mass Index)             (LMP Unknown) 95% 46.29 kg/m2          Primary Care Provider Office Phone # Fax #    Lilli Ivan [phone]      Equal Access to Services     [facility]ON[facility]: Hadii luther Booth, wasteffda luqadaha, qaybta kaalmada adesocorroyapenny, nima sargent. So [facility] [phone].    ATENCIÓN: Si habla español, tiene a ojeda disposición servicios gratuitos de asistencia lingüística. Llame al " [phone].    We comply with applicable federal civil rights laws and [place] laws. We do not discriminate on the basis of race, color, national origin, age, disability, sex, sexual orientation, or gender identity.            Thank you!     Thank you for choosing [facility] for your care. Our goal is always to provide you with excellent care. Hearing back from our patients is one way we can continue to improve our services. Please take a few minutes to complete the written survey that you may receive in the mail after you visit with us. Thank you!             Medication List: This is a list of all your medications and when to take them. Check marks below indicate your daily home schedule. Keep this list as a reference.      Medications           Morning Afternoon Evening Bedtime As Needed    albuterol 108 (90 Base) MCG/ACT Inhaler   Commonly known as:  PROAIR HFA/PROVENTIL HFA/VENTOLIN HFA   Inhale 2 puffs into the lungs every 6 hours as needed for shortness of breath / dyspnea or wheezing                                ALEVE 220 MG capsule   Generic drug:  naproxen sodium                                atorvastatin 20 MG tablet   Commonly known as:  LIPITOR   Take 1 tablet (20 mg) by mouth daily                                Blood Pressure Monitoring Kit   1 Device daily Pt to check BP readings daily at home                                cetirizine 10 MG Chew   Commonly known as:  zyrTEC   Take 1 tablet (10 mg) by mouth as needed                                clopidogrel 75 MG tablet   Commonly known as:  PLAVIX   Take 1 tablet (75 mg) by mouth daily                                DIPHENHYDRAMINE HCL PO   Take 25 mg by mouth nightly as needed                                fluticasone 50 MCG/ACT spray   Commonly known as:  FLONASE ALLERGY RELIEF   Spray 2 sprays into both nostrils daily                                lisinopril 20 MG tablet   Commonly known as:  PRINIVIL/ZESTRIL   Take 1 tablet (20 mg) by  mouth 2 times daily                                pantoprazole 40 MG EC tablet   Commonly known as:  PROTONIX   Take 1 tablet (40 mg) by mouth daily                                potassium chloride SA 10 MEQ CR tablet   Commonly known as:  K-DUR/KLOR-CON M   Take 2 tablets (20 mEq) by mouth daily

## 2018-08-01 NOTE — DISCHARGE INSTRUCTIONS
Home Care after a Loop Recorder Implant  Wound care:  Check for signs of infection each day.  These include increased redness, swelling, drainage or a fever over 101 F (38.3 C).  Call us immediately if you see any of these signs.  You may shower 24 hours after the procedure.  Do not submerge the incision (in a bath tub, hot tub, or swimming pool) until fully healed.  Do not apply powder or lotion to the incision for 14 days.     Pain:   You may have mild pain for 3 to 5 days.  Take acetaminophen (Tylenol) or ibuprofen (Advil) for the pain.  Call us if the pain is severe or lasts more than 5 days.    Activity:  After 24 hours, slowly return to your normal activities.      Avoid anything that may cause rough contact or a hard hit to your chest.  This includes football, hockey, and other contact sports.    Follow Up Visits:  Return to the clinic in 7 to 10 days to have your loop recorder and wound checked.      Telling others about your device:  Before you have any medical tests or treatments, tell the doctors, dentists, and other care providers about your device.  There are a few tests and treatments that may interfere with your device.  Your care team may need to take special steps to keep you safe.  Before you leave the hospital, you will receive a temporary ID card.  A permanent card will be mailed to you about 6 to 8 weeks later.  Always carry the ID card with you.  It has important details about your device.  Safety near electrical equipment:  All of these are safe to use when in good repair -    Microwaves    Radios    Cordless phone    Remote controls    Small electrical tools  Security clifton: It is okay to walk through security clifton at the airports and department stores. Tell airport security that you have an implanted loop recorder.  Full-body scanners are safe.  Activator: Black and Gray (pictured below). Carry this with you. Press the button if you have symptoms.            Home Monitor: White and Blue  (pictured below). Plug in at home. Use under device clinic direction.    Questions?  Please call [facility].    General inquiry: [phone]    Device Nurse:          Business Hours:  [phone]                         After Hours:  [phone]   Choose option 4, then ask for the                             on-call device nurse at job code [id number].    Your next device clinic appointment is scheduled on:     ___________8/10/2018________________ at _____7:45 am check in________.          [facility] Heart Care  Clinics and Surgery Center - Clinic 3N  [address] [place] [address]  [id number]

## 2018-08-01 NOTE — OP NOTE
EP PROCEDURE NOTE    Procedures:  1. Implantable loop recorder implantation.    Attending: Robinson CASTRO Fellow: Rockwell  Procedure Date: 8/1/2018  Procedure performed in:  Cardiac Catheterization Laboratory Room 1    Pre-operative Diagnosis:  Cryptogenic stroke.  Post-operative diagnosis:   Successful implantation of ILR.  Complications: None.     Fluoroscopy time/dose: None  Estimated Blood Loss:  less than 1 cc    Clinical Profile:  Natali Myers is a 58 year old woman with a history of cryptogenic stroke.  We were asked by Dr. Pretty to place an implantable loop recorder to monitor for atrial fibrillation that would alter her medical management.    PROCEDURE  The risks and benefits of the procedure were explained to the patient in full.  The risks of the procedure include, but are not limited to: pain, bleeding, blood transfusion reactions and infection. Informed Consent was obtained. The patient was brought to the EP lab in a fasting and hemodynamically stable condition. The patient was prepped and draped in a sterile fashion. This procedure was done withou sedation.  Sedation: This procedure was performed without sedation.  The chest wall was anesthetized with 2% Lidocaine.  An incision was made around the left parasternal area at the 4th intercostal space 1-2 cm lateral to the the sternal border. The implantable loop recorder was then injected with an introducer into the subcutaneous tissue. The device was then interrogated to ensure adequate sensing.    The pocket was then closed using 4-0 Vicryl for the subcuticular layer andDermabond. Steri-Strips and a dressing were then applied over the incision site..     Dr. Bowers was present throughout the entire procedure.     EQUIPMENT  1. The ILR is a [employer] LINQ LNQ11, Serial [id number].     PROGRAMMING  1. Tachy cut off rate = 171 bpm, 16 beats  2. AF detection = ON.  3. Hiren cut off rate = 30 bpm, 4 beats  4. Asystole Duration = 3.0 sec.     PLAN:  1.  Wound care.

## 2018-08-01 NOTE — PROGRESS NOTES
Prepped for loop recorder placement.  Denies pain.  Transport per self.  Labs pending.  H & P current.  Consent signed.

## 2018-08-10 ENCOUNTER — ALLIED HEALTH/NURSE VISIT (OUTPATIENT)
Dept: CARDIOLOGY | Facility: CLINIC | Age: 58
End: 2018-08-10
Attending: INTERNAL MEDICINE
Payer: COMMERCIAL

## 2018-08-10 DIAGNOSIS — I63.9 ACUTE ISCHEMIC STROKE (H): Primary | ICD-10-CM

## 2018-08-10 DIAGNOSIS — I10 BENIGN ESSENTIAL HYPERTENSION: ICD-10-CM

## 2018-08-10 DIAGNOSIS — E87.6 HYPOKALEMIA: ICD-10-CM

## 2018-08-10 PROCEDURE — 93291 INTERROG DEV EVAL SCRMS IP: CPT | Mod: ZF

## 2018-08-10 NOTE — PATIENT INSTRUCTIONS
It was a pleasure to see you in clinic today. Please do not hesitate to call with any questions or concerns. We look forward to seeing you in clinic at your next device check in 3 months.    Debbie Coates RN  Electrophysiology Nurse Clinician  [facility]  During business hours call:  [phone]  After business hours please call: [phone]- select option #4 and ask for job code [id number].

## 2018-08-10 NOTE — PROGRESS NOTES
Preliminary Device Interrogation Results.  Final physician signed paceart report to be scanned and attached.    Pt seen in the [facility] for evaluation and iterative programming of a Medtronic ILR, per MD orders. She is s/p device implant 8/1/18. Today her intrinsic rhythm is SR 80 bpm. Normal device function. No arrhythmias have been recorded. Pt reports she is feeling well. Incision is covered with dermabond. Edges are well approximated with no redness, drainage, or edema noted. Plan for pt to RTC in 3 months.  Loop recorder

## 2018-08-10 NOTE — MR AVS SNAPSHOT
After Visit Summary   8/10/2018    Natali Myers    MRN: [id number]           Patient Information     Date Of Birth          1960        Visit Information        Provider Department      8/10/2018 8:00 AM 1, Uc Cv Device [facility]        Today's Diagnoses     Acute ischemic stroke (H)    -  1      Care Instructions    It was a pleasure to see you in clinic today. Please do not hesitate to call with any questions or concerns. We look forward to seeing you in clinic at your next device check in 3 months.    Debbie Coates, RN  Electrophysiology Nurse Clinician  [facility]  During business hours call:  [phone]  After business hours please call: [phone]- select option #4 and ask for job code [phone].            Follow-ups after your visit        Additional Services     Follow-Up with Cardiac Advanced Practice Provider           Follow-Up with Device Clinic - 3 months                 Your next 10 appointments already scheduled     Aug 29, 2018 12:30 PM CDT   Lab with GIOVANNY LAB   [facility] ([facility])    [address]  1st Floor  [facility] [address] [phone]            Aug 29, 2018  1:30 PM CDT   (Arrive by 1:00 PM)   Return Visit with Vinnie Lloyd MD   [facility] Nephrology ([facility])    [address]  [address]  [facility] [address] [phone]            Nov 15, 2018  5:30 PM CST   (Arrive by 5:15 PM)   Implantable Loop Recorder with Uc Cv Device 1   [facility] ([facility])    [address]  [address]  [facility] [address] [phone]            Nov 15, 2018  6:15 PM CST   (Arrive by 6:00 PM)   RETURN ARRHYTHMIA with MADY Interiano [facility] ([facility])    [address]  [address]  [facility] [address] [phone]              Future tests that  were ordered for you today     Open Future Orders        Priority Expected Expires Ordered    Follow-Up with Cardiac Advanced Practice Provider Routine 11/5/2018 11/30/2018 8/10/2018    Follow-Up with Device Clinic - 3 months Routine 11/8/2018 2/6/2019 8/10/2018            Who to contact     If you have questions or need follow up information about today's clinic visit or your schedule please contact [facility] directly at [phone].  Normal or non-critical lab and imaging results will be communicated to you by [employer], letter or phone within 4 business days after the clinic has received the results. If you do not hear from us within 7 days, please contact the clinic through [employer] or phone. If you have a critical or abnormal lab result, we will notify you by phone as soon as possible.  Submit refill requests through [employer] or call your pharmacy and they will forward the refill request to us. Please allow 3 business days for your refill to be completed.          Additional Information About Your Visit        [employer] Information     [employer] gives you secure access to your electronic health record. If you see a primary care provider, you can also send messages to your care team and make appointments. If you have questions, please call your primary care clinic.  If you do not have a primary care provider, please call [phone] and they will assist you.        Care EveryWhere ID     This is your Care EveryWhere ID. This could be used by other organizations to access your [place] medical records  [id number]        Your Vitals Were     Last Period                   (LMP Unknown)            Blood Pressure from Last 3 Encounters:   08/01/18 111/86   06/26/18 126/78   04/24/18 122/80    Weight from Last 3 Encounters:   08/01/18 111.1 kg (245 lb)   06/26/18 112.3 kg (247 lb 9.6 oz)   04/24/18 109.8 kg (242 lb)              We Performed the Following     (14587) INTERR DEVICE EVAL IN PERSON, LOOP RECORDER         Primary Care Provider Office Phone # Fax #    Lilli Ivan [phone] [address] [address]        Equal Access to Services     LYNDSEYNKECHI KAREN : Hadii aad ku matto Soregineali, waaxda luqadaha, qaybta kaalmada adeshaunda, nima dunnranulfo arie. So [facility] [phone].    ATENCIÓN: Si habla español, tiene a ojeda disposición servicios gratuitos de asistencia lingüística. Llame al [phone].    We comply with applicable federal civil rights laws and [place] laws. We do not discriminate on the basis of race, color, national origin, age, disability, sex, sexual orientation, or gender identity.            Thank you!     Thank you for choosing [facility]  for your care. Our goal is always to provide you with excellent care. Hearing back from our patients is one way we can continue to improve our services. Please take a few minutes to complete the written survey that you may receive in the mail after your visit with us. Thank you!             Your Updated Medication List - Protect others around you: Learn how to safely use, store and throw away your medicines at www.disposemymeds.org.          This list is accurate as of 8/10/18  8:31 AM.  Always use your most recent med list.                   Brand Name Dispense Instructions for use Diagnosis    albuterol 108 (90 Base) MCG/ACT Inhaler    PROAIR HFA/PROVENTIL HFA/VENTOLIN HFA    1 Inhaler    Inhale 2 puffs into the lungs every 6 hours as needed for shortness of breath / dyspnea or wheezing    Mild intermittent asthma without complication       ALEVE 220 MG capsule   Generic drug:  naproxen sodium           atorvastatin 20 MG tablet    LIPITOR    90 tablet    Take 1 tablet (20 mg) by mouth daily    Cerebrovascular accident (CVA), unspecified mechanism (H)       Blood Pressure Monitoring Kit     1 kit    1 Device daily Pt to check BP readings daily at home    BMI 40.0-44.9, adult (H)        cetirizine 10 MG Chew    zyrTEC    30 tablet    Take 1 tablet (10 mg) by mouth as needed    Chronic allergic rhinitis, unspecified seasonality, unspecified trigger       clopidogrel 75 MG tablet    PLAVIX    30 tablet    Take 1 tablet (75 mg) by mouth daily    Acute CVA (cerebrovascular accident) (H)       DIPHENHYDRAMINE HCL PO      Take 25 mg by mouth nightly as needed        fluticasone 50 MCG/ACT spray    FLONASE ALLERGY RELIEF    1 Bottle    Spray 2 sprays into both nostrils daily    Chronic allergic rhinitis, unspecified seasonality, unspecified trigger       lisinopril 20 MG tablet    PRINIVIL/ZESTRIL    180 tablet    Take 1 tablet (20 mg) by mouth 2 times daily    Benign essential hypertension       pantoprazole 40 MG EC tablet    PROTONIX    90 tablet    Take 1 tablet (40 mg) by mouth daily    Gastroesophageal reflux disease without esophagitis       potassium chloride SA 10 MEQ CR tablet    K-DUR/KLOR-CON M    180 tablet    Take 2 tablets (20 mEq) by mouth daily    Hypokalemia

## 2018-08-13 RX ORDER — LISINOPRIL 20 MG/1
TABLET ORAL
Qty: 180 TABLET | Refills: 1 | Status: SHIPPED | OUTPATIENT
Start: 2018-08-13 | End: 2019-02-08

## 2018-08-13 RX ORDER — POTASSIUM CHLORIDE 750 MG/1
20 TABLET, EXTENDED RELEASE ORAL DAILY
Qty: 180 TABLET | Refills: 0 | Status: SHIPPED | OUTPATIENT
Start: 2018-08-13 | End: 2018-11-10

## 2018-08-22 DIAGNOSIS — I10 ESSENTIAL HYPERTENSION, BENIGN: Primary | ICD-10-CM

## 2018-11-10 DIAGNOSIS — E87.6 HYPOKALEMIA: ICD-10-CM

## 2018-11-12 RX ORDER — POTASSIUM CHLORIDE 750 MG/1
TABLET, FILM COATED, EXTENDED RELEASE ORAL
Qty: 180 TABLET | Refills: 0 | Status: SHIPPED | OUTPATIENT
Start: 2018-11-12 | End: 2019-02-20

## 2018-11-20 ENCOUNTER — OFFICE VISIT (OUTPATIENT)
Dept: INTERNAL MEDICINE | Facility: CLINIC | Age: 58
End: 2018-11-20
Payer: COMMERCIAL

## 2018-11-20 VITALS
HEIGHT: 62 IN | DIASTOLIC BLOOD PRESSURE: 74 MMHG | OXYGEN SATURATION: 96 % | BODY MASS INDEX: 45.86 KG/M2 | SYSTOLIC BLOOD PRESSURE: 112 MMHG | HEART RATE: 98 BPM | WEIGHT: 249.2 LBS

## 2018-11-20 DIAGNOSIS — Z23 NEED FOR INFLUENZA VACCINATION: ICD-10-CM

## 2018-11-20 DIAGNOSIS — G43.109 MIGRAINE WITH AURA AND WITHOUT STATUS MIGRAINOSUS, NOT INTRACTABLE: ICD-10-CM

## 2018-11-20 DIAGNOSIS — R21 RASH: ICD-10-CM

## 2018-11-20 DIAGNOSIS — M79.644 PAIN OF FINGER OF RIGHT HAND: ICD-10-CM

## 2018-11-20 DIAGNOSIS — R05.9 COUGH: Primary | ICD-10-CM

## 2018-11-20 DIAGNOSIS — Z12.39 SCREENING FOR BREAST CANCER: ICD-10-CM

## 2018-11-20 DIAGNOSIS — I63.9 ACUTE CVA (CEREBROVASCULAR ACCIDENT) (H): ICD-10-CM

## 2018-11-20 RX ORDER — MONTELUKAST SODIUM 10 MG/1
10 TABLET ORAL AT BEDTIME
Qty: 30 TABLET | Refills: 1 | Status: SHIPPED | OUTPATIENT
Start: 2018-11-20 | End: 2019-02-09

## 2018-11-20 RX ORDER — TRIAMCINOLONE ACETONIDE 1 MG/G
CREAM TOPICAL 2 TIMES DAILY
Qty: 15 G | Refills: 0 | Status: SHIPPED | OUTPATIENT
Start: 2018-11-20 | End: 2019-02-26

## 2018-11-20 RX ORDER — CLOPIDOGREL BISULFATE 75 MG/1
75 TABLET ORAL DAILY
Qty: 90 TABLET | Refills: 3 | Status: SHIPPED | OUTPATIENT
Start: 2018-11-20 | End: 2019-11-11

## 2018-11-20 NOTE — MR AVS SNAPSHOT
After Visit Summary   11/20/2018    Natali Myers    MRN: [id number]           Patient Information     Date Of Birth          1960        Visit Information        Provider Department      11/20/2018 4:45 PM Lilli Ivan MD [facility]        Today's Diagnoses     Cough    -  1    Acute CVA (cerebrovascular accident) (H)        Pain of finger of right hand        Migraine with aura and without status migrainosus, not intractable        Rash        Screening for breast cancer        Need for influenza vaccination          Care Instructions    Primary Care Center Medication Refill Request Information:  * Please contact your pharmacy regarding ANY request for medication refills.  ** [facility] Prescription Fax = [phone]  * Please allow 3 business days for routine medication refills.  * Please allow 5 business days for controlled substance medication refills.     Primary Care Center Test Result notification information:  *You will be notified with in 7-10 days of your appointment day regarding the results of your test.  If you are on MyChart you will be notified as soon as the provider has reviewed the results and signed off on them.    [facility]: [phone]     1.  Flu shot today  2.  Continue lisinopril 20 mg twice daily; HOLD lisinopril 20 mg in the morning or in the evening for blood pressure less than 100 systolic.    Natali was seen today for recheck medication.    Diagnoses and all orders for this visit:    Cough  -     montelukast (SINGULAIR) 10 MG tablet; Take 1 tablet (10 mg) by mouth At Bedtime    Acute CVA (cerebrovascular accident) (H)  Comments:  1/16/18  Orders:  -     clopidogrel (PLAVIX) 75 MG tablet; Take 1 tablet (75 mg) by mouth daily  -     NEUROLOGY ADULT REFERRAL  -     Lipid Profile FASTING; Future    Pain of finger of right hand  -     PHYSICAL THERAPY REFERRAL; Future    Migraine with aura and without status migrainosus, not intractable  -      NEUROLOGY ADULT REFERRAL    Rash  -     triamcinolone (KENALOG) 0.1 % cream; Apply topically 2 times daily    Screening for breast cancer  -     Mammogram, routine screening; Future                Follow-ups after your visit        Additional Services     NEUROLOGY ADULT REFERRAL       Your provider has referred you for the following:   Consult at [facility]: Neurology Clinic - [place] [phone]   [email or web address]  Stroke/Endovascular    Please be aware that coverage of these services is subject to the terms and limitations of your health insurance plan.  Call member services at your health plan with any benefit or coverage questions.      Please bring the following with you to your appointment:    (1) Any X-Rays, CTs or MRIs which have been performed.  Contact the facility where they were done to arrange for  prior to your scheduled appointment.    (2) List of current medications  (3) This referral request   (4) Any documents/labs given to you for this referral            PHYSICAL THERAPY REFERRAL       If you have not heard from the scheduling office within 2 business days, please call [phone] for all locations, with the exception of [place], please call [phone] and [place], please call [phone].    Please be aware that coverage of these services is subject to the terms and limitations of your health insurance plan.  Call member services at your health plan with any benefit or coverage questions.                  Follow-up notes from your care team     Return in about 3 months (around 2/20/2019) for Physical Exam.      Your next 10 appointments already scheduled     Nov 28, 2018  4:30 PM CST   MA SCREENING DIGITAL BILATERAL with [id number]   [facility] ([facility])    [address] 2nd [address]  [facility] [address] [phone]           How do I prepare for my exam? (Food and drink  "instructions) No Food and Drink Restrictions.  How do I prepare for my exam? (Other instructions) Do not use any powder, lotion or deodorant under your arms or on your breast. If you do, we will ask you to remove it before your exam.  What should I wear: Wear comfortable, two-piece clothing.  How long does the exam take: Most scans will take 15 minutes.  What should I bring: Bring any previous mammograms from other facilities or have them mailed to the breast center.  Do I need a :  No  is needed.  What do I need to tell my doctor: If you have any allergies, tell your care team.  What should I do after the exam: No restrictions, You may resume normal activities.  What is this test: This test is an x-ray of the breast to look for breast disease. The breast is pressed between two plates to flatten and spread the tissue. An X-ray is taken of the breast from different angles.  Who should I call with questions: If you have any questions, please call the Imaging Department where you will have your exam. Directions, parking instructions, and other information is available on our website, [email or web address].  Other information about my exam Three-dimensional (3D) mammograms are available at [place] locations in [facility], [facility], [place], [facility] and [place]. [facility] locations include [place] and the [facility] in [place].  Benefits of 3D mammograms include * Improved rate of cancer detection * Decreases your chance of having to go back for more tests, which means fewer: * \"False-positive\" results (This means that there is an abnormal area but it isn't cancer.) * Invasive testing procedures, such as a biopsy or surgery * Can provide clearer images of the breast if you have dense breast tissue.  *3D mammography is an optional exam that anyone can have with a 2D mammogram. It doesn't replace or take the place of a 2D mammogram. 2D mammograms " "remain an effective screening test for all women.  Not all insurance companies cover the cost of a 3D mammogram. Check with your insurance. Three-dimensional (3D) mammograms are available at [place] locations in [facility], [facility], [place], [place], and [place]. [facility] locations include [place] and [facility] in [place]. Benefits of 3D mammograms include: - Improved rate of cancer detection - Decreases your chance of having to go back for more tests, which means fewer: - \"False-positive\" results (This means that there is an abnormal area but it isn't cancer.) - Invasive testing procedures, such as a biopsy or surgery - Can provide clearer images of the breast if you have dense breast tissue. 3D mammography is an optional exam that anyone can have with a 2D mammogram. It doesn't replace or take the place of a 2D mammogram. 2D mammograms remain an effective screening test for all women.  Not all insurance companies cover the cost of a 3D mammogram. Check with your insurance.            Nov 30, 2018  7:30 AM CST   LAB with  LAB   [facility] ([facility])    [address]  1st Floor  [facility] [id number] [phone]           Please do not eat 10-12 hours before your appointment if you are coming in fasting for labs on lipids, cholesterol, or glucose (sugar). This does not apply to pregnant women. Water, hot tea and black coffee (with nothing added) are okay. Do not drink other fluids, diet soda or chew gum.            Dec 05, 2018  4:00 PM CST   (Arrive by 3:30 PM)   Return Visit with Vinnie Lloyd MD   [facility] Nephrology ([facility])    [address]  [address]  [facility] [address] [phone]            Dec 06, 2018  5:30 PM CST   (Arrive by 5:15 PM)   Implantable Loop Recorder with  Cv Device 1   [facility] ([facility])    [address]  3rd Floor  [address]               Dec 06, 2018  6:00 PM CST   (Arrive by 5:45 PM)   RETURN ARRHYTHMIA with MADY Interiano CNP   [facility] ([facility])    [address]  [address]  [facility] [address]   [phone]            Feb 26, 2019  4:45 PM CST   (Arrive by 4:30 PM)   Return Visit with Lilli JERRY MD   [facility] ([facility])    [address]  4th Floor  [facility] [address] [phone]            Mar 05, 2019 12:20 PM CST   (Arrive by 12:05 PM)   Return Stroke with José Miguel Snyder MD   [facility] Neurology ([facility])    [address]  3rd [facility] [id number] [phone]              Future tests that were ordered for you today     Open Future Orders        Priority Expected Expires Ordered    Mammogram, routine screening Routine  11/20/2019 11/20/2018    PHYSICAL THERAPY REFERRAL Routine  11/20/2019 11/20/2018    Lipid Profile FASTING Routine 2/20/2019 11/20/2019 11/20/2018            Who to contact     Please call your clinic at [phone] to:    Ask questions about your health    Make or cancel appointments    Discuss your medicines    Learn about your test results    Speak to your doctor            Additional Information About Your Visit        [employer] Information     [employer] gives you secure access to your electronic health record. If you see a primary care provider, you can also send messages to your care team and make appointments. If you have questions, please call your primary care clinic.  If you do not have a primary care provider, please call [phone] and they will assist you.      [employer] is an electronic gateway that provides easy, online access to your medical records. With [employer], you can request a clinic appointment, read your test results, renew a prescription or communicate with your  "care team.     To access your existing account, please contact your [facility] or call [phone] for assistance.        Care EveryWhere ID     This is your Care EveryWhere ID. This could be used by other organizations to access your [place] medical records  [id number]        Your Vitals Were     Pulse Height Last Period Pulse Oximetry BMI (Body Mass Index)       98 1.562 m (5' 1.5\") (LMP Unknown) 96% 46.32 kg/m2        Blood Pressure from Last 3 Encounters:   11/20/18 112/74   08/01/18 111/86   06/26/18 126/78    Weight from Last 3 Encounters:   11/20/18 113 kg (249 lb 3.2 oz)   08/01/18 111.1 kg (245 lb)   06/26/18 112.3 kg (247 lb 9.6 oz)              We Performed the Following     HC FLU VAC PRESRV FREE QUAD SPLIT VIR 3+YRS IM     NEUROLOGY ADULT REFERRAL          Today's Medication Changes          These changes are accurate as of 11/20/18  5:57 PM.  If you have any questions, ask your nurse or doctor.               Start taking these medicines.        Dose/Directions    montelukast 10 MG tablet   Commonly known as:  SINGULAIR   Used for:  Cough   Started by:  Lilli Ivan MD        Dose:  10 mg   Take 1 tablet (10 mg) by mouth At Bedtime   Quantity:  30 tablet   Refills:  1       triamcinolone 0.1 % cream   Commonly known as:  KENALOG   Used for:  Rash   Started by:  Lilli Ivan MD        Apply topically 2 times daily   Quantity:  15 g   Refills:  0            Where to get your medicines      These medications were sent to [facility]/pharmacy #[id number] - [place], [place] - [address]  [address], [place] [address]     Phone:  [phone]     clopidogrel 75 MG tablet    montelukast 10 MG tablet    triamcinolone 0.1 % cream                Primary Care Provider Office Phone # Fax #    Lilli JERRY [phone]       [address] [address]        Equal Access to Services     KELLIE JONES AH: Roberto de leon " Juventinoali, julietda luqadaha, qajoesta kaderrick pacheco, nima sargent. So [facility] [phone].    ATENCIÓN: Si ese lynch, tiene a ojeda disposición servicios gratuitos de asistencia lingüística. Bijan al [phone].    We comply with applicable federal civil rights laws and [place] laws. We do not discriminate on the basis of race, color, national origin, age, disability, sex, sexual orientation, or gender identity.            Thank you!     Thank you for choosing [facility]  for your care. Our goal is always to provide you with excellent care. Hearing back from our patients is one way we can continue to improve our services. Please take a few minutes to complete the written survey that you may receive in the mail after your visit with us. Thank you!             Your Updated Medication List - Protect others around you: Learn how to safely use, store and throw away your medicines at www.disposemymeds.org.          This list is accurate as of 11/20/18  5:57 PM.  Always use your most recent med list.                   Brand Name Dispense Instructions for use Diagnosis    albuterol 108 (90 Base) MCG/ACT inhaler    PROAIR HFA/PROVENTIL HFA/VENTOLIN HFA    1 Inhaler    Inhale 2 puffs into the lungs every 6 hours as needed for shortness of breath / dyspnea or wheezing    Mild intermittent asthma without complication       ALEVE 220 MG capsule   Generic drug:  naproxen sodium           atorvastatin 20 MG tablet    LIPITOR    90 tablet    Take 1 tablet (20 mg) by mouth daily    Cerebrovascular accident (CVA), unspecified mechanism (H)       Blood Pressure Monitoring Kit     1 kit    1 Device daily Pt to check BP readings daily at home    BMI 40.0-44.9, adult (H)       cetirizine 10 MG Chew    zyrTEC    30 tablet    Take 1 tablet (10 mg) by mouth as needed    Chronic allergic rhinitis, unspecified seasonality, unspecified trigger       clopidogrel 75 MG tablet    PLAVIX    90  tablet    Take 1 tablet (75 mg) by mouth daily    Acute CVA (cerebrovascular accident) (H)       DIPHENHYDRAMINE HCL PO      Take 25 mg by mouth nightly as needed        fluticasone 50 MCG/ACT spray    FLONASE ALLERGY RELIEF    1 Bottle    Spray 2 sprays into both nostrils daily    Chronic allergic rhinitis, unspecified seasonality, unspecified trigger       KLOR-CON 10 MEQ tablet   Generic drug:  potassium chloride     180 tablet    TAKE 2 TABLETS (20 MEQ) BY MOUTH DAILY    Hypokalemia       lisinopril 20 MG tablet    PRINIVIL/ZESTRIL    180 tablet    TAKE 1 TABLET (20 MG) BY MOUTH 2 TIMES DAILY    Benign essential hypertension       montelukast 10 MG tablet    SINGULAIR    30 tablet    Take 1 tablet (10 mg) by mouth At Bedtime    Cough       pantoprazole 40 MG EC tablet    PROTONIX    90 tablet    Take 1 tablet (40 mg) by mouth daily    Gastroesophageal reflux disease without esophagitis       triamcinolone 0.1 % cream    KENALOG    15 g    Apply topically 2 times daily    Rash

## 2018-11-20 NOTE — NURSING NOTE
Chief Complaint   Patient presents with     Recheck Medication     pt is here for a general follow up and would like to discuss her medications with the provider     Natali Myers    1. Has the patient received the information for the influenza vaccine? YES    2.  Does the patient have any of the following contraindications?  Allergy to to eggs? No  Allergic reaction to previous influenza vaccines?  No  Any other problems to previous influenza vaccines? No  Paralyzed by Guillain-Petaluma syndrome? No  Currently pregnant? NO  Current moderate or severe illness?  No  Allergy to contact lens solution?  No    3. The vaccine has been administered in the usual fashion and the patient was instructed to wait 20 minutes before leaving the building in the even of an allergic reaction: YES    Recorded by Millicent Day, Clinic EMT at 4:46 PM on 11/20/2018

## 2018-11-20 NOTE — PROGRESS NOTES
"[facility]  Primary Care Center   Lilli Ivan MD  11/20/2018      Chief Complaint:   Recheck Medication       History of Present Illness:   Natali Myers is a 58 year old female with a history of CVA in January 2018, hypertension, and migraines who presents for evaluation of hand pain, migraines, a cough, and itching on the breast .     Wrist/Joint pain: Right 5th finger is painful following trauma to the area in September, she \"stubbed\" it on something. This makes typing and grabbing things painful. As the week goes on the typing makes her joint more tender when it happened the whole hand was black and blue with bruising. There is also tenderness to palpation on the radial side of her wrist.     Migraines and CVA: In January 2018 she presented with left arm and leg weakness as a stroke.  She underwent right cerebral thrombectomy which resolved her Sx and in retrospect, her chronic migraine headaches. She did not have a migraine until last weekend. The recent one took her down for the whole day, starting with an aura and flashing in her eyes. She did not feel anything in her head at first and was suddenly hit with a \"freight train\". No nausea, focal neurological deficits, weakness. She did have difficulty focusing. She took tylenol with caffeine, chocolate, and a diet pepsi, which did not help. She did not have any migraine medications on hand. This migraine was different than jewel. She woke up the next day normal. She denies increased stress that week. She has not experienced a TIA. At home blood pressure is 108-110 systolic. She did have some SBPs in the 89-95 range associated with light headedness. She takes 20 mg Lisinopril twice daily. She is on Plavix and atorvastatin for stroke prevention.     Cough: She has a dry cough for 3 weeks. She had a cold a month ago but those sx have resolved.  Dry throat this last week, but not scratchy or painful.  NO fever, SOB, wheezing.    Breast itching: There " is an itching spot on her left nipple that has been present for months. She has tried cortisone cream and lotion without relief. She as not noticed discharge.    Healthcare/Maintenance topics discussed:  1.she would llke a flu shot today    Review of Systems:   Pertinent items are noted in HPI, remainder of complete ROS is negative.      Active Medications:     Current Outpatient Prescriptions:      albuterol (PROAIR HFA/PROVENTIL HFA/VENTOLIN HFA) 108 (90 BASE) MCG/ACT Inhaler, Inhale 2 puffs into the lungs every 6 hours as needed for shortness of breath / dyspnea or wheezing, Disp: 1 Inhaler, Rfl: 1     atorvastatin (LIPITOR) 20 MG tablet, Take 1 tablet (20 mg) by mouth daily, Disp: 90 tablet, Rfl: 3     Blood Pressure Monitoring KIT, 1 Device daily Pt to check BP readings daily at home, Disp: 1 kit, Rfl: 0     cetirizine (ZYRTEC) 10 MG CHEW, Take 1 tablet (10 mg) by mouth as needed, Disp: 30 tablet, Rfl: 3     clopidogrel (PLAVIX) 75 MG tablet, Take 1 tablet (75 mg) by mouth daily, Disp: 90 tablet, Rfl: 3     DIPHENHYDRAMINE HCL PO, Take 25 mg by mouth nightly as needed , Disp: , Rfl:      fluticasone (FLONASE ALLERGY RELIEF) 50 MCG/ACT spray, Spray 2 sprays into both nostrils daily, Disp: 1 Bottle, Rfl: 3     KLOR-CON 10 MEQ tablet, TAKE 2 TABLETS (20 MEQ) BY MOUTH DAILY, Disp: 180 tablet, Rfl: 0     lisinopril (PRINIVIL/ZESTRIL) 20 MG tablet, TAKE 1 TABLET (20 MG) BY MOUTH 2 TIMES DAILY, Disp: 180 tablet, Rfl: 1     montelukast (SINGULAIR) 10 MG tablet, Take 1 tablet (10 mg) by mouth At Bedtime, Disp: 30 tablet, Rfl: 1     naproxen sodium (ALEVE) 220 MG capsule, , Disp: , Rfl:      pantoprazole (PROTONIX) 40 MG EC tablet, Take 1 tablet (40 mg) by mouth daily, Disp: 90 tablet, Rfl: 3     triamcinolone (KENALOG) 0.1 % cream, Apply topically 2 times daily, Disp: 15 g, Rfl: 0     [DISCONTINUED] clopidogrel (PLAVIX) 75 MG tablet, Take 1 tablet (75 mg) by mouth daily, Disp: 30 tablet, Rfl: 11      Allergies:   Mold;  "Seasonal allergies; Codeine; Latex; No clinical screening - see comments; and Penicillins      Past Medical History:  Osteoarthritis of left hip  Asthma with bronchitis  GERD (gastroesophageal reflux disease)  Chronic headaches  Hypertension  Migraines  Mitral stenosis  Seasonal allergies  Stroke  Anemia  Cardiovascular screening LDL goal < 160     Past Surgical History:  Dilatation and curettage  Total abdominal hysterectomy  Release carpal tunnel   Knee surgery, bilateral   Wrist surgery    Family History:   Mother: diabetes mellitus, ITP, kidney disease, cancer  Father: cardiovascular, cerebrovascular disease, alcohol/drug, dementia  Maternal grandmother: diabetes mellitus, thyroid disease  Paternal grandmother: cancer, dementia  Brother: asthma  Son: multiple sclerosis      Social History:     Non smoker    Physical Exam:   /74  Pulse 98  Ht 1.562 m (5' 1.5\")  Wt 113 kg (249 lb 3.2 oz)  LMP  (LMP Unknown)  SpO2 96%  BMI 46.32 kg/m2   Constitutional: Healthy, alert, no distress and cooperative  Head: Normocephalic. No masses, lesions, tenderness or abnormalities  Eyes: PERRL, no conjunctival injection  ENT: Bilateral TM normal without fluid or infection, throat normal without erythema or exudate, no cervical lymphadenopathy  Cardiovascular: RRR, No clicks, rubs, or gallops. Late diastolic 2/6 murmur. No pretibial edema.  No carotid bruits.  Respiratory: Clear to auscultation bilaterally.   Extremities: Right wrist and fingers have full AROM.  +finkelstein's test on right.  No CMC jt tenderness or effusion. Tenderness on right 5th metacarpophalangeal joint.   Neurologic:  Gait normal. 4+ left triceps and brachioradialis reflexes. 2+ right triceps and brachioradialis reflexes, patellar 2+ bilaterally. Strength is intact bilaterally in upper and lower extremities. Cranial nerves II through XII are intact.  Romberg test is negative. Pronator drift negative, no tremor.  Psychiatric: Mentation " appears normal and affect normal  Hematologic/Lymphatic/Immunologic: Normal cervical, anterior, posterior, submandibular, submental, and supraclavicular  lymph nodes  Breasts: No left breast masses or axillary nodes. Color and sizes of nipples are the same bilaterally. Left areolar 2 mm diam mac pap dry patch, not red or swollen. No discharge.       Assessment and Plan:  Natali Myers is a 58 year old female with a history of CVA, hypertension, and migraines who presents for evaluation of hand pain, migraines, a cough, and itching on the breast.    H/O Acute CVA (cerebrovascular accident) (H)  CVA 1/31/18 treated with TPA and thrombectomy and currently anticoagulated on Plavix. An order for annual lipids was placed for her to complete before follow up in January. She had an episode with recurrence X 1 of migraine headache but denied focal neurologic deficits at the time and neuro exam was normal today with exception of hyperreflexia in the left upper extremity. We discussed that this is concerning but we can monitor it for the time being along with a neurology follow up.   - clopidogrel (PLAVIX) 75 MG tablet  Dispense: 90 tablet; Refill: 3  - NEUROLOGY ADULT REFERRAL  - Lipid Profile FASTING    Migraine with aura and without status migrainosus, not intractable  Recent migraine was different than previously, we will have her see neuro for this.   - NEUROLOGY ADULT REFERRAL    Need for influenza vaccination  - HC FLU VAC PRESRV FREE QUAD SPLIT VIR 3+YRS IM    Hand and wrist pain   Recommended she try a wrist or thumb splint in her finger. She is unable to dictate at work. She will also see hand therapy for both the pinky and wrist pain. It may be necessary for her to see orthopedics for the wrist pain to have the tendon injected with steroids.   - PHYSICAL THERAPY REFERRAL    Hypertension   Important to control well and she is monitoring her BP 3-4 times per day.  She has a couple episodes of hypotension. Since  her blood pressure is otherwise well controlled she agreed to hold her evening dose of Lisinopril if below 100 systolic over reducing her daily dose.  ALternative of reducing to 20 mg daily discussed.     Cough  Normal on exam today. We will go ahead with an empiric trial of Singulair to relieve her dry cough. She was instructed to follow up if cough persists in a couple weeks.   - montelukast (SINGULAIR) 10 MG tablet  Dispense: 30 tablet; Refill: 1    Breast rash  Exam is c/w atopic dermatitis.  doubt Paget/'s  She is due for a renewed mammogram so we will have her complete this. To relieve the itching we will try moderate strength Kenalog.   - Mammogram, routine screening  - triamcinolone (KENALOG) 0.1 % cream  Dispense: 15 g; Refill: 0    Follow-up: Return in about 3 months (around 2/20/2019) for Physical Exam.         Scribe Disclosure:   I, Adam Reyna, am serving as a scribe to document services personally performed by Lilli Ivan MD at this visit, based upon the provider's statements to me. All documentation has been reviewed by the aforementioned provider prior to being entered into the official medical record.     Portions of this medical record were completed by a scribe. UPON MY REVIEW AND AUTHENTICATION BY ELECTRONIC SIGNATURE, this confirms (a) I performed the applicable clinical services, and (b) the record is accurate.     Lilli Ivan MD

## 2018-11-21 DIAGNOSIS — I63.9 CEREBROVASCULAR ACCIDENT (CVA), UNSPECIFIED MECHANISM (H): ICD-10-CM

## 2018-11-23 RX ORDER — ATORVASTATIN CALCIUM 20 MG/1
20 TABLET, FILM COATED ORAL DAILY
Qty: 90 TABLET | Refills: 1 | Status: SHIPPED | OUTPATIENT
Start: 2018-11-23 | End: 2019-05-18

## 2018-11-28 ENCOUNTER — RADIANT APPOINTMENT (OUTPATIENT)
Dept: MAMMOGRAPHY | Facility: CLINIC | Age: 58
End: 2018-11-28
Attending: INTERNAL MEDICINE
Payer: COMMERCIAL

## 2018-11-28 DIAGNOSIS — Z12.39 SCREENING FOR BREAST CANCER: ICD-10-CM

## 2018-11-30 DIAGNOSIS — I10 BENIGN ESSENTIAL HYPERTENSION: ICD-10-CM

## 2018-11-30 DIAGNOSIS — I10 ESSENTIAL HYPERTENSION, BENIGN: ICD-10-CM

## 2018-11-30 DIAGNOSIS — I63.9 ACUTE CVA (CEREBROVASCULAR ACCIDENT) (H): ICD-10-CM

## 2018-11-30 LAB
ALBUMIN SERPL-MCNC: 3.7 G/DL (ref 3.4–5)
ALBUMIN UR-MCNC: NEGATIVE MG/DL
ANION GAP SERPL CALCULATED.3IONS-SCNC: 7 MMOL/L (ref 3–14)
APPEARANCE UR: CLEAR
BACTERIA #/AREA URNS HPF: ABNORMAL /HPF
BILIRUB UR QL STRIP: NEGATIVE
BUN SERPL-MCNC: 12 MG/DL (ref 7–30)
CALCIUM SERPL-MCNC: 8.6 MG/DL (ref 8.5–10.1)
CHLORIDE SERPL-SCNC: 103 MMOL/L (ref 94–109)
CHOLEST SERPL-MCNC: 127 MG/DL
CO2 SERPL-SCNC: 28 MMOL/L (ref 20–32)
COLOR UR AUTO: ABNORMAL
CREAT SERPL-MCNC: 0.89 MG/DL (ref 0.52–1.04)
CREAT UR-MCNC: 30 MG/DL
GFR SERPL CREATININE-BSD FRML MDRD: 65 ML/MIN/1.7M2
GLUCOSE SERPL-MCNC: 112 MG/DL (ref 70–99)
GLUCOSE UR STRIP-MCNC: NEGATIVE MG/DL
HDLC SERPL-MCNC: 48 MG/DL
HGB UR QL STRIP: NEGATIVE
KETONES UR STRIP-MCNC: NEGATIVE MG/DL
LDLC SERPL CALC-MCNC: 46 MG/DL
LEUKOCYTE ESTERASE UR QL STRIP: NEGATIVE
MUCOUS THREADS #/AREA URNS LPF: PRESENT /LPF
NITRATE UR QL: NEGATIVE
NONHDLC SERPL-MCNC: 79 MG/DL
PH UR STRIP: 6 PH (ref 5–7)
PHOSPHATE SERPL-MCNC: 2.9 MG/DL (ref 2.5–4.5)
POTASSIUM SERPL-SCNC: 4 MMOL/L (ref 3.4–5.3)
PROT UR-MCNC: <0.05 G/L
PROT/CREAT 24H UR: NORMAL G/G CR (ref 0–0.2)
RBC #/AREA URNS AUTO: <1 /HPF (ref 0–2)
SODIUM SERPL-SCNC: 138 MMOL/L (ref 133–144)
SOURCE: ABNORMAL
SP GR UR STRIP: 1.01 (ref 1–1.03)
SQUAMOUS #/AREA URNS AUTO: 1 /HPF (ref 0–1)
TRIGL SERPL-MCNC: 166 MG/DL
UROBILINOGEN UR STRIP-MCNC: 0 MG/DL (ref 0–2)
WBC #/AREA URNS AUTO: 1 /HPF (ref 0–5)

## 2018-12-02 ASSESSMENT — ENCOUNTER SYMPTOMS
WEAKNESS: 0
MUSCLE CRAMPS: 0
NAIL CHANGES: 0
SPEECH CHANGE: 0
ARTHRALGIAS: 1
LOSS OF CONSCIOUSNESS: 0
TREMORS: 0
MYALGIAS: 0
NECK PAIN: 0
TINGLING: 0
DIZZINESS: 0
JOINT SWELLING: 0
DISTURBANCES IN COORDINATION: 0
BACK PAIN: 0
HEADACHES: 1
PARALYSIS: 0
POOR WOUND HEALING: 0
SKIN CHANGES: 0
STIFFNESS: 1
NUMBNESS: 0
SEIZURES: 0
MUSCLE WEAKNESS: 0
MEMORY LOSS: 0

## 2018-12-04 ENCOUNTER — THERAPY VISIT (OUTPATIENT)
Dept: OCCUPATIONAL THERAPY | Facility: CLINIC | Age: 58
End: 2018-12-04
Payer: COMMERCIAL

## 2018-12-04 DIAGNOSIS — M79.644 PAIN OF FINGER OF RIGHT HAND: ICD-10-CM

## 2018-12-04 PROCEDURE — 97165 OT EVAL LOW COMPLEX 30 MIN: CPT | Mod: GO | Performed by: OCCUPATIONAL THERAPIST

## 2018-12-04 PROCEDURE — 97110 THERAPEUTIC EXERCISES: CPT | Mod: GO | Performed by: OCCUPATIONAL THERAPIST

## 2018-12-04 PROCEDURE — 97760 ORTHOTIC MGMT&TRAING 1ST ENC: CPT | Mod: GO | Performed by: OCCUPATIONAL THERAPIST

## 2018-12-04 NOTE — PROGRESS NOTES
Hand Therapy Initial Evaluation    Current Date:  12/4/2018    Diagnosis: R small finger pain  DOI: 09/08/18   Procedure:  none  Post:  12w 3d    Precautions: neoprene    Subjective:  Natali Myers is a 58 year old right hand dominant female.    Patient reports symptoms of pain, stiffness/loss of motion, weakness/loss of strength, edema, numbness and tingling  of the right small finger which occurred due to tripped over something in the garage and jammed it on car lund (in fist). Since onset symptoms are Gradually getting better.  Special tests:  none.  Previous treatment: finger splint, ice.    General health as reported by patient is good.  Pertinent medical history includes:High Blood Pressure, Implated Device, Migraines/Headaches, Overweight, Stroke  Medical allergies:cediene, penicillin, neoprene, rubber.  Surgical history: orthopedic: CTR (bilateral).  Medication history: Anti-inflammatory, High Blood Pressure, collesterol.    Occupational Profile Information:  Current occupation is Tech   Currently working in normal job without restrictions  Job Tasks: Computer Work, Driving, Prolonged Sitting, Repetitive Tasks  Prior functional level:  no limitations  Barriers include:none  Mobility: No difficulty  Transportation: drives  Leisure activities/hobbies: reading    Functional Outcome Measure:   Upper Extremity Functional Index Score:  SCORE:   Column Totals: /80: 74   (A lower score indicates greater disability.)    Objective:  Pain Level Report  VAS(0-10) 12/4/2018   At Rest: 4/10   At worst: 4/10     Report of Pain:  Location:  small MCP finger  Pain Quality:  Aching and Dull  Frequency: constant    Pain is worst:  daytime  Exacerbated by:  Pressure, moving it  Relieved by:  NSAIDs and rest  Progression:  Slow improvement  Finger Edema  Circumference:  (Measured in cm)    12/4/2018   Location: left right   P1 4.9 5.3   PIP 4.5 4.8   P2 4.3 4.6     Sensation: WNL throughout all nerve  distributions; per patient report.  Sometimes gets numbness/tingling, but not often    ROM:  Pain Report:  - none    + mild    ++ moderate    +++ severe     Small Finger 12/4/2018   AROM(PROM) right   MCP 0/80   PIP 0/88   DIP 0/82   LUNDBERG 250     Strength:   (Measured in pounds)  Pain Report:  - none    + mild    ++ moderate    +++ severe     12/4/2018   Trials left right   1  2  3 54  51  41 49  52  51   Average: 49 51     Assessment:  Patient presents with symptoms consistent with diagnosis of R small finger pain,  with conservative intervention.     Patient's limitations or Problem List includes:  Pain, Decreased ROM/motion, Increased edema and Weakness of the right hand which interferes with the patient's ability to perform Self Care Tasks (dressing, eating, bathing, hygiene/toileting), Work Tasks, Sleep Patterns, Recreational Activities, Household Chores and Driving  as compared to previous level of function.    Rehab Potential:  Good - Return to full activity, some limitations    Patient will benefit from skilled Occupational Therapy to increase ROM and overall strength and decrease pain and edema to return to previous activity level and resume normal daily tasks and to reach their rehab potential.    Barriers to Learning:  No barrier    Communication Issues:  Patient appears to be able to clearly communicate and understand verbal and written communication and follow directions correctly.    Chart Review: Simple history review with patient    Identified Performance Deficits: bathing/showering, toileting, dressing, feeding, hygiene and grooming and work    Assessment of Occupational Performance:  3-5 Performance Deficits    Clinical Decision Making (Complexity): Low complexity    Treatment Explanation:  The following has been discussed with the patient:  RX ordered/plan of care  Anticipated outcomes  Possible risks and side effects    Plan:  Frequency:  1 X week, once daily  Duration:  for 4 weeks tapering  to 2 X a month over 8 weeks    Treatment Plan:   Modalities:  US, Fluidotherapy and Paraffin  Therapeutic Exercise:  AROM, AAROM, PROM, Tendon Gliding and Blocking  Manual Techniques:  Myofascial release  Orthotic Fabrication:  Hand based intrinsic plus orthosis  Self Care:  Self Care Tasks  Discharge Plan:  Achieve all LTG.  Independent in home treatment program.  Reach maximal therapeutic benefit.    Home Exercise Program:  Hand based intrinsic plus orthosis including ring and small fingers  Tendon gliding  icing    Next Visit:  Orthosis modifications  A/AA/PROM  MFR

## 2018-12-04 NOTE — MR AVS SNAPSHOT
After Visit Summary   12/4/2018    Natali Myers    MRN: [id number]           Patient Information     Date Of Birth          1960        Visit Information        Provider Department      12/4/2018 5:30 PM Coleen Ham OT  [facility]        Today's Diagnoses     Pain of finger of right hand           Follow-ups after your visit        Your next 10 appointments already scheduled     Dec 06, 2018  5:30 PM CST   (Arrive by 5:15 PM)   Implantable Loop Recorder with UC CV DEVICE 1   [facility] ([facility])    [address] [place] [address]  [id number]               Dec 06, 2018  6:00 PM CST   (Arrive by 5:45 PM)   RETURN ARRHYTHMIA with MADY [facility] ([facility])    [address] [place] [id number] [phone]            Dec 13, 2018  5:30 PM CST   EBENEZER Hand with Ana Paula Munroe OT   [facility] ([facility])    [address] [place] [id number] [phone]            Dec 20, 2018  5:30 PM CST   EBENEZER Hand with Ana Paula Munroe OT   [facility] ([facility])    [address] [place] [id number] [phone]            Jan 03, 2019  6:00 PM CST   EBENEZER Hand with Ana Paula Munroe OT   [facility] ([facility])    [address] [place] [address] [id number] [phone]            Jan 08, 2019  2:50 PM CST   (Arrive by 2:35 PM)   Return Stroke with José Miguel Snyder MD   [facility] ([facility])    [address] [place] [id number] [phone]            Feb 26, 2019  4:45 PM CST   (Arrive by 4:30 PM)   Return Visit with Lilli JERRY MD   [facility] ([facility])    [address] [place] [address]  [address]  [address] Floor  [facility] [phone] [phone]            Mar 06, 2019 12:00 AM CST   CARDIAC DEVICE CHECK - REMOTE with UC ICD REMOTE   [facility] ([facility])    [address]  [address]  [facility] [id number] [phone]              Who to contact     If you have questions or need follow up information about today's clinic visit or your schedule please contact [facility] directly at [phone].  Normal or non-critical lab and imaging results will be communicated to you by Grazehart, letter or phone within 4 business days after the clinic has received the results. If you do not hear from us within 7 days, please contact the clinic through Ultracellt or phone. If you have a critical or abnormal lab result, we will notify you by phone as soon as possible.  Submit refill requests through PlaceSpeak or call your pharmacy and they will forward the refill request to us. Please allow 3 business days for your refill to be completed.          Additional Information About Your Visit        GrazeharColizer Information     PlaceSpeak gives you secure access to your electronic health record. If you see a primary care provider, you can also send messages to your care team and make appointments. If you have questions, please call your primary care clinic.  If you do not have a primary care provider, please call [phone] and they will assist you.        Care EveryWhere ID     This is your Care EveryWhere ID. This could be used by other organizations to access your [place] medical records  [id number]        Your Vitals Were     Last Period                   (LMP Unknown)            Blood Pressure from Last 3 Encounters:   12/05/18 94/66   11/20/18 112/74   08/01/18 111/86    Weight from Last 3 Encounters:   12/05/18 112.9 kg (249 lb)   11/20/18 113 kg (249 lb 3.2 oz)   08/01/18 111.1 kg (245 lb)              We Performed the Following     HC OT EVAL, LOW COMPLEXITY      ORTHOTIC MGMT AND TRAINING, EACH 15 MIN     PHYSICAL THERAPY REFERRAL     THERAPEUTIC EXERCISES        Primary Care Provider Office Phone # Fax #    Lilli JERRY [phone]       [address] [address]        Equal Access to Services     LYNDSEYNKECHI KAREN : Hadii luther clifford hadnieveso Soomaali, waaxda luqadaha, qaybta kaalmada adeegyada, nima leticiain hayaan bladesocorro thacker hussein sargent. So [facility] [phone].    ATENCIÓN: Si habla español, tiene a ojeda disposición servicios gratuitos de asistencia lingüística. Llame al [phone].    We comply with applicable federal civil rights laws and [place] laws. We do not discriminate on the basis of race, color, national origin, age, disability, sex, sexual orientation, or gender identity.            Thank you!     Thank you for choosing [facility]  for your care. Our goal is always to provide you with excellent care. Hearing back from our patients is one way we can continue to improve our services. Please take a few minutes to complete the written survey that you may receive in the mail after your visit with us. Thank you!             Your Updated Medication List - Protect others around you: Learn how to safely use, store and throw away your medicines at www.disposemymeds.org.          This list is accurate as of 12/4/18 11:59 PM.  Always use your most recent med list.                   Brand Name Dispense Instructions for use Diagnosis    albuterol 108 (90 Base) MCG/ACT inhaler    PROAIR HFA/PROVENTIL HFA/VENTOLIN HFA    1 Inhaler    Inhale 2 puffs into the lungs every 6 hours as needed for shortness of breath / dyspnea or wheezing    Mild intermittent asthma without complication       ALEVE 220 MG capsule   Generic drug:  naproxen sodium           atorvastatin 20 MG tablet    LIPITOR    90 tablet    Take 1 tablet (20 mg) by mouth daily    Cerebrovascular accident (CVA), unspecified mechanism (H)       Blood Pressure Monitoring Kit      1 kit    1 Device daily Pt to check BP readings daily at home    BMI 40.0-44.9, adult (H)       cetirizine 10 MG Chew    zyrTEC    30 tablet    Take 1 tablet (10 mg) by mouth as needed    Chronic allergic rhinitis, unspecified seasonality, unspecified trigger       clopidogrel 75 MG tablet    PLAVIX    90 tablet    Take 1 tablet (75 mg) by mouth daily    Acute CVA (cerebrovascular accident) (H)       DIPHENHYDRAMINE HCL PO      Take 25 mg by mouth nightly as needed        fluticasone 50 MCG/ACT nasal spray    FLONASE ALLERGY RELIEF    1 Bottle    Spray 2 sprays into both nostrils daily    Chronic allergic rhinitis, unspecified seasonality, unspecified trigger       KLOR-CON 10 MEQ CR tablet   Generic drug:  potassium chloride ER     180 tablet    TAKE 2 TABLETS (20 MEQ) BY MOUTH DAILY    Hypokalemia       lisinopril 20 MG tablet    PRINIVIL/ZESTRIL    180 tablet    TAKE 1 TABLET (20 MG) BY MOUTH 2 TIMES DAILY    Benign essential hypertension       montelukast 10 MG tablet    SINGULAIR    30 tablet    Take 1 tablet (10 mg) by mouth At Bedtime    Cough       pantoprazole 40 MG EC tablet    PROTONIX    90 tablet    Take 1 tablet (40 mg) by mouth daily    Gastroesophageal reflux disease without esophagitis       triamcinolone 0.1 % external cream    KENALOG    15 g    Apply topically 2 times daily    Rash

## 2018-12-05 ENCOUNTER — OFFICE VISIT (OUTPATIENT)
Dept: NEPHROLOGY | Facility: CLINIC | Age: 58
End: 2018-12-05
Attending: COLON & RECTAL SURGERY
Payer: COMMERCIAL

## 2018-12-05 VITALS
HEART RATE: 105 BPM | HEIGHT: 62 IN | SYSTOLIC BLOOD PRESSURE: 94 MMHG | DIASTOLIC BLOOD PRESSURE: 66 MMHG | OXYGEN SATURATION: 95 % | BODY MASS INDEX: 45.82 KG/M2 | WEIGHT: 249 LBS

## 2018-12-05 DIAGNOSIS — I10 BENIGN ESSENTIAL HYPERTENSION: Primary | ICD-10-CM

## 2018-12-05 DIAGNOSIS — E87.6 HYPOKALEMIA: ICD-10-CM

## 2018-12-05 PROBLEM — M79.644 PAIN OF FINGER OF RIGHT HAND: Status: ACTIVE | Noted: 2018-12-05

## 2018-12-05 PROCEDURE — G0463 HOSPITAL OUTPT CLINIC VISIT: HCPCS | Mod: ZF

## 2018-12-05 ASSESSMENT — PAIN SCALES - GENERAL: PAINLEVEL: NO PAIN (0)

## 2018-12-05 NOTE — NURSING NOTE
"Chief Complaint   Patient presents with     RECHECK     benign essential hypertension     BP 94/66  Pulse 105  Ht 1.562 m (5' 1.5\")  Wt 112.9 kg (249 lb)  LMP  (LMP Unknown)  SpO2 95%  BMI 46.29 kg/m2  Oumou Chester CMA    "

## 2018-12-05 NOTE — MR AVS SNAPSHOT
After Visit Summary   12/5/2018    Natali Myers    MRN: [id number]           Patient Information     Date Of Birth          1960        Visit Information        Provider Department      12/5/2018 4:00 PM Vinnie Lloyd MD [facility] Nephrology        Care Instructions    1. Decrease to Lisinopril 20 mg daily   -Will monitor home BP and report to Susannah Dao RN (Neph Nurse)  2. Continue potassium supplement  3. Low salt diet   4. Discussed letting MD know if sick, or dehydrated, as taking both Aleve and Lisinopril puts you at risk for kidney problems          Follow-ups after your visit        Follow-up notes from your care team     Return in about 1 year (around 12/5/2019) for Routine Visit, Lab Work.      Your next 10 appointments already scheduled     Dec 06, 2018  5:30 PM CST   (Arrive by 5:15 PM)   Implantable Loop Recorder with UC CV DEVICE 1   [facility] ([facility])    [address] [place] [address]  [phone]               Dec 06, 2018  6:00 PM CST   (Arrive by 5:45 PM)   RETURN ARRHYTHMIA with MADY Interiano CNP   [facility] ([facility])    [address] [place] [id number] [phone]            Dec 13, 2018  5:30 PM CST   EBENEZER Hand with Ana Paula Munroe OT   [facility] ([facility])    [address] [place] [id number] [phone]            Dec 20, 2018  5:30 PM CST   EBENEZER Hand with Ana Paula Munroe OT   [facility] ([facility])    [address] [place] [address]   [phone]            Jan 03, 2019  6:00 PM CST   EBENEZER Hand with Ana Paula Munroe OT   [facility] ([facility])    [address] [place] [address]   [phone]            Jan 08, 2019  2:50 PM CST   (Arrive by 2:35 PM)    Return Stroke with José Miguel Snyder MD   [facility] ([facility])    [address]  3rd Floor  [facility] [phone] [phone]            Feb 26, 2019  4:45 PM CST   (Arrive by 4:30 PM)   Return Visit with Lilli JERRY MD   [facility] ([facility])    [address]  4th Floor  [facility] [id number] [phone]            Mar 06, 2019 12:00 AM CST   CARDIAC DEVICE CHECK - REMOTE with UC ICD REMOTE   [facility] ([facility])    [address]  [address]  [facility] [id number] [phone]              Who to contact     If you have questions or need follow up information about today's clinic visit or your schedule please contact [facility] NEPHROLOG[facility] directly at [phone].  Normal or non-critical lab and imaging results will be communicated to you by MyChart, letter or phone within 4 business days after the clinic has received the results. If you do not hear from us within 7 days, please contact the clinic through [facility] or phone. If you have a critical or abnormal lab result, we will notify you by phone as soon as possible.  Submit refill requests through [employer] or call your pharmacy and they will forward the refill request to us. Please allow 3 business days for your refill to be completed.          Additional Information About Your Visit        [facility] Information     [facility] gives you secure access to your electronic health record. If you see a primary care provider, you can also send messages to your care team and make appointments. If you have questions, please call your primary care clinic.  If you do not have a primary care provider, please call [phone] and they will assist you.        Care EveryWhere ID     This is your Care EveryWhere ID. This could be used by other organizations to access your [place] medical records  [id number]       "  Your Vitals Were     Pulse Height Last Period Pulse Oximetry BMI (Body Mass Index)       105 1.562 m (5' 1.5\") (LMP Unknown) 95% 46.29 kg/m2        Blood Pressure from Last 3 Encounters:   12/05/18 94/66   11/20/18 112/74   08/01/18 111/86    Weight from Last 3 Encounters:   12/05/18 112.9 kg (249 lb)   11/20/18 113 kg (249 lb 3.2 oz)   08/01/18 111.1 kg (245 lb)              Today, you had the following     No orders found for display       Primary Care Provider Office Phone # Fax #    Lilli JERRY -[phone]        [address] [address]        Equal Access to Services     KELLIE JONES : Hadii aad ku hadasho Soomaali, waaxda luqadaha, qaybta kaalmada adeegyada, nima savage . So [facility] [phone].    ATENCIÓN: Si habla español, tiene a ojeda disposición servicios gratuitos de asistencia lingüística. Llame al [phone].    We comply with applicable federal civil rights laws and [place] laws. We do not discriminate on the basis of race, color, national origin, age, disability, sex, sexual orientation, or gender identity.            Thank you!     Thank you for choosing [facility] NEPHROLOGY  for your care. Our goal is always to provide you with excellent care. Hearing back from our patients is one way we can continue to improve our services. Please take a few minutes to complete the written survey that you may receive in the mail after your visit with us. Thank you!             Your Updated Medication List - Protect others around you: Learn how to safely use, store and throw away your medicines at www.disposemymeds.org.          This list is accurate as of 12/5/18  4:40 PM.  Always use your most recent med list.                   Brand Name Dispense Instructions for use Diagnosis    albuterol 108 (90 Base) MCG/ACT inhaler    PROAIR HFA/PROVENTIL HFA/VENTOLIN HFA    1 Inhaler    Inhale 2 puffs into the lungs every 6 hours as needed for " shortness of breath / dyspnea or wheezing    Mild intermittent asthma without complication       ALEVE 220 MG capsule   Generic drug:  naproxen sodium           atorvastatin 20 MG tablet    LIPITOR    90 tablet    Take 1 tablet (20 mg) by mouth daily    Cerebrovascular accident (CVA), unspecified mechanism (H)       Blood Pressure Monitoring Kit     1 kit    1 Device daily Pt to check BP readings daily at home    BMI 40.0-44.9, adult (H)       cetirizine 10 MG Chew    zyrTEC    30 tablet    Take 1 tablet (10 mg) by mouth as needed    Chronic allergic rhinitis, unspecified seasonality, unspecified trigger       clopidogrel 75 MG tablet    PLAVIX    90 tablet    Take 1 tablet (75 mg) by mouth daily    Acute CVA (cerebrovascular accident) (H)       DIPHENHYDRAMINE HCL PO      Take 25 mg by mouth nightly as needed        fluticasone 50 MCG/ACT nasal spray    FLONASE ALLERGY RELIEF    1 Bottle    Spray 2 sprays into both nostrils daily    Chronic allergic rhinitis, unspecified seasonality, unspecified trigger       KLOR-CON 10 MEQ CR tablet   Generic drug:  potassium chloride ER     180 tablet    TAKE 2 TABLETS (20 MEQ) BY MOUTH DAILY    Hypokalemia       lisinopril 20 MG tablet    PRINIVIL/ZESTRIL    180 tablet    TAKE 1 TABLET (20 MG) BY MOUTH 2 TIMES DAILY    Benign essential hypertension       montelukast 10 MG tablet    SINGULAIR    30 tablet    Take 1 tablet (10 mg) by mouth At Bedtime    Cough       pantoprazole 40 MG EC tablet    PROTONIX    90 tablet    Take 1 tablet (40 mg) by mouth daily    Gastroesophageal reflux disease without esophagitis       triamcinolone 0.1 % external cream    KENALOG    15 g    Apply topically 2 times daily    Rash

## 2018-12-05 NOTE — PATIENT INSTRUCTIONS
1. Decrease to Lisinopril 20 mg daily   -Will monitor home BP and report to Susannah Dao RN (Neph Nurse)  2. Continue potassium supplement  3. Low salt diet   4. Discussed letting MD know if sick, or dehydrated, as taking both Aleve and Lisinopril puts you at risk for kidney problems

## 2018-12-05 NOTE — PROGRESS NOTES
Nephrology Progress Note  12/05/2018         Assessment & Recommendations:   Natali Myers is a 58 year old year old female returning to Neph clinic for HTN management.    #Hypertension  Overall, reviewing home BPs, appears she is well controlled. Renin/Rodney neg for hyperaldo, but high renin noted. U/S negative for PURVI. She is minimally hypervolemic on exam, with trace LE edema, which may be related to ACEi. However, her BP is so well controlled and she is not bothered by it, will plan for low salt diet and continued ACEi. Additionally, she did not tolerate diuretic well with significant hypokalemia. We again discussed NSAIDs and their impact on BP, however, her pain is unmanageable without NSAIDs. She tried APAP in the past with no success.   BP today on the low side, but asymptomatic. However, given low BPs at home and intermittent symptoms, will cut back to 20mg daily. Can uptitrate to 30mg if needed. She will monitor BPs and get back to us.     #Hypokalemia  Neg hyperaldo eval. Neg thyroid disease. Denies excessive thirst or large volume urinary frequency to suggest salt/potassium wasting nephropathy. Potassium normal with supplement and ACEi. Will make no changes.    #Cryptogenic Stroke  Eval ongoing. ILR in place. Follows with Neuro and Cards.    PLAN  1. Decrease to Lisinopril 20 mg daily   -Will monitor home BP and increase to 30mg Daily if not at goal (<130/80)  2. Continue potassium supplement  3. Encouraged low salt diet   4. Discussed letting MD know if sick, or dehydrated, as taking both Aleve and Lisinopril puts her at risk for CARLOS in a hypovolemic state    RTC in 6 months with BMP.    Seen and discussed with Dr. Molina.    Vinnie Lloyd MD   [phone]    Interval History :   Pt last seen by Dr. Mccurdy in Nephrology clinic in March 2018. Since that time, she has undergone further evaluation for stroke. She was seen by Neurology, Cardiology. She had implantable loop recorder placed in August.  Results are pending.  She has been going to OT for hand pain that developed after trauma to her hand.   Home BP: Daily AM, 100-110/60's. She notes that some days her BPs were in the 90s, she felt a bit more tired.  She states that overall, she is doing okay. She denies any headaches, vision changes, syncope. Denies any problems taking PO. She has no GI complaints, reflux well controlled with protonix.. She denies any dysuria, hematuria, or flank pain.     Review of Systems:   A complete 14 point ROS was performed and negative other than HPI    Physical Exam:   BP 94/66  Pulse 105  Wt 112.9 kg (249 lb)  LMP  (LMP Unknown)  SpO2 95%  BMI 46.29 kg/m2    GENERAL APPEARANCE: Comf, NAD  EYES:  No scleral icterus, pupils equal  HENT: mouth without ulcers or lesions  PULM: lungs clear to auscultation bilaterally, equal air movement, no clubbing  CV: regular rhythm, normal rate, no rub      -edema - trace LE edema bilaterally  GI: soft, non tender, non distended, bowel sounds are +  INTEGUMENT: no cyanosis, no  rash  PSYCH: Normal mood and affect  NEURO:  AO x 3, no asterixis       Labs:   All labs reviewed by me  Electrolytes/Renal -   Recent Labs   Lab Test  11/30/18   0748  08/01/18   0735  06/26/18   1432   03/07/18   1158   02/01/18   0508   NA  138  140   --    --   138   < >  143   POTASSIUM  4.0  3.8  4.2   < >  3.9   < >  3.8   CHLORIDE  103  107   --    --   105   < >  108   CO2  28  25   --    --   23   < >  25   BUN  12  11   --    --   12   < >  10   CR  0.89  0.95   --    --   0.86   < >  0.72   GLC  112*  104*   --    --   143*   < >  96   ADEN  8.6  9.1   --    --   8.8   < >  8.2*   MAG   --    --    --    --    --    --   2.0   PHOS  2.9   --    --    --   3.1   --   3.4    < > = values in this interval not displayed.       CBC -   Recent Labs   Lab Test  08/01/18   0735  03/07/18   1158  02/02/18   0903   WBC  5.4  5.9  6.1   HGB  13.6  15.2  13.6   PLT  167  210  169       LFTs -   Recent Labs   Lab  Test  11/30/18   0748  03/07/18   1158  01/31/18   1637  07/20/16   0831  06/03/14   1539   ALKPHOS   --    --   118  109  95   BILITOTAL   --    --   0.6  1.0  0.4   ALT   --    --   26  25  14   AST   --    --   21  20  25   PROTTOTAL   --    --   8.2  7.8  7.4   ALBUMIN  3.7  4.0  3.9  3.8  4.2       Imaging:  US of kidneys reviewed. Normal kidneys with normal velocities. Both kidneys 11 cm. No stones, hydro, or cortical changse.     Past Medical History:   Diagnosis Date     Arthritis     OA left hip, knees, wrists     Asthma with bronchitis      Chronic headaches      GERD (gastroesophageal reflux disease)     omprazole     History of stroke without residual deficits 1/31/2018    headaches     Hypertension      Migraines     On-going, 1 every month or 2     Mitral stenosis 2018    mild, no murmur, rheumatic     Neck pain     trapezius tightness     Seasonal allergies     mold and grass     Stroke (H) 07/18/2016    MRI scan that showed recent infarct in the central white matter of the right frontoparietal junction;         Current Outpatient Prescriptions:      albuterol (PROAIR HFA/PROVENTIL HFA/VENTOLIN HFA) 108 (90 BASE) MCG/ACT Inhaler, Inhale 2 puffs into the lungs every 6 hours as needed for shortness of breath / dyspnea or wheezing, Disp: 1 Inhaler, Rfl: 1     atorvastatin (LIPITOR) 20 MG tablet, Take 1 tablet (20 mg) by mouth daily, Disp: 90 tablet, Rfl: 1     Blood Pressure Monitoring KIT, 1 Device daily Pt to check BP readings daily at home, Disp: 1 kit, Rfl: 0     cetirizine (ZYRTEC) 10 MG CHEW, Take 1 tablet (10 mg) by mouth as needed, Disp: 30 tablet, Rfl: 3     clopidogrel (PLAVIX) 75 MG tablet, Take 1 tablet (75 mg) by mouth daily, Disp: 90 tablet, Rfl: 3     DIPHENHYDRAMINE HCL PO, Take 25 mg by mouth nightly as needed , Disp: , Rfl:      fluticasone (FLONASE ALLERGY RELIEF) 50 MCG/ACT spray, Spray 2 sprays into both nostrils daily, Disp: 1 Bottle, Rfl: 3     KLOR-CON 10 MEQ tablet, TAKE 2  TABLETS (20 MEQ) BY MOUTH DAILY, Disp: 180 tablet, Rfl: 0     lisinopril (PRINIVIL/ZESTRIL) 20 MG tablet, TAKE 1 TABLET (20 MG) BY MOUTH 2 TIMES DAILY, Disp: 180 tablet, Rfl: 1     montelukast (SINGULAIR) 10 MG tablet, Take 1 tablet (10 mg) by mouth At Bedtime, Disp: 30 tablet, Rfl: 1     naproxen sodium (ALEVE) 220 MG capsule, , Disp: , Rfl:      pantoprazole (PROTONIX) 40 MG EC tablet, Take 1 tablet (40 mg) by mouth daily, Disp: 90 tablet, Rfl: 3     triamcinolone (KENALOG) 0.1 % cream, Apply topically 2 times daily, Disp: 15 g, Rfl: 0    SocHx/FAMHx  Notable for HTN (mother) and Stroke (father). No tobacco or ETOH.     Vinnie Lloyd MD     I have seen and evaluated the patient. Discussed with the fellow and agree with fellow's findings and plan as documented in the fellow's note.  I have reviewed today's vital signs, notes, medications, labs and imaging.   Chio Molina MD

## 2018-12-06 ENCOUNTER — ALLIED HEALTH/NURSE VISIT (OUTPATIENT)
Dept: CARDIOLOGY | Facility: CLINIC | Age: 58
End: 2018-12-06
Payer: COMMERCIAL

## 2018-12-06 ENCOUNTER — OFFICE VISIT (OUTPATIENT)
Dept: CARDIOLOGY | Facility: CLINIC | Age: 58
End: 2018-12-06
Attending: NURSE PRACTITIONER
Payer: COMMERCIAL

## 2018-12-06 VITALS
WEIGHT: 247 LBS | BODY MASS INDEX: 45.45 KG/M2 | DIASTOLIC BLOOD PRESSURE: 72 MMHG | HEIGHT: 62 IN | SYSTOLIC BLOOD PRESSURE: 105 MMHG | HEART RATE: 94 BPM | OXYGEN SATURATION: 96 %

## 2018-12-06 DIAGNOSIS — I63.9 ACUTE ISCHEMIC STROKE (H): ICD-10-CM

## 2018-12-06 DIAGNOSIS — Z95.818 STATUS POST PLACEMENT OF IMPLANTABLE LOOP RECORDER: Primary | ICD-10-CM

## 2018-12-06 DIAGNOSIS — I63.9 CRYPTOGENIC STROKE (H): ICD-10-CM

## 2018-12-06 PROCEDURE — G0463 HOSPITAL OUTPT CLINIC VISIT: HCPCS

## 2018-12-06 PROCEDURE — 99213 OFFICE O/P EST LOW 20 MIN: CPT | Mod: ZP | Performed by: NURSE PRACTITIONER

## 2018-12-06 ASSESSMENT — PAIN SCALES - GENERAL: PAINLEVEL: NO PAIN (0)

## 2018-12-06 NOTE — PATIENT INSTRUCTIONS
It was a pleasure to see you in clinic today.  Please do not hesitate to call with any questions or concerns.  You are scheduled for a remote transmissions on 3/6/19, 6/10/19 and 9/12/19.  We look forward to seeing you in clinic at your next device check in 6 months.    Manuela Peterson, RN, MS, CCRN  Electrophysiology Nurse Clinician  [facility]    During Business Hours Please Call:  [phone]  After Hours Please Call:  [phone] - select option #4 and ask for job code [id number]

## 2018-12-06 NOTE — PROGRESS NOTES
Preliminary Device Interrogation Results.  Final physician signed paceart report to be scanned and attached.    Patient seen in clinic for evaluation and iterative programming of her Medtronic loop recorder per MD orders.  Patient is scheduled to see Ema Edwards NP today. Normal loop recorder function.  No patient activated episodes recorded.  No arrhythmias recorded.  Intrinsic rhythm = NSR @ 86 bpm.  Loop recorder battery status = good.  Patient reports that she is feeling well and denies specific complaints. Plan for patient to send a remote transmission every 3 months and return to clinic in 1 year.    Loop recorder interrogation

## 2018-12-06 NOTE — MR AVS SNAPSHOT
After Visit Summary   12/6/2018    Natali Myers    MRN: [id number]           Patient Information     Date Of Birth          1960        Visit Information        Provider Department      12/6/2018 6:00 PM Ema Edwards APRN CNP [facility]        Today's Diagnoses     Status post placement of implantable loop recorder    -  1    Cryptogenic stroke (H)           Follow-ups after your visit        Your next 10 appointments already scheduled     Dec 13, 2018  5:30 PM CST   EBENEZER Hand with Ana Paula Munroe OT   [facility] ([facility])    [address] [place] [address]  [facility] [address]   [phone]            Dec 20, 2018  5:30 PM CST   EBENEZER Hand with Ana Paula Munroe OT   [facility] ([facility])    [address] [place] [address]   [phone]            Jan 03, 2019  6:00 PM CST   EBENEZER Hand with Ana Paula Munroe OT   [facility] ([facility])    [address] [place] [address]   [phone]            Jan 08, 2019  2:50 PM CST   (Arrive by 2:35 PM)   Return Stroke with José Miguel Snyder MD   [facility] Neurology ([facility])    [address] [place] [address] [phone]            Feb 26, 2019  4:45 PM CST   (Arrive by 4:30 PM)   Return Visit with Lilli JERRY MD   [facility] ([facility])    [address] [place] [address] [phone]            Mar 06, 2019 12:00 AM CST   CARDIAC DEVICE CHECK - REMOTE with UC ICD REMOTE   [facility] ([facility])    [address] [place] [id number] [phone]              Who to contact     If you have questions or need follow up information about today's clinic visit or your  "schedule please contact [facility] directly at [phone].  Normal or non-critical lab and imaging results will be communicated to you by MyChart, letter or phone within 4 business days after the clinic has received the results. If you do not hear from us within 7 days, please contact the clinic through MyChart or phone. If you have a critical or abnormal lab result, we will notify you by phone as soon as possible.  Submit refill requests through [employer] or call your pharmacy and they will forward the refill request to us. Please allow 3 business days for your refill to be completed.          Additional Information About Your Visit        [facility] Information     [employer] gives you secure access to your electronic health record. If you see a primary care provider, you can also send messages to your care team and make appointments. If you have questions, please call your primary care clinic.  If you do not have a primary care provider, please call [phone] and they will assist you.        Care EveryWhere ID     This is your Care EveryWhere ID. This could be used by other organizations to access your [place] medical records  [id number]        Your Vitals Were     Pulse Height Last Period Pulse Oximetry BMI (Body Mass Index)       94 1.562 m (5' 1.5\") (LMP Unknown) 96% 45.91 kg/m2        Blood Pressure from Last 3 Encounters:   12/06/18 105/72   12/05/18 94/66   11/20/18 112/74    Weight from Last 3 Encounters:   12/06/18 112 kg (247 lb)   12/05/18 112.9 kg (249 lb)   11/20/18 113 kg (249 lb 3.2 oz)              Today, you had the following     No orders found for display       Primary Care Provider Office Phone # Fax #    Lilli JERRY -[phone]       [address] [address]        Equal Access to Services     KELLIE JONES AH: Roberto gutierrezo Leobardo, waaxda luqadaha, qaybta kaalmada tara, nima sargent. So wa " [phone].    ATENCIÓN: Si ese lynch, tiene a ojeda disposición servicios gratuitos de asistencia lingüística. Bijan hilton [phone].    We comply with applicable federal civil rights laws and [place] laws. We do not discriminate on the basis of race, color, national origin, age, disability, sex, sexual orientation, or gender identity.            Thank you!     Thank you for choosing [facility]  for your care. Our goal is always to provide you with excellent care. Hearing back from our patients is one way we can continue to improve our services. Please take a few minutes to complete the written survey that you may receive in the mail after your visit with us. Thank you!             Your Updated Medication List - Protect others around you: Learn how to safely use, store and throw away your medicines at www.disposemymeds.org.          This list is accurate as of 12/6/18  6:47 PM.  Always use your most recent med list.                   Brand Name Dispense Instructions for use Diagnosis    albuterol 108 (90 Base) MCG/ACT inhaler    PROAIR HFA/PROVENTIL HFA/VENTOLIN HFA    1 Inhaler    Inhale 2 puffs into the lungs every 6 hours as needed for shortness of breath / dyspnea or wheezing    Mild intermittent asthma without complication       ALEVE 220 MG capsule   Generic drug:  naproxen sodium           atorvastatin 20 MG tablet    LIPITOR    90 tablet    Take 1 tablet (20 mg) by mouth daily    Cerebrovascular accident (CVA), unspecified mechanism (H)       Blood Pressure Monitoring Kit     1 kit    1 Device daily Pt to check BP readings daily at home    BMI 40.0-44.9, adult (H)       cetirizine 10 MG Chew    zyrTEC    30 tablet    Take 1 tablet (10 mg) by mouth as needed    Chronic allergic rhinitis, unspecified seasonality, unspecified trigger       clopidogrel 75 MG tablet    PLAVIX    90 tablet    Take 1 tablet (75 mg) by mouth daily    Acute CVA (cerebrovascular accident) (H)       DIPHENHYDRAMINE HCL  PO      Take 25 mg by mouth nightly as needed        fluticasone 50 MCG/ACT nasal spray    FLONASE ALLERGY RELIEF    1 Bottle    Spray 2 sprays into both nostrils daily    Chronic allergic rhinitis, unspecified seasonality, unspecified trigger       KLOR-CON 10 MEQ CR tablet   Generic drug:  potassium chloride ER     180 tablet    TAKE 2 TABLETS (20 MEQ) BY MOUTH DAILY    Hypokalemia       lisinopril 20 MG tablet    PRINIVIL/ZESTRIL    180 tablet    TAKE 1 TABLET (20 MG) BY MOUTH 2 TIMES DAILY    Benign essential hypertension       montelukast 10 MG tablet    SINGULAIR    30 tablet    Take 1 tablet (10 mg) by mouth At Bedtime    Cough       pantoprazole 40 MG EC tablet    PROTONIX    90 tablet    Take 1 tablet (40 mg) by mouth daily    Gastroesophageal reflux disease without esophagitis       triamcinolone 0.1 % external cream    KENALOG    15 g    Apply topically 2 times daily    Rash

## 2018-12-06 NOTE — NURSING NOTE
Chief Complaint   Patient presents with     Follow Up For     3 month f/u device prior     Medications reviewed and vitals performed.  Candace Vicente CMA

## 2018-12-06 NOTE — PROGRESS NOTES
"    Heart Care - Clinical Cardiac Electrophysiology     HPI:   Natali Myers is a 58 year old female who presents for follow up s/p ILR for cryptogenic stroke. She has a past medical history significant for CVA (2016, 2018), HTN, asthma, and migraines. Neurology and cardiac workup revealed no probable cause for CVAs; there is no definite evidence of cardioembolism, large artery atherosclerosis, small artery disease, or other determined etiology, and no evidence of atrial fibrillation on a 12-lead ECG or on short-term cardiac monitoring.   An ILR was placed on 8/1/208 for long term cardiac monitoring for atrial fibrillation to guide medical management.       Reviewed current interval:  - Echocardiogram 1/31/208 showed normal LV function with EF 60-65%, grade 1 diastolic dysfunction, and presumed rheumatic heart disease with mild mitral stenosis.   - RUSTY 4/2018 showed no source of cardiac embolism.   - 12-14 day ZIOs 2/2018 & 4/2018 have no evidence of AF.   - Today s Device check: \"Medtronic loop recorder per MD orders.  Patient is scheduled to see Ema Edwards NP today. Normal loop recorder function.  No patient activated episodes recorded.  No arrhythmias recorded.  Intrinsic rhythm = NSR @ 86 bpm.  Loop recorder battery status = good.  Patient reports that she is feeling well and denies specific complaints. Plan for patient to send a remote transmission every 3 months and return to clinic in 1 year.\"    Current interval history:  Patient states has resumed normal activity.  States that she has been feeling well.  Denies pain at device site except when she had a mammogram it was tender around the ILR for a couple of days. Denies chest pain or pressure, dizziness, syncope, angina, dyspnea at rest or with exertion, dry cough, palpitations, or pedal edema. Denies new signs/symptoms of stroke such as visual disturbance, difficulty speaking, facial drooping, confusion, problems with gait, or any new numbness " or weakness. Denies dysphagia, fever, chills, nausea, or vomiting. States that puncture site is well healed without drainage, redness, warmth, or pain.    Current Outpatient Prescriptions   Medication Sig Dispense Refill     albuterol (PROAIR HFA/PROVENTIL HFA/VENTOLIN HFA) 108 (90 BASE) MCG/ACT Inhaler Inhale 2 puffs into the lungs every 6 hours as needed for shortness of breath / dyspnea or wheezing 1 Inhaler 1     atorvastatin (LIPITOR) 20 MG tablet Take 1 tablet (20 mg) by mouth daily 90 tablet 1     Blood Pressure Monitoring KIT 1 Device daily Pt to check BP readings daily at home 1 kit 0     cetirizine (ZYRTEC) 10 MG CHEW Take 1 tablet (10 mg) by mouth as needed 30 tablet 3     clopidogrel (PLAVIX) 75 MG tablet Take 1 tablet (75 mg) by mouth daily 90 tablet 3     DIPHENHYDRAMINE HCL PO Take 25 mg by mouth nightly as needed        fluticasone (FLONASE ALLERGY RELIEF) 50 MCG/ACT spray Spray 2 sprays into both nostrils daily 1 Bottle 3     KLOR-CON 10 MEQ tablet TAKE 2 TABLETS (20 MEQ) BY MOUTH DAILY 180 tablet 0     lisinopril (PRINIVIL/ZESTRIL) 20 MG tablet TAKE 1 TABLET (20 MG) BY MOUTH 2 TIMES DAILY 180 tablet 1     montelukast (SINGULAIR) 10 MG tablet Take 1 tablet (10 mg) by mouth At Bedtime 30 tablet 1     naproxen sodium (ALEVE) 220 MG capsule        pantoprazole (PROTONIX) 40 MG EC tablet Take 1 tablet (40 mg) by mouth daily 90 tablet 3     triamcinolone (KENALOG) 0.1 % cream Apply topically 2 times daily 15 g 0       Past Medical History:   Diagnosis Date     Arthritis     OA left hip, knees, wrists     Asthma with bronchitis      Chronic headaches      GERD (gastroesophageal reflux disease)     omprazole     History of stroke without residual deficits 1/31/2018    headaches     Hypertension      Migraines     On-going, 1 every month or 2     Mitral stenosis 2018    mild, no murmur, rheumatic     Neck pain     trapezius tightness     Seasonal allergies     mold and grass     Stroke (H) 07/18/2016    MRI  "scan that showed recent infarct in the central white matter of the right frontoparietal junction;         Past Surgical History:   Procedure Laterality Date     DILATION AND CURETTAGE  5/24/2014    Procedure: DILATION AND CURETTAGE;  Surgeon: Elyse Mcconnell MD;  [facility]     HYSTERECTOMY TOTAL ABDOMINAL  6/19/2014    Procedure: HYSTERECTOMY TOTAL ABDOMINAL;  Surgeon: Elyse Mcconnell MD;  [facility]     KNEE SURGERY  age 23     cartilage/ incision both knees     RELEASE CARPAL TUNNEL BILATERAL  1996    [place]     SALPINGO-OOPHORECTOMY BILATERAL  6/19/2014    Procedure: SALPINGO-OOPHORECTOMY BILATERAL;  Surgeon: Elyse Mcconnell MD;  [facility]     WRIST SURGERY         Family History   Problem Relation Age of Onset     Diabetes Mother      Blood Disease Mother      ITP      KIDNEY DISEASE Mother      Cancer Mother      Cardiovascular Father      Alcohol/Drug Father      Cerebrovascular Disease Father 70     ,     Dementia Father      Diabetes Maternal Grandmother      Thyroid Disease Maternal Grandmother      Cancer Paternal Grandmother      eye      Dementia Paternal Grandmother      Asthma Brother      Multiple Sclerosis Son 30       Social History   Substance Use Topics     Smoking status: Never Smoker     Smokeless tobacco: Never Used     Alcohol use Yes      Comment: One a month        Allergies   Allergen Reactions     Mold      Seasonal Allergies      Hay fever     Codeine Rash     Latex Rash     Pt states this is NOT a latex allergy,allergy is neoprene rubber/in knee brace     No Clinical Screening - See Comments Rash     Neoprene rubber       Penicillins Rash         ROS:   A complete review of systems was performed and is negative except as noted in the HPI.    Physical Examination:  Vitals: /72 (BP Location: Left arm, Patient Position: Chair, Cuff Size: Adult Large)  Pulse 94  Ht 1.562 m (5' 1.5\")  Wt 112 kg (247 lb)  LMP  (LMP Unknown)  SpO2 96%  BMI 45.91 kg/m2  BMI= Body " mass index is 45.91 kg/(m^2).    GENERAL APPEARANCE: healthy, alert, and no acute distress  HEENT: no icterus, no xanthelasmas, normal pupil size and reaction, no cyanosis.  NECK: no asymmetry, no cervical bruits, no JVD   RESPIRATORY: lungs clear to auscultation - no rales, rhonchi or wheezes, no use of accessory muscles, no retractions, respirations are unlabored, normal respiratory rate  CARDIOVASCULAR: regular rhythm, normal S1 with physiologic split S2, no S3 or S4 and no murmur, click or rub.  GI: no abdominal bruits, soft, non-tender  EXTREMITIES: no clubbing  NEURO: alert and oriented to person/place/time, normal speech, gait and affect  VASC: Radial and posterior tibialis pulses +2 and symmetric bilaterally. No cyanosis. No edema.   SKIN: no ecchymoses, no rashes. Well healed left chest wall device site is noted.    Assessment and recommendations:  Patient has resumed normal activities and incision sites are well healed.    # Cardiac device in situ: Cryptogenic stroke(2016, 2018) s/p ILR placed on 8/1/208 for long term cardiac monitoring for atrial fibrillation to guide medical management.   1. Normal device function. No evidence of AF or arrhythmias on device checks.   2. Keep scheduled follow ups with Device Clinic    Follow up: Follow up in EP Clinic in one year or follow up sooner as needed for symptoms or problems.    Patient declines AVS.     Patient expresses understanding and agreement with the plan.    I appreciate the chance to help with Natali Myers's care. Please let me know if you have any questions or concerns.    Ema EARL, NP-C

## 2018-12-06 NOTE — MR AVS SNAPSHOT
After Visit Summary   12/6/2018    Natali Myers    MRN: [id number]           Patient Information     Date Of Birth          1960        Visit Information        Provider Department      12/6/2018 5:30 PM UC CV DEVICE 1 [facility]        Today's Diagnoses     Acute ischemic stroke (H)          Care Instructions    It was a pleasure to see you in clinic today.  Please do not hesitate to call with any questions or concerns.  You are scheduled for a remote transmissions on 3/6/19, 6/10/19 and 9/12/19.  We look forward to seeing you in clinic at your next device check in 6 months.    Manuela Peterson, RN, MS, CCRN  Electrophysiology Nurse Clinician  [facility] [place]    During Business Hours Please Call:  [phone]  After Hours Please Call:  [phone] - select option #4 and ask for job code [phone]                        Follow-ups after your visit        Your next 10 appointments already scheduled     Dec 13, 2018  5:30 PM CST   EBENEZER Hand with Ana Paula Munroe OT   [facility] ([facility])    [address] [place] [id number]   [phone]            Dec 20, 2018  5:30 PM CST   EBENEZER Hand with Ana Paula Munroe OT   [facility] ([facility])    [address] [place] [address]   [phone]            Jan 03, 2019  6:00 PM CST   EBENEZER Hand with Ana Paula Munroe OT   [facility] ([facility])    [address] [place] [address]   [phone]            Jan 08, 2019  2:50 PM CST   (Arrive by 2:35 PM)   Return Stroke with José Miguel Snyder MD   [facility] Neu[facility] ([facility])    [address] [place] [id number] [phone]            Feb 26, 2019  4:45 PM CST   (Arrive by 4:30 PM)   Return Visit with Lilli JERRY MD   [facility]  Primary Care Clinic ([facility])    [address]  4th Floor  [facility] [place] [id number] [phone]            Mar 06, 2019 12:00 AM CST   CARDIAC DEVICE CHECK - REMOTE with UC ICD REMOTE   [facility] ([facility])    [address]  [address]  [facility] [place] [address] [phone]              Who to contact     Please call your clinic at No information on file. to:    Ask questions about your health    Make or cancel appointments    Discuss your medicines    Learn about your test results    Speak to your doctor            Additional Information About Your Visit        [employer] Information     [employer] gives you secure access to your electronic health record. If you see a primary care provider, you can also send messages to your care team and make appointments. If you have questions, please call your primary care clinic.  If you do not have a primary care provider, please call [phone] and they will assist you.      [employer] is an electronic gateway that provides easy, online access to your medical records. With [employer], you can request a clinic appointment, read your test results, renew a prescription or communicate with your care team.     To access your existing account, please contact your [facility] or call [phone] for assistance.        Care EveryWhere ID     This is your Care EveryWhere ID. This could be used by other organizations to access your [facility] medical records  [id number]        Your Vitals Were     Last Period                   (LMP Unknown)            Blood Pressure from Last 3 Encounters:   12/06/18 105/72   12/05/18 94/66   11/20/18 112/74    Weight from Last 3 Encounters:   12/06/18 112 kg (247 lb)   12/05/18 112.9 kg (249 lb)   11/20/18 113 kg (249 lb 3.2 oz)              We Performed the Following     Follow-Up with Device Clinic - 3 months     INTERR DEVICE EVAL IN PERSON,  LOOP RECORDER        Primary Care Provider Office Phone # Fax #    Lilli JERRY [phone]        [address] [address]        Equal Access to Services     KELLIE JONES : Hadaspen luther clifford matto Soregineali, waaxda luqadaha, qaybta kaalmada adeegyada, nima thacker laHumbertojayne sargent. So [facility] [phone].    ATENCIÓN: Si habla español, tiene a ojeda disposición servicios gratuitos de asistencia lingüística. Llame al [phone].    We comply with applicable federal civil rights laws and [place] laws. We do not discriminate on the basis of race, color, national origin, age, disability, sex, sexual orientation, or gender identity.            Thank you!     Thank you for choosing [facility]  for your care. Our goal is always to provide you with excellent care. Hearing back from our patients is one way we can continue to improve our services. Please take a few minutes to complete the written survey that you may receive in the mail after your visit with us. Thank you!             Your Updated Medication List - Protect others around you: Learn how to safely use, store and throw away your medicines at www.disposemymeds.org.          This list is accurate as of 12/6/18  6:47 PM.  Always use your most recent med list.                   Brand Name Dispense Instructions for use Diagnosis    albuterol 108 (90 Base) MCG/ACT inhaler    PROAIR HFA/PROVENTIL HFA/VENTOLIN HFA    1 Inhaler    Inhale 2 puffs into the lungs every 6 hours as needed for shortness of breath / dyspnea or wheezing    Mild intermittent asthma without complication       ALEVE 220 MG capsule   Generic drug:  naproxen sodium           atorvastatin 20 MG tablet    LIPITOR    90 tablet    Take 1 tablet (20 mg) by mouth daily    Cerebrovascular accident (CVA), unspecified mechanism (H)       Blood Pressure Monitoring Kit     1 kit    1 Device daily Pt to check BP readings daily at home    BMI 40.0-44.9,  adult (H)       cetirizine 10 MG Chew    zyrTEC    30 tablet    Take 1 tablet (10 mg) by mouth as needed    Chronic allergic rhinitis, unspecified seasonality, unspecified trigger       clopidogrel 75 MG tablet    PLAVIX    90 tablet    Take 1 tablet (75 mg) by mouth daily    Acute CVA (cerebrovascular accident) (H)       DIPHENHYDRAMINE HCL PO      Take 25 mg by mouth nightly as needed        fluticasone 50 MCG/ACT nasal spray    FLONASE ALLERGY RELIEF    1 Bottle    Spray 2 sprays into both nostrils daily    Chronic allergic rhinitis, unspecified seasonality, unspecified trigger       KLOR-CON 10 MEQ CR tablet   Generic drug:  potassium chloride ER     180 tablet    TAKE 2 TABLETS (20 MEQ) BY MOUTH DAILY    Hypokalemia       lisinopril 20 MG tablet    PRINIVIL/ZESTRIL    180 tablet    TAKE 1 TABLET (20 MG) BY MOUTH 2 TIMES DAILY    Benign essential hypertension       montelukast 10 MG tablet    SINGULAIR    30 tablet    Take 1 tablet (10 mg) by mouth At Bedtime    Cough       pantoprazole 40 MG EC tablet    PROTONIX    90 tablet    Take 1 tablet (40 mg) by mouth daily    Gastroesophageal reflux disease without esophagitis       triamcinolone 0.1 % external cream    KENALOG    15 g    Apply topically 2 times daily    Rash

## 2018-12-06 NOTE — LETTER
"12/6/2018      RE: Natali Myers  [address] [facility] [address]       Dear Colleague,    Thank you for the opportunity to participate in the care of your patient, Natali Myers, at the [facility]. Please see a copy of my visit note below.  Heart Care - Clinical Cardiac Electrophysiology     HPI:   Natali Myers is a 58 year old female who presents for follow up s/p ILR for cryptogenic stroke. She has a past medical history significant for CVA (2016, 2018), HTN, asthma, and migraines. Neurology and cardiac workup revealed no probable cause for CVAs; there is no definite evidence of cardioembolism, large artery atherosclerosis, small artery disease, or other determined etiology, and no evidence of atrial fibrillation on a 12-lead ECG or on short-term cardiac monitoring.   An ILR was placed on 8/1/208 for long term cardiac monitoring for atrial fibrillation to guide medical management.       Reviewed current interval:  - Echocardiogram 1/31/208 showed normal LV function with EF 60-65%, grade 1 diastolic dysfunction, and presumed rheumatic heart disease with mild mitral stenosis.   - RUSTY 4/2018 showed no source of cardiac embolism.   - 12-14 day ZIOs 2/2018 & 4/2018 have no evidence of AF.   - Today s Device check: \"Medtronic loop recorder per MD orders.  Patient is scheduled to see Ema Edwards NP today. Normal loop recorder function.  No patient activated episodes recorded.  No arrhythmias recorded.  Intrinsic rhythm = NSR @ 86 bpm.  Loop recorder battery status = good.  Patient reports that she is feeling well and denies specific complaints. Plan for patient to send a remote transmission every 3 months and return to clinic in 1 year.\"    Current interval history:  Patient states has resumed normal activity.  States that she has been feeling well.  Denies pain at device site except when she had a mammogram it was tender " around the ILR for a couple of days. Denies chest pain or pressure, dizziness, syncope, angina, dyspnea at rest or with exertion, dry cough, palpitations, or pedal edema. Denies new signs/symptoms of stroke such as visual disturbance, difficulty speaking, facial drooping, confusion, problems with gait, or any new numbness or weakness. Denies dysphagia, fever, chills, nausea, or vomiting. States that puncture site is well healed without drainage, redness, warmth, or pain.    Current Outpatient Prescriptions   Medication Sig Dispense Refill     albuterol (PROAIR HFA/PROVENTIL HFA/VENTOLIN HFA) 108 (90 BASE) MCG/ACT Inhaler Inhale 2 puffs into the lungs every 6 hours as needed for shortness of breath / dyspnea or wheezing 1 Inhaler 1     atorvastatin (LIPITOR) 20 MG tablet Take 1 tablet (20 mg) by mouth daily 90 tablet 1     Blood Pressure Monitoring KIT 1 Device daily Pt to check BP readings daily at home 1 kit 0     cetirizine (ZYRTEC) 10 MG CHEW Take 1 tablet (10 mg) by mouth as needed 30 tablet 3     clopidogrel (PLAVIX) 75 MG tablet Take 1 tablet (75 mg) by mouth daily 90 tablet 3     DIPHENHYDRAMINE HCL PO Take 25 mg by mouth nightly as needed        fluticasone (FLONASE ALLERGY RELIEF) 50 MCG/ACT spray Spray 2 sprays into both nostrils daily 1 Bottle 3     KLOR-CON 10 MEQ tablet TAKE 2 TABLETS (20 MEQ) BY MOUTH DAILY 180 tablet 0     lisinopril (PRINIVIL/ZESTRIL) 20 MG tablet TAKE 1 TABLET (20 MG) BY MOUTH 2 TIMES DAILY 180 tablet 1     montelukast (SINGULAIR) 10 MG tablet Take 1 tablet (10 mg) by mouth At Bedtime 30 tablet 1     naproxen sodium (ALEVE) 220 MG capsule        pantoprazole (PROTONIX) 40 MG EC tablet Take 1 tablet (40 mg) by mouth daily 90 tablet 3     triamcinolone (KENALOG) 0.1 % cream Apply topically 2 times daily 15 g 0       Past Medical History:   Diagnosis Date     Arthritis     OA left hip, knees, wrists     Asthma with bronchitis      Chronic headaches      GERD (gastroesophageal reflux  disease)     omprazole     History of stroke without residual deficits 1/31/2018    headaches     Hypertension      Migraines     On-going, 1 every month or 2     Mitral stenosis 2018    mild, no murmur, rheumatic     Neck pain     trapezius tightness     Seasonal allergies     mold and grass     Stroke (H) 07/18/2016    MRI scan that showed recent infarct in the central white matter of the right frontoparietal junction;         Past Surgical History:   Procedure Laterality Date     DILATION AND CURETTAGE  5/24/2014    Procedure: DILATION AND CURETTAGE;  Surgeon: Elyse Mcconnell MD;  [facility]     HYSTERECTOMY TOTAL ABDOMINAL  6/19/2014    Procedure: HYSTERECTOMY TOTAL ABDOMINAL;  Surgeon: Elyse Mcconnell MD;  [facility]     KNEE SURGERY  age 23     cartilage/ incision both knees     RELEASE CARPAL TUNNEL BILATERAL  1996    [place]     SALPINGO-OOPHORECTOMY BILATERAL  6/19/2014    Procedure: SALPINGO-OOPHORECTOMY BILATERAL;  Surgeon: Elyse Mcconnell MD;  [facility]     WRIST SURGERY         Family History   Problem Relation Age of Onset     Diabetes Mother      Blood Disease Mother      ITP      KIDNEY DISEASE Mother      Cancer Mother      Cardiovascular Father      Alcohol/Drug Father      Cerebrovascular Disease Father 70     ,     Dementia Father      Diabetes Maternal Grandmother      Thyroid Disease Maternal Grandmother      Cancer Paternal Grandmother      eye      Dementia Paternal Grandmother      Asthma Brother      Multiple Sclerosis Son 30       Social History   Substance Use Topics     Smoking status: Never Smoker     Smokeless tobacco: Never Used     Alcohol use Yes      Comment: One a month        Allergies   Allergen Reactions     Mold      Seasonal Allergies      Hay fever     Codeine Rash     Latex Rash     Pt states this is NOT a latex allergy,allergy is neoprene rubber/in knee brace     No Clinical Screening - See Comments Rash     Neoprene rubber       Penicillins Rash  "        ROS:   A complete review of systems was performed and is negative except as noted in the HPI.    Physical Examination:  Vitals: /72 (BP Location: Left arm, Patient Position: Chair, Cuff Size: Adult Large)  Pulse 94  Ht 1.562 m (5' 1.5\")  Wt 112 kg (247 lb)  LMP  (LMP Unknown)  SpO2 96%  BMI 45.91 kg/m2  BMI= Body mass index is 45.91 kg/(m^2).    GENERAL APPEARANCE: healthy, alert, and no acute distress  HEENT: no icterus, no xanthelasmas, normal pupil size and reaction, no cyanosis.  NECK: no asymmetry, no cervical bruits, no JVD   RESPIRATORY: lungs clear to auscultation - no rales, rhonchi or wheezes, no use of accessory muscles, no retractions, respirations are unlabored, normal respiratory rate  CARDIOVASCULAR: regular rhythm, normal S1 with physiologic split S2, no S3 or S4 and no murmur, click or rub.  GI: no abdominal bruits, soft, non-tender  EXTREMITIES: no clubbing  NEURO: alert and oriented to person/place/time, normal speech, gait and affect  VASC: Radial and posterior tibialis pulses +2 and symmetric bilaterally. No cyanosis. No edema.   SKIN: no ecchymoses, no rashes. Well healed left chest wall device site is noted.    Assessment and recommendations:  Patient has resumed normal activities and incision sites are well healed.    # Cardiac device in situ: Cryptogenic stroke(2016, 2018) s/p ILR placed on 8/1/208 for long term cardiac monitoring for atrial fibrillation to guide medical management.   1. Normal device function. No evidence of AF or arrhythmias on device checks.   2. Keep scheduled follow ups with Device Clinic    Follow up: Follow up in EP Clinic in one year or follow up sooner as needed for symptoms or problems.    Patient declines AVS.     Patient expresses understanding and agreement with the plan.    I appreciate the chance to help with Natali Linares Myers's care. Please let me know if you have any questions or concerns.    Ema EARL, NP-C  "

## 2018-12-06 NOTE — MR AVS SNAPSHOT
MRN:[id number]                      After Visit Summary   12/6/2018    Natali Myers    MRN: [id number]           Visit Information        Provider Department      12/6/2018 5:30 PM UC CV DEVICE 1 [facility]        Your next 10 appointments already scheduled     Dec 06, 2018  6:00 PM CST   (Arrive by 5:45 PM)   RETURN ARRHYTHMIA with MADY Interiano CNP   [facility] ([facility] [place])    [address]  [address]  [facility] [id number] [phone]            Dec 13, 2018  5:30 PM CST   EBENEZER Hand with Ana Paula Munroe OT   [facility] ([facility] [place])    [address] [place] [address]  4th Floor  [facility] [id number] [phone]            Dec 20, 2018  5:30 PM CST   EBENEZER Hand with Ana Paula Munroe OT   [facility] ([facility] [place])    [address] [place] [address]  4th Floor  [facility] [id number] [phone]            Jan 03, 2019  6:00 PM CST   EBENEZER Hand with Ana Paula Munroe OT   [facility] ([facility] [place])    [address] [place] [address]  4th Floor  [facility] [id number] [phone]            Jan 08, 2019  2:50 PM CST   (Arrive by 2:35 PM)   Return Stroke with José Miguel Snyder MD   [facility] Neu[facility] ([facility] [place])    [address] [place] [address]  3rd Floor  [facility] [id number] [phone]            Feb 26, 2019  4:45 PM CST   (Arrive by 4:30 PM)   Return Visit with Lilli JERRY MD   [facility] ([facility] [place])    [address] [place] [address]  4th Floor  [facility] [id number] [phone]            Mar 06, 2019 12:00 AM CST   CARDIAC DEVICE CHECK - REMOTE with UC ICD REMOTE   [facility] ([facility] [place])    [address] [place] [address]  [address] [place] [id number] [phone]              Care Instructions    It was a pleasure to see  you in clinic today.  Please do not hesitate to call with any questions or concerns.  You are scheduled for a remote transmissions on 3/6/19, 6/10/19 and 9/12/19.  We look forward to seeing you in clinic at your next device check in 6 months.    Manuela Peterson, RN, MS, CCRN  Electrophysiology Nurse Clinician  [facility]    During Business Hours Please Call:  [phone]  After Hours Please Call:  [phone] - select option #4 and ask for job code [phone]                       YOGASMOGA Information     YOGASMOGA gives you secure access to your electronic health record. If you see a primary care provider, you can also send messages to your care team and make appointments. If you have questions, please call your primary care clinic.  If you do not have a primary care provider, please call [phone] and they will assist you.      YOGASMOGA is an electronic gateway that provides easy, online access to your medical records. With YOGASMOGA, you can request a clinic appointment, read your test results, renew a prescription or communicate with your care team.     To access your existing account, please contact your [facility] or call [phone] for assistance.        Care EveryWhere ID     This is your Care EveryWhere ID. This could be used by other organizations to access your [facility] medical records  [id number]        Equal Access to Services     KELLIE JONES : Hadii luther Booth, waaxda luqadaha, qaybta kaalmada adearmida, nima sargent. So [facility] [phone].    ATENCIÓN: Si habla español, tiene a ojeda disposición servicios gratuitos de asistencia lingüística. Llame al [phone].    We comply with applicable federal civil rights laws and [place] laws. We do not discriminate on the basis of race, color, national origin, age, disability, sex, sexual orientation, or gender identity.

## 2018-12-13 ENCOUNTER — THERAPY VISIT (OUTPATIENT)
Dept: OCCUPATIONAL THERAPY | Facility: CLINIC | Age: 58
End: 2018-12-13
Payer: COMMERCIAL

## 2018-12-13 ENCOUNTER — OFFICE VISIT (OUTPATIENT)
Dept: ORTHOPEDICS | Facility: CLINIC | Age: 58
End: 2018-12-13
Payer: COMMERCIAL

## 2018-12-13 VITALS — BODY MASS INDEX: 45.45 KG/M2 | RESPIRATION RATE: 16 BRPM | HEIGHT: 62 IN | WEIGHT: 247 LBS

## 2018-12-13 DIAGNOSIS — M79.644 PAIN OF FINGER OF RIGHT HAND: Primary | ICD-10-CM

## 2018-12-13 DIAGNOSIS — M65.4 TENOSYNOVITIS, DE QUERVAIN: Primary | ICD-10-CM

## 2018-12-13 PROCEDURE — 97535 SELF CARE MNGMENT TRAINING: CPT | Mod: GO | Performed by: OCCUPATIONAL THERAPIST

## 2018-12-13 PROCEDURE — 97110 THERAPEUTIC EXERCISES: CPT | Mod: GO | Performed by: OCCUPATIONAL THERAPIST

## 2018-12-13 RX ADMIN — METHYLPREDNISOLONE ACETATE 40 MG: 40 INJECTION, SUSPENSION INTRA-ARTICULAR; INTRALESIONAL; INTRAMUSCULAR; SOFT TISSUE at 19:07

## 2018-12-13 RX ADMIN — LIDOCAINE HYDROCHLORIDE 1 ML: 10 INJECTION, SOLUTION EPIDURAL; INFILTRATION; INTRACAUDAL; PERINEURAL at 19:07

## 2018-12-13 ASSESSMENT — MIFFLIN-ST. JEOR: SCORE: 1645.69

## 2018-12-13 NOTE — LETTER
12/13/2018       RE: Natali Myers  [address]  [facility] [address]     Dear Colleague,    Thank you for referring your patient, Natali Myers, to the [facility] CLINIC at [facility]. Please see a copy of my visit note below.          SPORTS & ORTHOPEDIC WALK-IN VISIT 12/13/2018    Primary Care Physician: Dr. Ivan    In to see therapy for the last week for right fifth mcp joint. Happened in September, had gotten better. A few months ago she got a very tender spot on radial side wrist that feels like a severe bruise.Lasted about seven weeks before starting to get better. Was okay last week but is now bothering her again. States theres a bump. Does a lot of typing at work. Sore across entire dorsal wrist with full flexion. Now also starting to notice it more frequently.     Reason for visit:     What part of your body is injured / painful?  right wrist    What caused the injury /pain? Unsure    How long ago did your injury occur or pain begin? a week ago    What are your most bothersome symptoms? Pain    How would you characterize your symptom?  Aching most of the time and sharp with palpation    What makes your symptoms better? Ibuprofen    What makes your symptoms worse? Certain Movement, palpation    Have you been previously seen for this problem? Yes, PCP, hand therapy    Medical History:    Any recent changes to your medical history? No    Any new medication prescribed since last visit? No    Have you had surgery on this body part before? Yes carpal tunnel release 20 years ago    Social History:    Occupation:     Handedness: Right    Exercise:        Arthrocentesis  Date/Time: 12/13/2018 7:07 PM  Performed by: Hakan Ruano MD  Authorized by: Hakan Ruano MD     Indications:  Pain  Needle Size:  25 G  Guidance: ultrasound    Location:  Wrist  Medications:  1 mL lidocaine (PF) 1  %; 40 mg methylPREDNISolone 40 MG/ML  Outcome:  Tolerated well, no immediate complications  Procedure discussed: discussed risks, benefits, and alternatives    Consent Given by:  Patient  Timeout: timeout called immediately prior to procedure    Prep: patient was prepped and draped in usual sterile fashion      Patient seen and examined, agree with above and I performed the procedure injection.Dr Ruano    HISTORY OF PRESENT ILLNESS  Ms. Myers is a pleasant 58 year old year old female who presents to clinic today with right wrist pain  Natali explains that she has had this for 2 months  Location: right wrist  Quality:  achy pain    Severity: 5/10 at worst    Duration: see above  Timing: occurs intermittently    Modifying factors:  resting and non-use makes it better, movement and use makes it worse  Associated signs & symptoms: wrist swelling    Additional history: as documented    MEDICAL HISTORY  Patient Active Problem List   Diagnosis     Pre-op exam     Esophageal reflux (GERD)     BMI 40.0-44.9, adult (H)     Anemia     S/P total hysterectomy and bilateral salpingo-oophorectomy     CARDIOVASCULAR SCREENING; LDL GOAL LESS THAN 160     Acute ischemic stroke (H)     Stroke (cerebrum) (H)     Essential hypertension, benign     Status post placement of implantable loop recorder     Pain of finger of right hand     Radial styloid tenosynovitis (de quervain)       Current Outpatient Medications   Medication Sig Dispense Refill     diclofenac (VOLTAREN) 1 % topical gel Apply 4 grams to knees or 2 grams to hands four times daily using enclosed dosing card. 100 g 1     albuterol (PROAIR HFA/PROVENTIL HFA/VENTOLIN HFA) 108 (90 BASE) MCG/ACT Inhaler Inhale 2 puffs into the lungs every 6 hours as needed for shortness of breath / dyspnea or wheezing 1 Inhaler 1     atorvastatin (LIPITOR) 20 MG tablet Take 1 tablet (20 mg) by mouth daily 90 tablet 1     Blood Pressure Monitoring KIT 1 Device daily Pt to check BP readings  "daily at home 1 kit 0     cetirizine (ZYRTEC) 10 MG CHEW Take 1 tablet (10 mg) by mouth as needed 30 tablet 3     clopidogrel (PLAVIX) 75 MG tablet Take 1 tablet (75 mg) by mouth daily 90 tablet 3     DIPHENHYDRAMINE HCL PO Take 25 mg by mouth nightly as needed        fluticasone (FLONASE ALLERGY RELIEF) 50 MCG/ACT spray Spray 2 sprays into both nostrils daily 1 Bottle 3     KLOR-CON 10 MEQ tablet TAKE 2 TABLETS (20 MEQ) BY MOUTH DAILY 180 tablet 0     lisinopril (PRINIVIL/ZESTRIL) 20 MG tablet TAKE 1 TABLET (20 MG) BY MOUTH 2 TIMES DAILY 180 tablet 1     montelukast (SINGULAIR) 10 MG tablet Take 1 tablet (10 mg) by mouth At Bedtime 30 tablet 1     naproxen sodium (ALEVE) 220 MG capsule        pantoprazole (PROTONIX) 40 MG EC tablet Take 1 tablet (40 mg) by mouth daily 90 tablet 3     triamcinolone (KENALOG) 0.1 % cream Apply topically 2 times daily 15 g 0       Allergies   Allergen Reactions     Mold      Seasonal Allergies      Hay fever     Codeine Rash     Latex Rash     Pt states this is NOT a latex allergy,allergy is neoprene rubber/in knee brace     No Clinical Screening - See Comments Rash     Neoprene rubber       Penicillins Rash       Family History   Problem Relation Age of Onset     Diabetes Mother      Blood Disease Mother         ITP      Kidney Disease Mother      Cancer Mother      Cardiovascular Father      Alcohol/Drug Father      Cerebrovascular Disease Father 70        ,     Dementia Father      Diabetes Maternal Grandmother      Thyroid Disease Maternal Grandmother      Cancer Paternal Grandmother         eye      Dementia Paternal Grandmother      Asthma Brother      Multiple Sclerosis Son 30     Additional medical/Social/Surgical histories reviewed in [facility] and updated as appropriate.     PHYSICAL EXAM  Vitals:    12/13/18 1813   Resp: 16   Weight: 112 kg (247 lb)   Height: 1.562 m (5' 1.5\")     Vital Signs: Resp 16   Ht 1.562 m (5' 1.5\")   Wt 112 kg (247 lb)   LMP  (LMP Unknown)   BMI " 45.91 kg/m    Patient declined being weighed. Body mass index is 45.91 kg/m .    General  - normal appearance, in no obvious distress  CV  - normal radial pulse  Pulm  - normal respiratory pattern, non-labored  Musculoskeletal - right wrist  - inspection: no atrophy, normal joint alignment, no swelling  - palpation: mild tenderness over the radiocarpal joint, no bony or soft tissue tenderness, no tenderness at the anatomical snuffbox  - ROM:  90 deg flexion   70 deg extension   25 deg abduction   65 deg adduction  - strength: 5/5  strength, 5/5 wrist abduction, 5/5 flexion, extension, pronation, supination, adduction  - special tests:  (-) Tinel's  (+) Finkelstein  (-) Phalen  (-) Ivan click test  (-) ulnar impaction  Neuro  - no numbness, no motor deficit, grossly normal coordination, normal muscle tone  Skin  - no ecchymosis, erythema, warmth, or induration, no obvious rash  Psych  - interactive, appropriate, normal mood and affect    ASSESSMENT & PLAN  57 yo female with right wrist dequervains tenosynovitis  Given injection today  Ordered hand therapy for wrist brace fabrication  Ice PRN  voltaren gel PRN    Hakan Ruano MD, CAQSM    PROCEDURE: right wrist dequervains injection     The patient was apprised of the risks and the benefits of the procedure and consented and a written consent was signed by the patient.   The patient s wrist was sterilely prepped with chloraprep   40 mg of triamcinolone suspension was drawn up into a 5 mL syringe with 1 mL of 1% lidocaine   The patient was injected with a 1.5-inch 25-gauge needle at right wrist radial syloid  There were no complications. The patient tolerated the procedure well. There was minimal bleeding.   The patient was instructed to ice the wrist upon leaving clinic and refrain from overuse over the next 3 days.   The patient was instructed to go to the emergency room with any usual pain, swelling, or redness occurred in the injected area.   The patient  was given a followup appointment to evaluate response to the injection to their increased range of motion and reduction of pain.    followup PRN  Dr Hakan Ruano

## 2018-12-13 NOTE — PROGRESS NOTES
SOAP note objective information for 12/13/2018.    Diagnosis: R small finger pain  DOI: 09/08/18     Precautions: neoprene    12/13/2018  NOTE: pt noting return of R Wrist pain      Subjective:    S:  Subjective changes as noted by patient: pt notes more pain at the R wrist today, which was gone last visit,  Pt notes she is having more of the R wrist pain that was dx per Dr. Ivan, which was not there at the last visit, per the pt.   Pt notes the hand splint has helped some, but still has pain and fatigue with too much use of the hand.   Functional changes noted by patient: Improvement in Work Tasks, still noting fatigue with typing  Response to previous treatment:  fair  Patient has noted adverse reaction to:   None        Functional Outcome Measure:   Upper Extremity Functional Index Score:  SCORE:   Column Totals: /80: 74   (A lower score indicates greater disability.)    Objective:  NOTE: 12/13/2018  Due to pt's report of R wrist pain at the radial aspect, pt was directed to the [facility] for further assessment per Dr. vIan's note of 11/20/18      Pain Level Report: Small finger   VAS(0-10) 12/4/2018 12/13/18   At Rest: 4/10 2/10   At worst: 4/10 4-6/10     Report of Pain:  Location:  small MCP finger  Pain Quality:  Aching and Dull, and can be sharp with a full fist  Frequency: constant    Pain is worst:  daytime  Exacerbated by:  Pressure, moving it, ,making a fist  Relieved by:  NSAIDs and rest  Progression:  Slow improvement    Finger Edema  Circumference:  (Measured in cm)    12/4/2018   Location: left right   P1 4.9 5.3   PIP 4.5 4.8   P2 4.3 4.6     Sensation: WNL throughout all nerve distributions; per patient report.  Sometimes gets numbness/tingling, but not often    ROM:  Pain Report:  - none    + mild    ++ moderate    +++ severe     Small Finger 12/4/2018 12/13    AROM(PROM) right right left   MCP 0/80 claw claw   PIP 0/88 90 93   DIP 0/82 80 88   LUNDBERG 250       Strength:   (Measured in  pounds)  Pain Report:  - none    + mild    ++ moderate    +++ severe     12/4/2018   Trials left right   1  2  3 54  51  41 49  52  51   Average: 49 51     A:: See flowsheet  Plan:  Frequency:  1 X week, once daily  Duration:  for 4 weeks tapering to 2 X a month over 8 weeks    Treatment Plan:   Modalities:  US, Fluidotherapy and Paraffin  Therapeutic Exercise:  AROM, AAROM, PROM, Tendon Gliding and Blocking  Manual Techniques:  Myofascial release  Orthotic Fabrication:  Hand based intrinsic plus orthosis  Self Care:  Self Care Tasks  Discharge Plan:  Achieve all LTG.  Independent in home treatment program.  Reach maximal therapeutic benefit.    Home Exercise Program:  Hand based intrinsic plus orthosis including ring and small fingers  Tendon gliding  Icing  12/13/2018  Victor M DURAN to  for day, continue night orthosis  Follow new orders after [facility] clinic    Next Visit:  Orthosis modifications  A/AA/PROM  MFR

## 2018-12-14 ENCOUNTER — THERAPY VISIT (OUTPATIENT)
Dept: OCCUPATIONAL THERAPY | Facility: CLINIC | Age: 58
End: 2018-12-14
Payer: COMMERCIAL

## 2018-12-14 DIAGNOSIS — M79.644 PAIN OF FINGER OF RIGHT HAND: ICD-10-CM

## 2018-12-14 DIAGNOSIS — M65.4 RADIAL STYLOID TENOSYNOVITIS (DE QUERVAIN): Primary | ICD-10-CM

## 2018-12-14 PROCEDURE — 97760 ORTHOTIC MGMT&TRAING 1ST ENC: CPT | Mod: GO | Performed by: OCCUPATIONAL THERAPIST

## 2018-12-14 RX ORDER — METHYLPREDNISOLONE ACETATE 40 MG/ML
40 INJECTION, SUSPENSION INTRA-ARTICULAR; INTRALESIONAL; INTRAMUSCULAR; SOFT TISSUE
Status: SHIPPED | OUTPATIENT
Start: 2018-12-13

## 2018-12-14 RX ORDER — LIDOCAINE HYDROCHLORIDE 10 MG/ML
1 INJECTION, SOLUTION EPIDURAL; INFILTRATION; INTRACAUDAL; PERINEURAL
Status: SHIPPED | OUTPATIENT
Start: 2018-12-13

## 2018-12-14 NOTE — PROGRESS NOTES
HISTORY OF PRESENT ILLNESS  Ms. Myers is a pleasant 58 year old year old female who presents to clinic today with right wrist pain  Natali explains that she has had this for 2 months  Location: right wrist  Quality:  achy pain    Severity: 5/10 at worst    Duration: see above  Timing: occurs intermittently    Modifying factors:  resting and non-use makes it better, movement and use makes it worse  Associated signs & symptoms: wrist swelling    Additional history: as documented    MEDICAL HISTORY  Patient Active Problem List   Diagnosis     Pre-op exam     Esophageal reflux (GERD)     BMI 40.0-44.9, adult (H)     Anemia     S/P total hysterectomy and bilateral salpingo-oophorectomy     CARDIOVASCULAR SCREENING; LDL GOAL LESS THAN 160     Acute ischemic stroke (H)     Stroke (cerebrum) (H)     Essential hypertension, benign     Status post placement of implantable loop recorder     Pain of finger of right hand     Radial styloid tenosynovitis (de quervain)       Current Outpatient Medications   Medication Sig Dispense Refill     diclofenac (VOLTAREN) 1 % topical gel Apply 4 grams to knees or 2 grams to hands four times daily using enclosed dosing card. 100 g 1     albuterol (PROAIR HFA/PROVENTIL HFA/VENTOLIN HFA) 108 (90 BASE) MCG/ACT Inhaler Inhale 2 puffs into the lungs every 6 hours as needed for shortness of breath / dyspnea or wheezing 1 Inhaler 1     atorvastatin (LIPITOR) 20 MG tablet Take 1 tablet (20 mg) by mouth daily 90 tablet 1     Blood Pressure Monitoring KIT 1 Device daily Pt to check BP readings daily at home 1 kit 0     cetirizine (ZYRTEC) 10 MG CHEW Take 1 tablet (10 mg) by mouth as needed 30 tablet 3     clopidogrel (PLAVIX) 75 MG tablet Take 1 tablet (75 mg) by mouth daily 90 tablet 3     DIPHENHYDRAMINE HCL PO Take 25 mg by mouth nightly as needed        fluticasone (FLONASE ALLERGY RELIEF) 50 MCG/ACT spray Spray 2 sprays into both nostrils daily 1 Bottle 3     KLOR-CON 10 MEQ tablet TAKE 2  "TABLETS (20 MEQ) BY MOUTH DAILY 180 tablet 0     lisinopril (PRINIVIL/ZESTRIL) 20 MG tablet TAKE 1 TABLET (20 MG) BY MOUTH 2 TIMES DAILY 180 tablet 1     montelukast (SINGULAIR) 10 MG tablet Take 1 tablet (10 mg) by mouth At Bedtime 30 tablet 1     naproxen sodium (ALEVE) 220 MG capsule        pantoprazole (PROTONIX) 40 MG EC tablet Take 1 tablet (40 mg) by mouth daily 90 tablet 3     triamcinolone (KENALOG) 0.1 % cream Apply topically 2 times daily 15 g 0       Allergies   Allergen Reactions     Mold      Seasonal Allergies      Hay fever     Codeine Rash     Latex Rash     Pt states this is NOT a latex allergy,allergy is neoprene rubber/in knee brace     No Clinical Screening - See Comments Rash     Neoprene rubber       Penicillins Rash       Family History   Problem Relation Age of Onset     Diabetes Mother      Blood Disease Mother         ITP      Kidney Disease Mother      Cancer Mother      Cardiovascular Father      Alcohol/Drug Father      Cerebrovascular Disease Father 70        ,     Dementia Father      Diabetes Maternal Grandmother      Thyroid Disease Maternal Grandmother      Cancer Paternal Grandmother         eye      Dementia Paternal Grandmother      Asthma Brother      Multiple Sclerosis Son 30       Additional medical/Social/Surgical histories reviewed in [facility] and updated as appropriate.     REVIEW OF SYSTEMS (12/14/2018)  10 point ROS of systems including Constitutional, Eyes, Respiratory, Cardiovascular, Gastroenterology, Genitourinary, Integumentary, Musculoskeletal, Psychiatric were all negative except for pertinent positives noted in my HPI.     PHYSICAL EXAM  Vitals:    12/13/18 1813   Resp: 16   Weight: 112 kg (247 lb)   Height: 1.562 m (5' 1.5\")     Vital Signs: Resp 16   Ht 1.562 m (5' 1.5\")   Wt 112 kg (247 lb)   LMP  (LMP Unknown)   BMI 45.91 kg/m   Patient declined being weighed. Body mass index is 45.91 kg/m .    General  - normal appearance, in no obvious distress  CV  - " normal radial pulse  Pulm  - normal respiratory pattern, non-labored  Musculoskeletal - right wrist  - inspection: no atrophy, normal joint alignment, no swelling  - palpation: mild tenderness over the radiocarpal joint, no bony or soft tissue tenderness, no tenderness at the anatomical snuffbox  - ROM:  90 deg flexion   70 deg extension   25 deg abduction   65 deg adduction  - strength: 5/5  strength, 5/5 wrist abduction, 5/5 flexion, extension, pronation, supination, adduction  - special tests:  (-) Tinel's  (+) Finkelstein  (-) Phalen  (-) Ivan click test  (-) ulnar impaction  Neuro  - no numbness, no motor deficit, grossly normal coordination, normal muscle tone  Skin  - no ecchymosis, erythema, warmth, or induration, no obvious rash  Psych  - interactive, appropriate, normal mood and affect    ASSESSMENT & PLAN  59 yo female with right wrist dequervains tenosynovitis  Given injection today  Ordered hand therapy for wrist brace fabrication  Ice PRN  voltaren gel PRN    Hakan Ruano MD, CAQSM    PROCEDURE: right wrist dequervains injection     The patient was apprised of the risks and the benefits of the procedure and consented and a written consent was signed by the patient.   The patient s wrist was sterilely prepped with chloraprep   40 mg of depo suspension was drawn up into a 5 mL syringe with 1 mL of 1% lidocaine   The patient was injected with a 1.5-inch 25-gauge needle at right wrist radial syloid  There were no complications. The patient tolerated the procedure well. There was minimal bleeding.   The patient was instructed to ice the wrist upon leaving clinic and refrain from overuse over the next 3 days.   The patient was instructed to go to the emergency room with any usual pain, swelling, or redness occurred in the injected area.   The patient was given a followup appointment to evaluate response to the injection to their increased range of motion and reduction of pain.    followup PRN    Hakan Ruano

## 2018-12-14 NOTE — NURSING NOTE
[address] [address]  Dept: [phone]  ______________________________________________________________________________    Patient: Natali Myers   : 1960   MRN: [id number]   2018    INVASIVE PROCEDURE SAFETY CHECKLIST    Date: 18   Procedure: right wrist DeQuervain's Syndrome Depo Injection  Patient Name: Natali Myers  MRN: [id number]  YOB: 1960    Action: Complete sections as appropriate. Any discrepancy results in a HARD COPY until resolved.     PRE PROCEDURE:  Patient ID verified with 2 identifiers (name and  or MRN): Yes  Procedure and site verified with patient/designee (when able): Yes  Accurate consent documentation in medical record: Yes  H&P (or appropriate assessment) documented in medical record: Yes  H&P must be up to 20 days prior to procedure and updates within 24 hours of procedure as applicable: NA  Relevant diagnostic and radiology test results appropriately labeled and displayed as applicable: Yes  Procedure site(s) marked with provider initials: NA    TIMEOUT:  Time-Out performed immediately prior to starting procedure, including verbal and active participation of all team members addressing the following:Yes  * Correct patient identify  * Confirmed that the correct side and site are marked  * An accurate procedure consent form  * Agreement on the procedure to be done  * Correct patient position  * Relevant images and results are properly labeled and appropriately displayed  * The need to administer antibiotics or fluids for irrigation purposes during the procedure as applicable   * Safety precautions based on patient history or medication use    DURING PROCEDURE: Verification of correct person, site, and procedures any time the responsibility for care of the patient is transferred to another member of the care team.     The following medication was given:     MEDICATION:  Depo  Medrol 40mg  ROUTE: intra-articular  SITE: right Wrist  DOSE: 1 mL  LOT #: [id number]  : [employer]  EXPIRATION DATE: 04/2019  NDC#: 4305-5982-36   Was there drug waste? No    MEDICATION:  Lidocaine without epinephrine  ROUTE: intra-articular  SITE: right Wrist  DOSE: 1 mL  LOT #: [id number]  : [employer]  EXPIRATION DATE: 06/22  NDC#: 80952-656-90   Was there drug waste? Yes  Amount of drug waste (mL): 29.  Reason for waste:  Single use vial    Rosy Tolliver ATC  December 13, 2018

## 2018-12-14 NOTE — PROGRESS NOTES
Hand Therapy Initial Evaluation    Current Date:  12/14/2018  Current DX: R small finger pain    NEW: Diagnosis:   Right wrist [place]ains  DOI:  11/20/18 (MD visit  DOS:  12/13/18  Procedure:   Injection to R Wrist  Referring MD:Hakan Ruano MD  CC: Lilli Ivan MD  Next MD visit: PRN    Initial Subjective:  Natali Myers is a 58 year old right hand dominant female.    Patient reports symptoms of pain, stiffness/loss of motion and edema of the right wrist which occurred due to over use of the R wrist. This was dx  In November, the wrist seemed to have resolved, then pt reported her R wrist pain returned with a nodule noted on her hand therapy visit on 12/13/18. She then attended the [facility] and received an injection to the R 1st Dorsal Compartment. She attends today for  Fabrication of the R wrist orthosis/thumb spica, as she has allergy to neoprene and requires custom orthosis  No change to her medical history  Noted on 12/4/18. See that note for medical hx  ALLERGIES: NEOPRENE    Functional Outcome Measure:   See flowsheet    Objective:      Pain:  VAS (0-10)  DATE: 12/14/2018      At Rest:  2/10      With Use: 5 /10      Location:wrist  Description: aching, sharp and shooting  Radiates: No  What Exacerbates Pain?:too much wrist motion  Worse (D/N): day and night  Frequency:daily  Relieves: rest      ROM:  No ROM of the R wrist today she will return in one week for ROM instruction per MD instruction after injection. Pt may remove the orthosis for light motion and hygiene, no formal ROM instructed today  Edema: mild of affected part    Sensation: [x]       WNL throughout all nerve distributions; per patient report         Assessment:   Patient presents with symptoms consistent with above diagnosis,  with non-surgical intervention.     Patient's limitations or Problem List includes:  Pain, Decreased ROM/motion and Increased edema of the right wrist and thumb which interferes with the patient's  ability to perform Self Care Tasks (dressing, eating, bathing, hygiene/toileting), Work Tasks, Sleep Patterns, Household Chores and Driving  as compared to previous level of function.    Rehab Potential:  Excellent - Return to full activity, no limitations    Patient will benefit from skilled Occupational Therapy to increase ROM, flexibility, overall strength, stability of wrist and stability of thumb and decrease pain, edema and adherence of scarring to return to previous activity level and resume normal daily tasks and to reach their rehab potential.    Barriers to Learning:  No barrier    Communication Issues:  Patient appears to be able to clearly communicate and understand verbal and written communication and follow directions correctly.      Chart Review: Chart Review, Brief history including review of medical and/or therapy records relating to the presenting problem and Simple history review with patient    Identified Performance Deficits: bathing/showering, toileting, dressing, hygiene and grooming, driving and community mobility, home establishment and management, Latter day and spiritual activities and expression, sleep, work and play    Assessment of Occupational Performance:  5 or more Performance Deficits    Clinical Decision Making (Complexity): Low complexity      Treatment Explanation:  The following has been discussed with the patient:  RX ordered/plan of care  Anticipated outcomes  Possible risks and side effects        Treatment Plan:   Frequency:  1 X week, once daily  Duration:  for 6 weeks     Therapeutic Exercise:  AROM, AAROM and Tendon Gliding  Manual Techniques:  Friction massage and Myofascial release  Self Care:  Ergonomic Considerations  Orthotic Fabrication: Static forearm based thumb spica orthosis    Discharge Plan:  Achieve all LTG.  Independent in home treatment program.  Reach maximal therapeutic benefit.    Home Exercise Program:  Wear orthosis full time, remove for gentle and  painfree ROM wrist and thumb   Continue to wear Small to ring charley strap and hand based orthosis for night

## 2018-12-14 NOTE — PROGRESS NOTES
SPORTS & ORTHOPEDIC WALK-IN VISIT 12/13/2018    Primary Care Physician: Dr. Ivan    In to see therapy for the last week for right fifth mcp joint. Happened in September, had gotten better. A few months ago she got a very tender spot on radial side wrist that feels like a severe bruise.Lasted about seven weeks before starting to get better. Was okay last week but is now bothering her again. States theres a bump. Does a lot of typing at work. Sore across entire dorsal wrist with full flexion. Now also starting to notice it more frequently.     Reason for visit:     What part of your body is injured / painful?  right wrist    What caused the injury /pain? Unsure    How long ago did your injury occur or pain begin? a week ago    What are your most bothersome symptoms? Pain    How would you characterize your symptom?  Aching most of the time and sharp with palpation    What makes your symptoms better? Ibuprofen    What makes your symptoms worse? Certain Movement, palpation    Have you been previously seen for this problem? Yes, PCP, hand therapy    Medical History:    Any recent changes to your medical history? No    Any new medication prescribed since last visit? No    Have you had surgery on this body part before? Yes carpal tunnel release 20 years ago    Social History:    Occupation:     Handedness: Right    Exercise:     Review of Systems:    Do you have fever, chills, weight loss? No    Do you have any vision problems? No    Do you have any chest pain or edema? No    Do you have any shortness of breath or wheezing?  No    Do you have stomach problems? Yes, Reflux Disease    Do you have any numbness or focal weakness? No    Do you have diabetes? No    Do you have problems with bleeding or clotting? Yes, history of stroke, takes plavix     Do you have an rashes or other skin lesions? No       Arthrocentesis  Date/Time: 12/13/2018 7:07 PM  Performed by: Hakan Ruano,  MD  Authorized by: Hakan Ruano MD     Indications:  Pain  Needle Size:  25 G  Guidance: ultrasound    Location:  Wrist  Medications:  1 mL lidocaine (PF) 1 %; 40 mg methylPREDNISolone 40 MG/ML  Outcome:  Tolerated well, no immediate complications  Procedure discussed: discussed risks, benefits, and alternatives    Consent Given by:  Patient  Timeout: timeout called immediately prior to procedure    Prep: patient was prepped and draped in usual sterile fashion          Patient seen and examined, agree with above and I performed the procedure injection.Dr Ruano

## 2018-12-20 ENCOUNTER — THERAPY VISIT (OUTPATIENT)
Dept: OCCUPATIONAL THERAPY | Facility: CLINIC | Age: 58
End: 2018-12-20
Payer: COMMERCIAL

## 2018-12-20 DIAGNOSIS — M65.4 RADIAL STYLOID TENOSYNOVITIS (DE QUERVAIN): ICD-10-CM

## 2018-12-20 DIAGNOSIS — M79.644 PAIN OF FINGER OF RIGHT HAND: ICD-10-CM

## 2018-12-20 DIAGNOSIS — M25.531 RIGHT WRIST PAIN: Primary | ICD-10-CM

## 2018-12-20 PROCEDURE — 97110 THERAPEUTIC EXERCISES: CPT | Mod: GO | Performed by: OCCUPATIONAL THERAPIST

## 2018-12-20 PROCEDURE — 97140 MANUAL THERAPY 1/> REGIONS: CPT | Mod: GO | Performed by: OCCUPATIONAL THERAPIST

## 2018-12-23 DIAGNOSIS — K21.9 GASTROESOPHAGEAL REFLUX DISEASE WITHOUT ESOPHAGITIS: ICD-10-CM

## 2018-12-26 RX ORDER — PANTOPRAZOLE SODIUM 40 MG/1
TABLET, DELAYED RELEASE ORAL
Qty: 90 TABLET | Refills: 3 | Status: SHIPPED | OUTPATIENT
Start: 2018-12-26 | End: 2019-11-11

## 2019-01-08 ENCOUNTER — OFFICE VISIT (OUTPATIENT)
Dept: NEUROLOGY | Facility: CLINIC | Age: 59
End: 2019-01-08
Payer: COMMERCIAL

## 2019-01-08 VITALS
HEART RATE: 76 BPM | DIASTOLIC BLOOD PRESSURE: 71 MMHG | RESPIRATION RATE: 16 BRPM | SYSTOLIC BLOOD PRESSURE: 133 MMHG | OXYGEN SATURATION: 95 %

## 2019-01-08 DIAGNOSIS — I63.139 CEREBROVASCULAR ACCIDENT (CVA) DUE TO EMBOLISM OF CAROTID ARTERY, UNSPECIFIED BLOOD VESSEL LATERALITY (H): Primary | ICD-10-CM

## 2019-01-08 ASSESSMENT — PAIN SCALES - GENERAL: PAINLEVEL: NO PAIN (0)

## 2019-01-08 NOTE — PATIENT INSTRUCTIONS
1. MRI in 1 year  2. Follow-up after that                                    1. MRI 1 year  2. RTC after kristofer

## 2019-01-08 NOTE — LETTER
1/8/2019       RE: Natali Myers  [address] [facility] [address]     Dear Colleague,    Thank you for referring your patient, Natail Myers, to the [facility] NEUROLOGY at [facility]. Please see a copy of my visit note below.          [facility] NEUROLOGY  [address]  3rd [address]  [place] [address]      January 8, 2019    Natali Myers                                                                                                                     [address] [facility] [address]      Ms. Myers is a very pleasant 58 year old lady who has had at least 2 symptomatic strokes. The first stroke was in 2016 where she presented with a left leg numbness and weakness and a brain MRI showed a small infarct close to or touching the central sulcus and extending into the fronto-parietal white matter. Head MRA did not show any explanatory stenosis and neck MRA was significant for stenosis of the origins of the vertebral arteries but otherwise unremarkable. Her second stroke was in January of 2018 when she presented with L sided weakness and an NIHSS of 11. There was extinction, weakness, sensory loss and dysarthria. CTA suggested M2 occlusion and she received IV tPA and also mechanical thrombectomy.    Details of work up and such are described in my notes from April 3, 24 2018 and June 26, 2018. She has had a RUSTY and now has an ILR after short term monitoring did nt show AFIB. SHe has had a hypercoagulable work up and has seen Hematology who did not feel that she had a specific hypercoagulable state.    ESR and CRP were normal. We discussed a LP and decided to defer it since her last stroke was a year ago. She brought her home BP today and all the systolics were well below 130 mm Hg.     Shs is on clopidogrel and a statin and anti-HTN medications. Not a smoker.    ASSESSMENT / PLAN    Encounter Diagnosis   Name Primary?     Cerebrovascular  accident (CVA) due to embolism of carotid artery, unspecified blood vessel laterality (H) Yes     Orders Placed This Encounter   Procedures     MRI Brain for stroke complete     Repeat brain MRI in 1 year and RTC after that. She will call if there are other issues. Her ILR is being monitored.      Past Medical History:   Diagnosis Date     Arthritis     OA left hip, knees, wrists     Asthma with bronchitis      Chronic headaches      GERD (gastroesophageal reflux disease)     omprazole     History of stroke without residual deficits 1/31/2018    headaches     Hypertension      Migraines     On-going, 1 every month or 2     Mitral stenosis 2018    mild, no murmur, rheumatic     Neck pain     trapezius tightness     Seasonal allergies     mold and grass     Stroke (H) 07/18/2016    MRI scan that showed recent infarct in the central white matter of the right frontoparietal junction;         Current Outpatient Medications   Medication     albuterol (PROAIR HFA/PROVENTIL HFA/VENTOLIN HFA) 108 (90 BASE) MCG/ACT Inhaler     atorvastatin (LIPITOR) 20 MG tablet     Blood Pressure Monitoring KIT     cetirizine (ZYRTEC) 10 MG CHEW     clopidogrel (PLAVIX) 75 MG tablet     diclofenac (VOLTAREN) 1 % topical gel     DIPHENHYDRAMINE HCL PO     fluticasone (FLONASE ALLERGY RELIEF) 50 MCG/ACT spray     KLOR-CON 10 MEQ tablet     lisinopril (PRINIVIL/ZESTRIL) 20 MG tablet     montelukast (SINGULAIR) 10 MG tablet     naproxen sodium (ALEVE) 220 MG capsule     pantoprazole (PROTONIX) 40 MG EC tablet     triamcinolone (KENALOG) 0.1 % cream     Current Facility-Administered Medications   Medication     lidocaine (PF) (XYLOCAINE) 1 % injection 1 mL     methylPREDNISolone (DEPO-MEDROL) injection 40 mg     EXAM    /71 (BP Location: Right arm, Patient Position: Chair, Cuff Size: Adult Regular)   Pulse 76   Resp 16   LMP  (LMP Unknown)   SpO2 95%     Orientation: Normal; Language normal; Attention: DLROW; normal  Cranial nerves:  2-12 examined normal  Motor: FFM normal bilaterally; No drift; Strength normal in both UE and LE; SA EF EE WE FE; HF KF KE DF EHL  Sensory: no deficits to LT  Co-ordination normal in both UE and LE  Reflexes symmetric  Gait: normal base steady can walk on heels toes and tandem without difficulty    José Miguel Snyder MD

## 2019-01-08 NOTE — NURSING NOTE
Chief Complaint   Patient presents with     RECHECK     [facility] RETURN STROKE        Lyudmila Cordon, EMT

## 2019-01-08 NOTE — PROGRESS NOTES
[facility] NEUROLOGY  [address]  3rd [address]  [place] [address]          January 8, 2019    Natali Myers                                                                                                                     [address] [facility] [address]          Ms. Myers is a very pleasant 58 year old lady who has had at least 2 symptomatic strokes. The first stroke was in 2016 where she presented with a left leg numbness and weakness and a brain MRI showed a small infarct close to or touching the central sulcus and extending into the fronto-parietal white matter. Head MRA did not show any explanatory stenosis and neck MRA was significant for stenosis of the origins of the vertebral arteries but otherwise unremarkable. Her second stroke was in January of 2018 when she presented with L sided weakness and an NIHSS of 11. There was extinction, weakness, sensory loss and dysarthria. CTA suggested M2 occlusion and she received IV tPA and also mechanical thrombectomy.    Details of work up and such are described in my notes from April 3, 24 2018 and June 26, 2018. She has had a RUSTY and now has an ILR after short term monitoring did nt show AFIB. SHe has had a hypercoagulable work up and has seen Hematology who did not feel that she had a specific hypercoagulable state.    ESR and CRP were normal. We discussed a LP and decided to defer it since her last stroke was a year ago. She brought her home BP today and all the systolics were well below 130 mm Hg.     Shs is on clopidogrel and a statin and anti-HTN medications. Not a smoker.    ASSESSMENT / PLAN    Encounter Diagnosis   Name Primary?     Cerebrovascular accident (CVA) due to embolism of carotid artery, unspecified blood vessel laterality (H) Yes     Orders Placed This Encounter   Procedures     MRI Brain for stroke complete     Repeat brain MRI in 1 year and RTC after that. She will call if there are other issues. Her ILR is  being monitored.        Past Medical History:   Diagnosis Date     Arthritis     OA left hip, knees, wrists     Asthma with bronchitis      Chronic headaches      GERD (gastroesophageal reflux disease)     omprazole     History of stroke without residual deficits 1/31/2018    headaches     Hypertension      Migraines     On-going, 1 every month or 2     Mitral stenosis 2018    mild, no murmur, rheumatic     Neck pain     trapezius tightness     Seasonal allergies     mold and grass     Stroke (H) 07/18/2016    MRI scan that showed recent infarct in the central white matter of the right frontoparietal junction;             Current Outpatient Medications   Medication     albuterol (PROAIR HFA/PROVENTIL HFA/VENTOLIN HFA) 108 (90 BASE) MCG/ACT Inhaler     atorvastatin (LIPITOR) 20 MG tablet     Blood Pressure Monitoring KIT     cetirizine (ZYRTEC) 10 MG CHEW     clopidogrel (PLAVIX) 75 MG tablet     diclofenac (VOLTAREN) 1 % topical gel     DIPHENHYDRAMINE HCL PO     fluticasone (FLONASE ALLERGY RELIEF) 50 MCG/ACT spray     KLOR-CON 10 MEQ tablet     lisinopril (PRINIVIL/ZESTRIL) 20 MG tablet     montelukast (SINGULAIR) 10 MG tablet     naproxen sodium (ALEVE) 220 MG capsule     pantoprazole (PROTONIX) 40 MG EC tablet     triamcinolone (KENALOG) 0.1 % cream     Current Facility-Administered Medications   Medication     lidocaine (PF) (XYLOCAINE) 1 % injection 1 mL     methylPREDNISolone (DEPO-MEDROL) injection 40 mg     EXAM    /71 (BP Location: Right arm, Patient Position: Chair, Cuff Size: Adult Regular)   Pulse 76   Resp 16   LMP  (LMP Unknown)   SpO2 95%     Orientation: Normal; Language normal; Attention: DLROW; normal  Cranial nerves: 2-12 examined normal  Motor: FFM normal bilaterally; No drift; Strength normal in both UE and LE; SA EF EE WE FE; HF KF KE DF EHL  Sensory: no deficits to LT  Co-ordination normal in both UE and LE  Reflexes symmetric  Gait: normal base steady can walk on heels toes and  tandem without difficulty        José Miguel Snyder MD

## 2019-02-08 DIAGNOSIS — I10 BENIGN ESSENTIAL HYPERTENSION: ICD-10-CM

## 2019-02-09 DIAGNOSIS — R05.9 COUGH: ICD-10-CM

## 2019-02-10 RX ORDER — LISINOPRIL 20 MG/1
20 TABLET ORAL 2 TIMES DAILY
Qty: 180 TABLET | Refills: 2 | Status: SHIPPED | OUTPATIENT
Start: 2019-02-10 | End: 2019-11-11

## 2019-02-10 NOTE — TELEPHONE ENCOUNTER
lisinopril (PRINIVIL/ZESTRIL) 20 MG tablet  Last Written Prescription Date:  8/13/18  Last Fill Quantity: 180,   # refills: 1  Last Office Visit :11/20/18  Future Office visit: 2/26/19

## 2019-02-12 RX ORDER — MONTELUKAST SODIUM 10 MG/1
10 TABLET ORAL AT BEDTIME
Qty: 30 TABLET | Refills: 1 | Status: SHIPPED | OUTPATIENT
Start: 2019-02-12 | End: 2019-04-20

## 2019-02-12 NOTE — TELEPHONE ENCOUNTER
montelukast (SINGULAIR) 10 MG tablet   Last Written Prescription Date:  11/20/18  Last Fill Quantity: 30,   # refills: 1  Last Office Visit :11/20/18  Future Office visit:  2/26/19      Routing refill request to provider for review/approval because:  ACT

## 2019-02-20 DIAGNOSIS — E87.6 HYPOKALEMIA: ICD-10-CM

## 2019-02-20 RX ORDER — POTASSIUM CHLORIDE 750 MG/1
20 TABLET, EXTENDED RELEASE ORAL DAILY
Qty: 180 TABLET | Refills: 2 | Status: SHIPPED | OUTPATIENT
Start: 2019-02-20 | End: 2019-11-06

## 2019-02-26 ENCOUNTER — OFFICE VISIT (OUTPATIENT)
Dept: INTERNAL MEDICINE | Facility: CLINIC | Age: 59
End: 2019-02-26
Payer: COMMERCIAL

## 2019-02-26 VITALS
SYSTOLIC BLOOD PRESSURE: 120 MMHG | DIASTOLIC BLOOD PRESSURE: 79 MMHG | HEART RATE: 98 BPM | BODY MASS INDEX: 45.45 KG/M2 | WEIGHT: 244.5 LBS

## 2019-02-26 DIAGNOSIS — L30.0 NUMMULAR ECZEMA: Primary | ICD-10-CM

## 2019-02-26 DIAGNOSIS — R21 RASH: ICD-10-CM

## 2019-02-26 RX ORDER — TRIAMCINOLONE ACETONIDE 1 MG/G
CREAM TOPICAL 2 TIMES DAILY
Qty: 45 G | Refills: 3 | Status: SHIPPED | OUTPATIENT
Start: 2019-02-26 | End: 2020-01-30

## 2019-02-26 ASSESSMENT — PAIN SCALES - GENERAL: PAINLEVEL: NO PAIN (0)

## 2019-02-26 NOTE — PATIENT INSTRUCTIONS
[facility] Medication Refill Request Information:  * Please contact your pharmacy regarding ANY request for medication refills.  ** [facility] Prescription Fax = [phone]  * Please allow 3 business days for routine medication refills.  * Please allow 5 business days for controlled substance medication refills.     [facility] Test Result notification information:  *You will be notified with in 7-10 days of your appointment day regarding the results of your test.  If you are on MyChart you will be notified as soon as the provider has reviewed the results and signed off on them.    [facility]: [phone]

## 2019-02-26 NOTE — NURSING NOTE
Chief Complaint   Patient presents with     Derm Problem     pt states she has itchy dry patches in various spots on right side       Maisha Reddy CMA at 4:51 PM on 2/26/2019.

## 2019-02-26 NOTE — PROGRESS NOTES
[facility]  Primary Care Center   Lilli Ivan MD  02/26/2019      Chief Complaint:   Derm Problem       History of Present Illness:   Natali Myers is a 59 year old female with a history of CVA times two, migraines, and GERD who presents for evaluation of derm problem. Her main concern today is with itchy patches throughout her body. She says there is two on her hip, one behind her right knee and one on the right elbow. She has tried over the counter Cortisone cream but the medication did not work on any of these spots. Last visit she did have an itchy spot on her left breast but this one did subside with Kenalog, however she does have to use it everyday so it is not itchy.     Essential hypertension:Natali has been recording her blood pressures at home. She has been doing great with this and averages around 112s systolic. She feels much better now and it is well controlled on Lisinopril.      History of CVA times two:  On dual antiplatelet therapy. Followed by neurology and cardiology. Has ILD which is been recently interrogated and there is no evidence for atrial fibrillation. She has had no TIAs. She said new complete recovery from acute ischemic stroke.    Other concerns discussed:  1. Would love to lose some weight, it is steady at the same weight, not going up but also not going down. Walks about a mile a day.   2. Refilled medications and reviewed medication list       Review of Systems:   Pertinent items are noted in HPI, remainder of complete ROS is negative.      Active Medications:     Current Outpatient Medications:      albuterol (PROAIR HFA/PROVENTIL HFA/VENTOLIN HFA) 108 (90 BASE) MCG/ACT Inhaler, Inhale 2 puffs into the lungs every 6 hours as needed for shortness of breath / dyspnea or wheezing, Disp: 1 Inhaler, Rfl: 1     atorvastatin (LIPITOR) 20 MG tablet, Take 1 tablet (20 mg) by mouth daily, Disp: 90 tablet, Rfl: 1     Blood Pressure Monitoring KIT, 1 Device daily Pt to check BP  readings daily at home, Disp: 1 kit, Rfl: 0     cetirizine (ZYRTEC) 10 MG CHEW, Take 1 tablet (10 mg) by mouth as needed, Disp: 30 tablet, Rfl: 3     clopidogrel (PLAVIX) 75 MG tablet, Take 1 tablet (75 mg) by mouth daily, Disp: 90 tablet, Rfl: 3     diclofenac (VOLTAREN) 1 % topical gel, Apply 4 grams to knees or 2 grams to hands four times daily using enclosed dosing card., Disp: 100 g, Rfl: 1     DIPHENHYDRAMINE HCL PO, Take 25 mg by mouth nightly as needed , Disp: , Rfl:      fluticasone (FLONASE ALLERGY RELIEF) 50 MCG/ACT spray, Spray 2 sprays into both nostrils daily, Disp: 1 Bottle, Rfl: 3     lisinopril (PRINIVIL/ZESTRIL) 20 MG tablet, Take 1 tablet (20 mg) by mouth 2 times daily (Patient taking differently: Take 20 mg by mouth daily ), Disp: 180 tablet, Rfl: 2     montelukast (SINGULAIR) 10 MG tablet, Take 1 tablet (10 mg) by mouth At Bedtime, Disp: 30 tablet, Rfl: 1     naproxen sodium (ALEVE) 220 MG capsule, , Disp: , Rfl:      pantoprazole (PROTONIX) 40 MG EC tablet, TAEK 1 TABLET BY MOUTH EVERY DAY , Disp: 90 tablet, Rfl: 3     potassium chloride ER (KLOR-CON) 10 MEQ CR tablet, Take 2 tablets (20 mEq) by mouth daily, Disp: 180 tablet, Rfl: 2     triamcinolone (KENALOG) 0.1 % external cream, Apply topically 2 times daily, Disp: 45 g, Rfl: 3    Current Facility-Administered Medications:      lidocaine (PF) (XYLOCAINE) 1 % injection 1 mL, 1 mL, , , Hakan Ruano MD, 1 mL at 12/13/18 1907     methylPREDNISolone (DEPO-MEDROL) injection 40 mg, 40 mg, , , Hakan Ruano MD, 40 mg at 12/13/18 1907      Allergies:   Mold; Seasonal allergies; Codeine; Latex; No clinical screening - see comments; and Penicillins      Past Medical History:  Acute ischemic stroke   Anemia  Arthritis  Asthma with bronchitis  Chronic headaches  Gastro-esophageal reflux symptoms  Hypertension  Migraines  Mitral stenosis  Obesity  Radial styloid tenosynovitis (de quervain)  Seasonal allergies     Past Surgical  History:  Dilation and curettage (5/2014)  Hysterectomy total abdominal (6/2014)  Knee surgery, bilateral  Release carpal tunnel, bilaterally (1996)  Salpingo-oophorectomy, bilateral (6/2014)  Wrist surgery     Family History:   Diabetes - mother, maternal grandmother   Blood disease, ITP - mother  Kidney disease - mother  Cancer - mother  Cardiovascular - father  Alcohol/drugs - father  Cerebrovascular disease - father  Dementia - father, paternal grandmother   Eye cancer - paternal grandmother   Thyroid disease - maternal grandmother   Asthma - brother  Multiple sclerosis - son      Social History:   Tobacco Use: none  Alcohol Use: once a month  PCP: Lilli Ivan      Physical Exam:   /79 (BP Location: Right arm, Patient Position: Sitting, Cuff Size: Adult Large)   Pulse 98   Wt 110.9 kg (244 lb 8 oz)   LMP  (LMP Unknown)   BMI 45.45 kg/m     Constitutional: Healthy, alert, no distress and cooperative  Skin: 1.5 by 1 macular flat dry slightly scaly barely pinker than the skin nummular eczema on the right arm. To similar lesions on the right buttock.     Assessment and Plan:  Nummular eczema  Lesions on skin suggestive of nummular eczema. We prescribed the same cream that helped clear up her breast itchiness. If any side effects occurred we asked her to let us know.   - triamcinolone (KENALOG) 0.1 % external cream  Dispense: 45 g; Refill: 3    Essential hypertension  Well-controlled.    History of CVA times two  Risk factors are well-controlled. No TIAs.  No evidence of arrhythmia.     Obesity  We talked about trying to get a little bit more exercise. She walks to and from her parking lot at work. She tries to climb stairs. I applauded her for doing this. I asked her to find some way to stay even more active as well and she will think about that.  Follow-up: In one year (around 2/26/2020).        I spent a total of 15 minutes face-to-face with Natali Myers during today's office visit.  Over  50% of this time was spent counseling the patient and/or coordinating care regarding hypertension and eczema.  See note for details.    Scribe Disclosure:   I, Freya Regine, am serving as a scribe to document services personally performed by Lilli Ivan MD at this visit, based upon the provider's statements to me. All documentation has been reviewed by the aforementioned provider prior to being entered into the official medical record.     Portions of this medical record were completed by a scribe. UPON MY REVIEW AND AUTHENTICATION BY ELECTRONIC SIGNATURE, this confirms (a) I performed the applicable clinical services, and (b) the record is accurate.     Lilli Ivan MD

## 2019-03-08 ENCOUNTER — ANCILLARY PROCEDURE (OUTPATIENT)
Dept: CARDIOLOGY | Facility: CLINIC | Age: 59
End: 2019-03-08
Attending: INTERNAL MEDICINE
Payer: COMMERCIAL

## 2019-03-08 DIAGNOSIS — I63.9 CRYPTOGENIC STROKE (H): ICD-10-CM

## 2019-03-08 PROCEDURE — 93299 CARDIAC DEVICE CHECK - REMOTE: CPT | Mod: ZF

## 2019-03-08 PROCEDURE — 93299 CARDIAC DEVICE CHECK - REMOTE: CPT | Performed by: INTERNAL MEDICINE

## 2019-03-28 LAB
MDC_IDC_MSMT_BATTERY_STATUS: NORMAL
MDC_IDC_PG_IMPLANT_DTM: NORMAL
MDC_IDC_PG_MFG: NORMAL
MDC_IDC_PG_MODEL: NORMAL
MDC_IDC_PG_SERIAL: NORMAL
MDC_IDC_PG_TYPE: NORMAL
MDC_IDC_SESS_CLINIC_NAME: NORMAL
MDC_IDC_SESS_DTM: NORMAL
MDC_IDC_SESS_TYPE: NORMAL
MDC_IDC_SET_ZONE_DETECTION_INTERVAL: 2000 MS
MDC_IDC_SET_ZONE_DETECTION_INTERVAL: 3000 MS
MDC_IDC_SET_ZONE_DETECTION_INTERVAL: 350 MS
MDC_IDC_SET_ZONE_TYPE: NORMAL
MDC_IDC_STAT_AT_BURDEN_PERCENT: 0 %
MDC_IDC_STAT_AT_DTM_END: NORMAL
MDC_IDC_STAT_AT_DTM_START: NORMAL
MDC_IDC_STAT_EPISODE_RECENT_COUNT: 0
MDC_IDC_STAT_EPISODE_RECENT_COUNT: 1
MDC_IDC_STAT_EPISODE_RECENT_COUNT_DTM_END: NORMAL
MDC_IDC_STAT_EPISODE_RECENT_COUNT_DTM_START: NORMAL
MDC_IDC_STAT_EPISODE_TOTAL_COUNT: 0
MDC_IDC_STAT_EPISODE_TOTAL_COUNT: 1
MDC_IDC_STAT_EPISODE_TOTAL_COUNT_DTM_END: NORMAL
MDC_IDC_STAT_EPISODE_TOTAL_COUNT_DTM_START: NORMAL
MDC_IDC_STAT_EPISODE_TYPE: NORMAL

## 2019-04-20 DIAGNOSIS — R05.9 COUGH: ICD-10-CM

## 2019-04-23 RX ORDER — MONTELUKAST SODIUM 10 MG/1
10 TABLET ORAL AT BEDTIME
Qty: 30 TABLET | Refills: 1 | Status: SHIPPED | OUTPATIENT
Start: 2019-04-23 | End: 2019-05-18

## 2019-04-23 NOTE — TELEPHONE ENCOUNTER
montelukast (SINGULAIR) 10 MG tablet   Last Written Prescription Date:  2/12/19  Last Fill Quantity: 30,   # refills: 1  Last Office Visit : 2/26/19  Future Office visit:  none    30 day to pharmacy.  Scheduling has been notified to contact the pt for appointment.

## 2019-05-18 DIAGNOSIS — R05.9 COUGH: ICD-10-CM

## 2019-05-18 DIAGNOSIS — I63.9 CEREBROVASCULAR ACCIDENT (CVA), UNSPECIFIED MECHANISM (H): ICD-10-CM

## 2019-05-19 RX ORDER — MONTELUKAST SODIUM 10 MG/1
10 TABLET ORAL AT BEDTIME
Qty: 90 TABLET | Refills: 2 | Status: SHIPPED | OUTPATIENT
Start: 2019-05-19 | End: 2020-01-30

## 2019-05-19 NOTE — TELEPHONE ENCOUNTER
Last Clinic Visit: 2/26/2019  [facility]    *not asthma

## 2019-05-20 RX ORDER — ATORVASTATIN CALCIUM 20 MG/1
20 TABLET, FILM COATED ORAL DAILY
Qty: 90 TABLET | Refills: 1 | Status: SHIPPED | OUTPATIENT
Start: 2019-05-20 | End: 2019-11-11

## 2019-06-14 ENCOUNTER — ANCILLARY PROCEDURE (OUTPATIENT)
Dept: CARDIOLOGY | Facility: CLINIC | Age: 59
End: 2019-06-14
Attending: INTERNAL MEDICINE
Payer: COMMERCIAL

## 2019-06-14 DIAGNOSIS — I63.9 CRYPTOGENIC STROKE (H): ICD-10-CM

## 2019-06-14 PROCEDURE — 93298 REM INTERROG DEV EVAL SCRMS: CPT | Performed by: INTERNAL MEDICINE

## 2019-06-14 PROCEDURE — 93299 CARDIAC DEVICE CHECK - REMOTE: CPT | Mod: ZF

## 2019-07-11 LAB
MDC_IDC_MSMT_BATTERY_DTM: NORMAL
MDC_IDC_MSMT_BATTERY_STATUS: NORMAL
MDC_IDC_PG_IMPLANT_DTM: NORMAL
MDC_IDC_PG_MFG: NORMAL
MDC_IDC_PG_MODEL: NORMAL
MDC_IDC_PG_SERIAL: NORMAL
MDC_IDC_PG_TYPE: NORMAL
MDC_IDC_SESS_CLINIC_NAME: NORMAL
MDC_IDC_SESS_DTM: NORMAL
MDC_IDC_SESS_TYPE: NORMAL
MDC_IDC_SET_ZONE_DETECTION_INTERVAL: 2000 MS
MDC_IDC_SET_ZONE_DETECTION_INTERVAL: 3000 MS
MDC_IDC_SET_ZONE_DETECTION_INTERVAL: 350 MS
MDC_IDC_SET_ZONE_TYPE: NORMAL
MDC_IDC_STAT_AT_BURDEN_PERCENT: 0 %
MDC_IDC_STAT_AT_DTM_END: NORMAL
MDC_IDC_STAT_AT_DTM_START: NORMAL
MDC_IDC_STAT_EPISODE_RECENT_COUNT: 0
MDC_IDC_STAT_EPISODE_RECENT_COUNT_DTM_END: NORMAL
MDC_IDC_STAT_EPISODE_RECENT_COUNT_DTM_START: NORMAL
MDC_IDC_STAT_EPISODE_TOTAL_COUNT: 0
MDC_IDC_STAT_EPISODE_TOTAL_COUNT: 1
MDC_IDC_STAT_EPISODE_TOTAL_COUNT_DTM_END: NORMAL
MDC_IDC_STAT_EPISODE_TOTAL_COUNT_DTM_START: NORMAL
MDC_IDC_STAT_EPISODE_TYPE: NORMAL

## 2019-09-11 ENCOUNTER — ANCILLARY PROCEDURE (OUTPATIENT)
Dept: CARDIOLOGY | Facility: CLINIC | Age: 59
End: 2019-09-11
Attending: INTERNAL MEDICINE
Payer: COMMERCIAL

## 2019-09-11 DIAGNOSIS — I63.9 CRYPTOGENIC STROKE (H): ICD-10-CM

## 2019-09-11 PROCEDURE — 93298 REM INTERROG DEV EVAL SCRMS: CPT | Performed by: INTERNAL MEDICINE

## 2019-09-11 PROCEDURE — 93299 CARDIAC DEVICE CHECK - REMOTE: CPT | Mod: ZF

## 2019-09-12 LAB
MDC_IDC_MSMT_BATTERY_STATUS: NORMAL
MDC_IDC_PG_IMPLANT_DTM: NORMAL
MDC_IDC_PG_MFG: NORMAL
MDC_IDC_PG_MODEL: NORMAL
MDC_IDC_PG_SERIAL: NORMAL
MDC_IDC_PG_TYPE: NORMAL
MDC_IDC_SESS_CLINIC_NAME: NORMAL
MDC_IDC_SESS_DTM: NORMAL
MDC_IDC_SESS_TYPE: NORMAL
MDC_IDC_SET_ZONE_DETECTION_INTERVAL: 2000 MS
MDC_IDC_SET_ZONE_DETECTION_INTERVAL: 3000 MS
MDC_IDC_SET_ZONE_DETECTION_INTERVAL: 350 MS
MDC_IDC_SET_ZONE_TYPE: NORMAL
MDC_IDC_STAT_AT_BURDEN_PERCENT: 0 %
MDC_IDC_STAT_AT_DTM_END: NORMAL
MDC_IDC_STAT_AT_DTM_START: NORMAL
MDC_IDC_STAT_EPISODE_RECENT_COUNT: 0
MDC_IDC_STAT_EPISODE_RECENT_COUNT_DTM_END: NORMAL
MDC_IDC_STAT_EPISODE_RECENT_COUNT_DTM_START: NORMAL
MDC_IDC_STAT_EPISODE_TOTAL_COUNT: 0
MDC_IDC_STAT_EPISODE_TOTAL_COUNT: 1
MDC_IDC_STAT_EPISODE_TOTAL_COUNT_DTM_END: NORMAL
MDC_IDC_STAT_EPISODE_TOTAL_COUNT_DTM_START: NORMAL
MDC_IDC_STAT_EPISODE_TYPE: NORMAL

## 2019-10-04 ENCOUNTER — HEALTH MAINTENANCE LETTER (OUTPATIENT)
Age: 59
End: 2019-10-04

## 2019-10-09 ENCOUNTER — DOCUMENTATION ONLY (OUTPATIENT)
Dept: CARE COORDINATION | Facility: CLINIC | Age: 59
End: 2019-10-09

## 2019-11-06 DIAGNOSIS — E87.6 HYPOKALEMIA: ICD-10-CM

## 2019-11-06 RX ORDER — POTASSIUM CHLORIDE 750 MG/1
20 TABLET, EXTENDED RELEASE ORAL DAILY
Qty: 180 TABLET | Refills: 0 | Status: SHIPPED | OUTPATIENT
Start: 2019-11-06 | End: 2019-11-11

## 2019-11-06 NOTE — TELEPHONE ENCOUNTER
Last Clinic Visit: 2/26/19, no upcoming visits.  Is over due for potassium, clinic contacted to schedule

## 2019-11-07 PROBLEM — M25.531 RIGHT WRIST PAIN: Status: RESOLVED | Noted: 2018-12-20 | Resolved: 2019-11-07

## 2019-11-07 PROBLEM — M65.4 RADIAL STYLOID TENOSYNOVITIS (DE QUERVAIN): Status: RESOLVED | Noted: 2018-12-14 | Resolved: 2019-11-07

## 2019-11-07 PROBLEM — M79.644 PAIN OF FINGER OF RIGHT HAND: Status: RESOLVED | Noted: 2018-12-05 | Resolved: 2019-11-07

## 2019-11-07 NOTE — PROGRESS NOTES
Discharge Note -Hand Therapy    Initial Evaluation Date:  12/4/19  Current Date: 11/7/2019  Number of Visits: 4    S:   Pt did not follow up for scheduled visits.  O:  Objective information is not available as pt has not returned for therapy.  Last visit or last objective information will serve as final entry.      A: Pt did not return for further treatment.    Status of goals is unknown due to lack of followup of patient.    P:  Discharge from Hand Therapy.  Discharge from OT this date due to lack of follow-up.

## 2019-11-08 DIAGNOSIS — E87.6 HYPOKALEMIA: ICD-10-CM

## 2019-11-08 LAB
ANION GAP SERPL CALCULATED.3IONS-SCNC: 5 MMOL/L (ref 3–14)
BUN SERPL-MCNC: 14 MG/DL (ref 7–30)
CALCIUM SERPL-MCNC: 8.6 MG/DL (ref 8.5–10.1)
CHLORIDE SERPL-SCNC: 103 MMOL/L (ref 94–109)
CO2 SERPL-SCNC: 28 MMOL/L (ref 20–32)
CREAT SERPL-MCNC: 0.98 MG/DL (ref 0.52–1.04)
GFR SERPL CREATININE-BSD FRML MDRD: 62 ML/MIN/{1.73_M2}
GLUCOSE SERPL-MCNC: 87 MG/DL (ref 70–99)
POTASSIUM SERPL-SCNC: 3.8 MMOL/L (ref 3.4–5.3)
SODIUM SERPL-SCNC: 136 MMOL/L (ref 133–144)

## 2019-11-11 ENCOUNTER — MYC REFILL (OUTPATIENT)
Dept: INTERNAL MEDICINE | Facility: CLINIC | Age: 59
End: 2019-11-11

## 2019-11-11 ENCOUNTER — TELEPHONE (OUTPATIENT)
Dept: INTERNAL MEDICINE | Facility: CLINIC | Age: 59
End: 2019-11-11

## 2019-11-11 DIAGNOSIS — I10 BENIGN ESSENTIAL HYPERTENSION: ICD-10-CM

## 2019-11-11 DIAGNOSIS — I63.9 CEREBROVASCULAR ACCIDENT (CVA), UNSPECIFIED MECHANISM (H): ICD-10-CM

## 2019-11-11 DIAGNOSIS — I63.9 ACUTE CVA (CEREBROVASCULAR ACCIDENT) (H): ICD-10-CM

## 2019-11-11 DIAGNOSIS — K21.9 GASTROESOPHAGEAL REFLUX DISEASE WITHOUT ESOPHAGITIS: ICD-10-CM

## 2019-11-11 RX ORDER — CLOPIDOGREL BISULFATE 75 MG/1
75 TABLET ORAL DAILY
Qty: 90 TABLET | Refills: 0 | Status: SHIPPED | OUTPATIENT
Start: 2019-11-11 | End: 2020-01-30

## 2019-11-11 RX ORDER — ATORVASTATIN CALCIUM 20 MG/1
20 TABLET, FILM COATED ORAL DAILY
Qty: 90 TABLET | Refills: 0 | Status: SHIPPED | OUTPATIENT
Start: 2019-11-11 | End: 2020-01-30

## 2019-11-11 RX ORDER — LISINOPRIL 20 MG/1
20 TABLET ORAL 2 TIMES DAILY
Qty: 180 TABLET | Refills: 0 | Status: SHIPPED | OUTPATIENT
Start: 2019-11-11 | End: 2020-01-30

## 2019-11-11 RX ORDER — CLOPIDOGREL BISULFATE 75 MG/1
TABLET ORAL
Qty: 90 TABLET | Refills: 3 | OUTPATIENT
Start: 2019-11-11

## 2019-11-11 RX ORDER — ATORVASTATIN CALCIUM 20 MG/1
20 TABLET, FILM COATED ORAL DAILY
Qty: 90 TABLET | Refills: 1 | OUTPATIENT
Start: 2019-11-11

## 2019-11-11 RX ORDER — PANTOPRAZOLE SODIUM 40 MG/1
40 TABLET, DELAYED RELEASE ORAL DAILY
Qty: 90 TABLET | Refills: 0 | Status: SHIPPED | OUTPATIENT
Start: 2019-11-11 | End: 2020-01-30

## 2019-11-11 RX ORDER — LISINOPRIL 20 MG/1
20 TABLET ORAL 2 TIMES DAILY
Qty: 180 TABLET | Refills: 2 | OUTPATIENT
Start: 2019-11-11

## 2019-11-11 NOTE — TELEPHONE ENCOUNTER
Fax from [employer] on [address] is requesting prior auth on pantoprazole (PROTONIX) 40 MG EC tablet.    Prior Authorization Retail Medication Request    Medication/Dose: Protonix      Insurance Name:    Coverage information:     Subscriber: [id number] SUAZN RODRIGUEZ     Rel to sub: 02 - Spouse     Member ID: [id number]     Payor: [facility] Ph: [phone]     Benefit plan: 14[facility] OUT OF STATE Ph: [phone]     Group number: [id number]     Member effective dates: from 01/01/17        Elyse Del Castillo RN on 11/11/2019 at 3:11 PM

## 2019-11-11 NOTE — TELEPHONE ENCOUNTER
Last Clinic Visit: 2/26/2019  [facility]    Scheduling has been notified to contact the pt for appointment in previous encounter.

## 2019-11-12 NOTE — TELEPHONE ENCOUNTER
Central Prior Authorization Team   Phone: [phone]      PA Initiation    Medication: pantoprazole (PROTONIX) 40 MG EC tablet  Insurance Company: [employer] - Phone [phone] Fax [phone]  Pharmacy Filling the Rx: [id number] - [place], [place] - [address]  Filling Pharmacy Phone: [phone]  Filling Pharmacy Fax:    Start Date: 11/12/2019

## 2019-11-14 ENCOUNTER — TELEPHONE (OUTPATIENT)
Dept: FAMILY MEDICINE | Facility: CLINIC | Age: 59
End: 2019-11-14

## 2019-11-14 NOTE — TELEPHONE ENCOUNTER
Protonix goes to [facility] in [place]. For future. Called pt to advise meds were sent to [facility] Maritza Lowery Paramedic on 11/14/2019 at 4:12 PM

## 2019-11-14 NOTE — TELEPHONE ENCOUNTER
Health Call Center    Phone Message    May a detailed message be left on voicemail: yes    Reason for Call: Medication Refill Request    Has the patient contacted the pharmacy for the refill? Yes   Name of medication being requested: clopidogrel (PLAVIX) 75 MG tablet, atorvastatin (LIPITOR) 20 MG tablet, potassium chloride ER (K-TAB/KLOR-CON) 10 MEQ CR tablet   Provider who prescribed the medication:   Pharmacy:  [id number] - [place], [place] - [address]    Date medication is needed: asap         NOT MED REFILL TEAM - new doctor  Action Taken: Message routed to:  [facility] ([facility] pcc

## 2019-12-05 ENCOUNTER — ANCILLARY PROCEDURE (OUTPATIENT)
Dept: CARDIOLOGY | Facility: CLINIC | Age: 59
End: 2019-12-05
Attending: NURSE PRACTITIONER
Payer: COMMERCIAL

## 2019-12-05 VITALS
DIASTOLIC BLOOD PRESSURE: 83 MMHG | SYSTOLIC BLOOD PRESSURE: 128 MMHG | OXYGEN SATURATION: 95 % | WEIGHT: 250.8 LBS | BODY MASS INDEX: 46.15 KG/M2 | HEIGHT: 62 IN | HEART RATE: 85 BPM

## 2019-12-05 DIAGNOSIS — I63.9 CRYPTOGENIC STROKE (H): ICD-10-CM

## 2019-12-05 DIAGNOSIS — Z95.818 STATUS POST PLACEMENT OF IMPLANTABLE LOOP RECORDER: Primary | ICD-10-CM

## 2019-12-05 DIAGNOSIS — I63.9 CRYPTOGENIC STROKE (H): Primary | ICD-10-CM

## 2019-12-05 PROCEDURE — 99213 OFFICE O/P EST LOW 20 MIN: CPT | Mod: ZP | Performed by: NURSE PRACTITIONER

## 2019-12-05 PROCEDURE — G0463 HOSPITAL OUTPT CLINIC VISIT: HCPCS | Mod: 25,ZF

## 2019-12-05 ASSESSMENT — MIFFLIN-ST. JEOR: SCORE: 1657.93

## 2019-12-05 ASSESSMENT — PAIN SCALES - GENERAL: PAINLEVEL: NO PAIN (0)

## 2019-12-05 NOTE — LETTER
"12/5/2019      RE: Natali Myers  [address] [facility] [id number]       Dear Colleague,    Thank you for the opportunity to participate in the care of your patient, Natali Myers, at the [facility]. Please see a copy of my visit note below.    Heart Care - Clinical Cardiac Electrophysiology       HPI: Natali Myers is a 59 year old female who presents for follow up s/p ILR for cryptogenic stroke. She has a past medical history significant for CVA (2016, 2018), HTN, asthma, and migraines. Neurology and cardiac workup revealed no probable cause for CVAs; there is no definite evidence of cardioembolism, large artery atherosclerosis, small artery disease, or other determined etiology, and no evidence of atrial fibrillation on a 12-lead ECG or on short-term cardiac monitoring. An ILR was placed on 8/1/208 for long term cardiac monitoring for atrial fibrillation to guide medical management.      Reviewed current interval:  -- Presenting Device check: \" Medtronic loop recorder per MD orders. Patient is scheduled to see Ema Edwards NP today. Normal loop recorder function.  No patient activated episodes recorded. Since last in clinic interrogation on 12/6/18, there has been 1 pause episode recorded which was undersensing of R waves and not a true pause.   Intrinsic rhythm = SR  @ 80 bpm. Loop recorder battery status = good. Patient reports that she is feeling well and denies specific complaints. Plan for patient to send next scheduled remote transmission every 3 months and return to clinic in 1 year.\"    Current Interval history:   Patient states that her PCP has left and the soonest she could get in with her new PCP is on 1/30/2020 so she is hoping I can help her with a medication question today as well. Patient states that she has had a new formulation of her K and it causes upset stomach. The refill before last (yellow pill) did " not cause problems. She willl check with pharm to figure out formulation and send us a MyChart so that we can provide her a Rx with no substitutions for this. States that her home BPs have been well controlled.  States that she will note some pedal edema on days when she has to stand a lot which is better in the mornings. States that she had stress at work last year and beginning of this year which is now improved.  Denies chest pain or pressure, syncope, dyspnea at rest or with exertion, palpitations, orthopnea, or PND. Denies new signs/symptoms of stroke such as visual disturbance, difficulty speaking, facial drooping, confusion, problems with gait, or any new numbness or weakness.    Current Outpatient Medications   Medication Sig Dispense Refill     albuterol (PROAIR HFA/PROVENTIL HFA/VENTOLIN HFA) 108 (90 BASE) MCG/ACT Inhaler Inhale 2 puffs into the lungs every 6 hours as needed for shortness of breath / dyspnea or wheezing 1 Inhaler 1     atorvastatin (LIPITOR) 20 MG tablet Take 1 tablet (20 mg) by mouth daily 90 tablet 0     Blood Pressure Monitoring KIT 1 Device daily Pt to check BP readings daily at home 1 kit 0     cetirizine (ZYRTEC) 10 MG CHEW Take 1 tablet (10 mg) by mouth as needed 30 tablet 3     clopidogrel (PLAVIX) 75 MG tablet Take 1 tablet (75 mg) by mouth daily 90 tablet 0     diclofenac (VOLTAREN) 1 % topical gel Apply 4 grams to knees or 2 grams to hands four times daily using enclosed dosing card. 100 g 1     DIPHENHYDRAMINE HCL PO Take 25 mg by mouth nightly as needed        fluticasone (FLONASE ALLERGY RELIEF) 50 MCG/ACT spray Spray 2 sprays into both nostrils daily 1 Bottle 3     lisinopril (PRINIVIL/ZESTRIL) 20 MG tablet Take 1 tablet (20 mg) by mouth 2 times daily 180 tablet 0     montelukast (SINGULAIR) 10 MG tablet Take 1 tablet (10 mg) by mouth At Bedtime 90 tablet 2     naproxen sodium (ALEVE) 220 MG capsule        pantoprazole (PROTONIX) 40 MG EC tablet Take 1 tablet (40 mg) by  mouth daily 90 tablet 0     potassium chloride ER (K-TAB/KLOR-CON) 10 MEQ CR tablet Take 2 tablets (20 mEq) by mouth daily Needs labs 180 tablet 0     triamcinolone (KENALOG) 0.1 % external cream Apply topically 2 times daily 45 g 3       Past Medical History:   Diagnosis Date     Arthritis     OA left hip, knees, wrists     Asthma with bronchitis      Chronic headaches      GERD (gastroesophageal reflux disease)     omprazole     History of stroke without residual deficits 1/31/2018    headaches     Hypertension      Migraines     On-going, 1 every month or 2     Mitral stenosis 2018    mild, no murmur, rheumatic     Neck pain     trapezius tightness     Seasonal allergies     mold and grass     Status post placement of implantable loop recorder 08/2018     Stroke (H) 07/18/2016    MRI scan that showed recent infarct in the central white matter of the right frontoparietal junction;         Past Surgical History:   Procedure Laterality Date     DILATION AND CURETTAGE  5/24/2014    Procedure: DILATION AND CURETTAGE;  Surgeon: Elyse Mcconnell MD;  [facility]     HYSTERECTOMY TOTAL ABDOMINAL  6/19/2014    Procedure: HYSTERECTOMY TOTAL ABDOMINAL;  Surgeon: Elyse Mcconnell MD;  [facility]: [facility] OR     KNEE SURGERY  age 23     cartilage/ incision both knees     RELEASE CARPAL TUNNEL BILATERAL  1996    [place]     SALPINGO-OOPHORECTOMY BILATERAL  6/19/2014    Procedure: SALPINGO-OOPHORECTOMY BILATERAL;  Surgeon: Elyse Mcconnell MD;  [facility] OR     WRIST SURGERY         Family History   Problem Relation Age of Onset     Diabetes Mother      Blood Disease Mother         ITP      Kidney Disease Mother      Cancer Mother      Cardiovascular Father      Alcohol/Drug Father      Cerebrovascular Disease Father 70        ,     Dementia Father      Diabetes Maternal Grandmother      Thyroid Disease Maternal Grandmother      Cancer Paternal Grandmother         eye      Dementia Paternal Grandmother      Asthma Brother       "Multiple Sclerosis Son 30       Social History     Tobacco Use     Smoking status: Never Smoker     Smokeless tobacco: Never Used   Substance Use Topics     Alcohol use: Yes     Comment: One a month        Allergies   Allergen Reactions     Mold      Seasonal Allergies      Hay fever     Codeine Rash     Latex Rash     Pt states this is NOT a latex allergy,allergy is neoprene rubber/in knee brace     No Clinical Screening - See Comments Rash     Neoprene rubber       Penicillins Rash     ROS:   A complete review of systems was performed and is negative except as noted in the HPI.     Physical Examination:  Vitals: /83 (BP Location: Left arm, Patient Position: Chair, Cuff Size: Adult Large)   Pulse 85   Ht 1.562 m (5' 1.5\")   Wt 113.8 kg (250 lb 12.8 oz)   LMP  (LMP Unknown)   SpO2 95%   BMI 46.62 kg/m     BMI= Body mass index is 46.62 kg/m .    GENERAL APPEARANCE: healthy, alert, and no acute distress  HEENT: no icterus, no xanthelasmas, normal pupil size and reaction, no cyanosis.  NECK: no asymmetry, no cervical bruits, no JVD   RESPIRATORY: lungs clear to auscultation - no rales, rhonchi or wheezes, no use of accessory muscles, no retractions, respirations are unlabored, normal respiratory rate  CARDIOVASCULAR: regular rhythm, normal S1 with physiologic split S2, no S3 or S4 and no murmur, click or rub  GI:  no abdominal bruits, soft, non-tender  EXTREMITIES: no clubbing  NEURO: alert and oriented to person/place/time, normal speech, gait and affect  VASC: Radial and posterior tibialis pulses +2 and symmetric bilaterally. No cyanosis or edema.   SKIN: no ecchymoses, no rashes    Assessment and recommendations:    # Cardiac device in situ: Cryptogenic stroke(2016, 2018) s/p ILR placed on 8/1/208 for long term cardiac monitoring for atrial fibrillation to guide medical management.   1. Normal device function. No evidence of AF or arrhythmias on device checks.   2. Keep scheduled follow ups with Device " Clinic    # Hypokalemia: Patient states that her PCP has left and the soonest she could get in with her new PCP is on 1/30/2020 so she is hoping I can help her with a medication question today as well. Patient states that she has had a new formulation of her K and it causes upset stomach. The refill before last (yellow pill) did not cause problems. She willl check with pharm to figure out formulation and send us a MyChart so that we can provide her a Rx with no substitutions for this.      Follow up: Follow up in EP Clinic in one year or follow up sooner as needed for symptoms or problems.    Patient expresses understanding and agreement with the plan.    I appreciate the chance to help with Natali Myers's care. Please let me know if you have any questions or concerns.    Ema EARL, CNP

## 2019-12-05 NOTE — PROGRESS NOTES
"    Heart Care - Clinical Cardiac Electrophysiology       HPI: Natali Myers is a 59 year old female who presents for follow up s/p ILR for cryptogenic stroke. She has a past medical history significant for CVA (2016, 2018), HTN, asthma, and migraines. Neurology and cardiac workup revealed no probable cause for CVAs; there is no definite evidence of cardioembolism, large artery atherosclerosis, small artery disease, or other determined etiology, and no evidence of atrial fibrillation on a 12-lead ECG or on short-term cardiac monitoring. An ILR was placed on 8/1/208 for long term cardiac monitoring for atrial fibrillation to guide medical management.      Reviewed current interval:  -- Presenting Device check: \" Medtronic loop recorder per MD orders. Patient is scheduled to see Ema Edwards NP today. Normal loop recorder function.  No patient activated episodes recorded. Since last in clinic interrogation on 12/6/18, there has been 1 pause episode recorded which was undersensing of R waves and not a true pause.   Intrinsic rhythm = SR  @ 80 bpm. Loop recorder battery status = good. Patient reports that she is feeling well and denies specific complaints. Plan for patient to send next scheduled remote transmission every 3 months and return to clinic in 1 year.\"    Current Interval history:   Patient states that her PCP has left and the soonest she could get in with her new PCP is on 1/30/2020 so she is hoping I can help her with a medication question today as well. Patient states that she has had a new formulation of her K and it causes upset stomach. The refill before last (yellow pill) did not cause problems. She willl check with pharm to figure out formulation and send us a MyChart so that we can provide her a Rx with no substitutions for this. States that her home BPs have been well controlled.  States that she will note some pedal edema on days when she has to stand a lot which is better in the mornings. " States that she had stress at work last year and beginning of this year which is now improved.  Denies chest pain or pressure, syncope, dyspnea at rest or with exertion, palpitations, orthopnea, or PND. Denies new signs/symptoms of stroke such as visual disturbance, difficulty speaking, facial drooping, confusion, problems with gait, or any new numbness or weakness.    Current Outpatient Medications   Medication Sig Dispense Refill     albuterol (PROAIR HFA/PROVENTIL HFA/VENTOLIN HFA) 108 (90 BASE) MCG/ACT Inhaler Inhale 2 puffs into the lungs every 6 hours as needed for shortness of breath / dyspnea or wheezing 1 Inhaler 1     atorvastatin (LIPITOR) 20 MG tablet Take 1 tablet (20 mg) by mouth daily 90 tablet 0     Blood Pressure Monitoring KIT 1 Device daily Pt to check BP readings daily at home 1 kit 0     cetirizine (ZYRTEC) 10 MG CHEW Take 1 tablet (10 mg) by mouth as needed 30 tablet 3     clopidogrel (PLAVIX) 75 MG tablet Take 1 tablet (75 mg) by mouth daily 90 tablet 0     diclofenac (VOLTAREN) 1 % topical gel Apply 4 grams to knees or 2 grams to hands four times daily using enclosed dosing card. 100 g 1     DIPHENHYDRAMINE HCL PO Take 25 mg by mouth nightly as needed        fluticasone (FLONASE ALLERGY RELIEF) 50 MCG/ACT spray Spray 2 sprays into both nostrils daily 1 Bottle 3     lisinopril (PRINIVIL/ZESTRIL) 20 MG tablet Take 1 tablet (20 mg) by mouth 2 times daily 180 tablet 0     montelukast (SINGULAIR) 10 MG tablet Take 1 tablet (10 mg) by mouth At Bedtime 90 tablet 2     naproxen sodium (ALEVE) 220 MG capsule        pantoprazole (PROTONIX) 40 MG EC tablet Take 1 tablet (40 mg) by mouth daily 90 tablet 0     potassium chloride ER (K-TAB/KLOR-CON) 10 MEQ CR tablet Take 2 tablets (20 mEq) by mouth daily Needs labs 180 tablet 0     triamcinolone (KENALOG) 0.1 % external cream Apply topically 2 times daily 45 g 3       Past Medical History:   Diagnosis Date     Arthritis     OA left hip, knees, wrists      Asthma with bronchitis      Chronic headaches      GERD (gastroesophageal reflux disease)     omprazole     History of stroke without residual deficits 1/31/2018    headaches     Hypertension      Migraines     On-going, 1 every month or 2     Mitral stenosis 2018    mild, no murmur, rheumatic     Neck pain     trapezius tightness     Seasonal allergies     mold and grass     Status post placement of implantable loop recorder 08/2018     Stroke (H) 07/18/2016    MRI scan that showed recent infarct in the central white matter of the right frontoparietal junction;         Past Surgical History:   Procedure Laterality Date     DILATION AND CURETTAGE  5/24/2014    Procedure: DILATION AND CURETTAGE;  Surgeon: Elyse Mcconnell MD;  [facility]     HYSTERECTOMY TOTAL ABDOMINAL  6/19/2014    Procedure: HYSTERECTOMY TOTAL ABDOMINAL;  Surgeon: Elyse Mcconnell MD;  [facility]     KNEE SURGERY  age 23     cartilage/ incision both knees     RELEASE CARPAL TUNNEL BILATERAL  1996    [place]     SALPINGO-OOPHORECTOMY BILATERAL  6/19/2014    Procedure: SALPINGO-OOPHORECTOMY BILATERAL;  Surgeon: Elyse Mcconnell MD;  [facility]     WRIST SURGERY         Family History   Problem Relation Age of Onset     Diabetes Mother      Blood Disease Mother         ITP      Kidney Disease Mother      Cancer Mother      Cardiovascular Father      Alcohol/Drug Father      Cerebrovascular Disease Father 70        ,     Dementia Father      Diabetes Maternal Grandmother      Thyroid Disease Maternal Grandmother      Cancer Paternal Grandmother         eye      Dementia Paternal Grandmother      Asthma Brother      Multiple Sclerosis Son 30       Social History     Tobacco Use     Smoking status: Never Smoker     Smokeless tobacco: Never Used   Substance Use Topics     Alcohol use: Yes     Comment: One a month        Allergies   Allergen Reactions     Mold      Seasonal Allergies      Hay fever     Codeine Rash     Latex Rash     Pt states  "this is NOT a latex allergy,allergy is neoprene rubber/in knee brace     No Clinical Screening - See Comments Rash     Neoprene rubber       Penicillins Rash     ROS:   A complete review of systems was performed and is negative except as noted in the HPI.     Physical Examination:  Vitals: /83 (BP Location: Left arm, Patient Position: Chair, Cuff Size: Adult Large)   Pulse 85   Ht 1.562 m (5' 1.5\")   Wt 113.8 kg (250 lb 12.8 oz)   LMP  (LMP Unknown)   SpO2 95%   BMI 46.62 kg/m    BMI= Body mass index is 46.62 kg/m .    GENERAL APPEARANCE: healthy, alert, and no acute distress  HEENT: no icterus, no xanthelasmas, normal pupil size and reaction, no cyanosis.  NECK: no asymmetry, no cervical bruits, no JVD   RESPIRATORY: lungs clear to auscultation - no rales, rhonchi or wheezes, no use of accessory muscles, no retractions, respirations are unlabored, normal respiratory rate  CARDIOVASCULAR: regular rhythm, normal S1 with physiologic split S2, no S3 or S4 and no murmur, click or rub  GI:  no abdominal bruits, soft, non-tender  EXTREMITIES: no clubbing  NEURO: alert and oriented to person/place/time, normal speech, gait and affect  VASC: Radial and posterior tibialis pulses +2 and symmetric bilaterally. No cyanosis or edema.   SKIN: no ecchymoses, no rashes    Assessment and recommendations:    # Cardiac device in situ: Cryptogenic stroke(2016, 2018) s/p ILR placed on 8/1/208 for long term cardiac monitoring for atrial fibrillation to guide medical management.   1. Normal device function. No evidence of AF or arrhythmias on device checks.   2. Keep scheduled follow ups with Device Clinic    # Hypokalemia: Patient states that her PCP has left and the soonest she could get in with her new PCP is on 1/30/2020 so she is hoping I can help her with a medication question today as well. Patient states that she has had a new formulation of her K and it causes upset stomach. The refill before last (yellow pill) did " not cause problems. She willl check with pharm to figure out formulation and send us a MyChart so that we can provide her a Rx with no substitutions for this.      Follow up: Follow up in EP Clinic in one year or follow up sooner as needed for symptoms or problems.    Patient expresses understanding and agreement with the plan.    I appreciate the chance to help with Natali Myers's care. Please let me know if you have any questions or concerns.    Ema EARL, CNP

## 2019-12-06 NOTE — NURSING NOTE
Chief Complaint   Patient presents with     Follow Up      1 year follow up w/ device check prior     Vitals were taken and medications reconciled.     Israel Camacho CMA  6:18 PM

## 2019-12-06 NOTE — PATIENT INSTRUCTIONS
Cardiology Provider you saw in clinic today: Ema EARL, NP-C    Medication Changes:  None today    Labs/Tests needed:  Device check was done today, looks good.      Follow-up Visit:  Follow up in one year or follow up sooner as needed for symptoms or problems    Work with your pharmacy to figure out what formula of potassium worked for you and send me a MyChart.     Further Instructions:      You will receive all normal lab and testing results via MyChart or mail if not reviewed in clinic today. Please contact our office if you need assistance with setting up MyChart.    If you need a medication refill please contact your pharmacy. Please allow 3 business days for your refill to be completed.     As always, thank you for trusting us with your health care needs!    If you have any questions regarding your visit please contact your care team:   Cardiology  Telephone Number    EP RN  Electrophysiology Nurse Coordinator [phone]     Call for EP procedure scheduling concerns  Verna Urbina,   MATTHEW  [phone]           Device Clinic (Pacemakers, ICDs, Loop)   RN's : Kristin Lewis Connie, Dawn During business hours: [phone]    After business hours:   [phone]- select option 4 and ask for job code [phone].

## 2019-12-06 NOTE — PATIENT INSTRUCTIONS
It was a pleasure to see you in clinic today.  Please do not hesitate to call with any questions or concerns.  You will be scheduled for automatic remote transmissions every 90 days with your next scheduled remote on 3/5/20.  We look forward to seeing you in clinic at your next device check in 1 year.      Kristin Flanagan, RN, BSN  Electrophysiology Nurse Clinician  [facility]    During Business Hours Please Call:  [phone]  After Hours Please Call:  [phone] - select option #4 and ask for job code [phone]

## 2019-12-10 ENCOUNTER — MYC MEDICAL ADVICE (OUTPATIENT)
Dept: CARDIOLOGY | Facility: CLINIC | Age: 59
End: 2019-12-10

## 2019-12-10 DIAGNOSIS — E87.6 HYPOKALEMIA: Primary | ICD-10-CM

## 2019-12-10 RX ORDER — POTASSIUM CHLORIDE 750 MG/1
10 TABLET, EXTENDED RELEASE ORAL 2 TIMES DAILY
Qty: 180 TABLET | Refills: 0 | Status: SHIPPED | OUTPATIENT
Start: 2019-12-10 | End: 2020-01-30

## 2020-01-29 ASSESSMENT — ENCOUNTER SYMPTOMS
COUGH: 0
SKIN CHANGES: 0
FLANK PAIN: 0
COUGH DISTURBING SLEEP: 0
SNORES LOUDLY: 0
EXERCISE INTOLERANCE: 1
ORTHOPNEA: 0
MUSCLE CRAMPS: 1
HEMOPTYSIS: 0
HEMATURIA: 0
POOR WOUND HEALING: 0
DYSPNEA ON EXERTION: 0
NECK PAIN: 0
PALPITATIONS: 0
MUSCLE WEAKNESS: 0
DIFFICULTY URINATING: 0
HYPOTENSION: 0
ARTHRALGIAS: 1
SYNCOPE: 0
HYPERTENSION: 0
WHEEZING: 1
POSTURAL DYSPNEA: 0
DYSURIA: 0
LEG PAIN: 1
SPUTUM PRODUCTION: 0
LIGHT-HEADEDNESS: 0
JOINT SWELLING: 1
MYALGIAS: 1
BACK PAIN: 1
SLEEP DISTURBANCES DUE TO BREATHING: 0
NAIL CHANGES: 0
SHORTNESS OF BREATH: 1
STIFFNESS: 1

## 2020-01-30 ENCOUNTER — OFFICE VISIT (OUTPATIENT)
Dept: FAMILY MEDICINE | Facility: CLINIC | Age: 60
End: 2020-01-30
Payer: COMMERCIAL

## 2020-01-30 ENCOUNTER — TELEPHONE (OUTPATIENT)
Dept: GASTROENTEROLOGY | Facility: CLINIC | Age: 60
End: 2020-01-30

## 2020-01-30 VITALS
WEIGHT: 250 LBS | BODY MASS INDEX: 46.47 KG/M2 | OXYGEN SATURATION: 96 % | DIASTOLIC BLOOD PRESSURE: 80 MMHG | SYSTOLIC BLOOD PRESSURE: 116 MMHG | HEART RATE: 100 BPM

## 2020-01-30 DIAGNOSIS — K21.9 GASTROESOPHAGEAL REFLUX DISEASE WITHOUT ESOPHAGITIS: ICD-10-CM

## 2020-01-30 DIAGNOSIS — E87.6 HYPOKALEMIA: ICD-10-CM

## 2020-01-30 DIAGNOSIS — K21.9 GASTROESOPHAGEAL REFLUX DISEASE, ESOPHAGITIS PRESENCE NOT SPECIFIED: ICD-10-CM

## 2020-01-30 DIAGNOSIS — J45.20 MILD INTERMITTENT ASTHMA WITHOUT COMPLICATION: ICD-10-CM

## 2020-01-30 DIAGNOSIS — L30.0 NUMMULAR ECZEMA: ICD-10-CM

## 2020-01-30 DIAGNOSIS — M77.9 TENDONITIS: ICD-10-CM

## 2020-01-30 DIAGNOSIS — Z95.818 STATUS POST PLACEMENT OF IMPLANTABLE LOOP RECORDER: ICD-10-CM

## 2020-01-30 DIAGNOSIS — I63.9 ACUTE ISCHEMIC STROKE (H): ICD-10-CM

## 2020-01-30 DIAGNOSIS — Z23 ENCOUNTER FOR IMMUNIZATION: ICD-10-CM

## 2020-01-30 DIAGNOSIS — Z12.31 VISIT FOR SCREENING MAMMOGRAM: ICD-10-CM

## 2020-01-30 DIAGNOSIS — I10 ESSENTIAL HYPERTENSION, BENIGN: ICD-10-CM

## 2020-01-30 DIAGNOSIS — Z00.00 ROUTINE HISTORY AND PHYSICAL EXAMINATION OF ADULT: ICD-10-CM

## 2020-01-30 DIAGNOSIS — I63.9 ACUTE CVA (CEREBROVASCULAR ACCIDENT) (H): ICD-10-CM

## 2020-01-30 DIAGNOSIS — Z76.89 ENCOUNTER TO ESTABLISH CARE: Primary | ICD-10-CM

## 2020-01-30 DIAGNOSIS — R05.9 COUGH: ICD-10-CM

## 2020-01-30 DIAGNOSIS — E78.5 HYPERLIPIDEMIA LDL GOAL <70: ICD-10-CM

## 2020-01-30 DIAGNOSIS — I10 BENIGN ESSENTIAL HYPERTENSION: ICD-10-CM

## 2020-01-30 RX ORDER — LISINOPRIL 20 MG/1
20 TABLET ORAL DAILY
Qty: 90 TABLET | Refills: 3 | Status: SHIPPED | OUTPATIENT
Start: 2020-01-30 | End: 2021-02-10

## 2020-01-30 RX ORDER — MONTELUKAST SODIUM 10 MG/1
10 TABLET ORAL AT BEDTIME
Qty: 90 TABLET | Refills: 3 | Status: SHIPPED | OUTPATIENT
Start: 2020-01-30 | End: 2021-04-28

## 2020-01-30 RX ORDER — ALBUTEROL SULFATE 90 UG/1
2 AEROSOL, METERED RESPIRATORY (INHALATION) EVERY 4 HOURS PRN
Qty: 1 INHALER | Refills: 1 | Status: SHIPPED | OUTPATIENT
Start: 2020-01-30

## 2020-01-30 RX ORDER — PANTOPRAZOLE SODIUM 40 MG/1
40 TABLET, DELAYED RELEASE ORAL DAILY
Qty: 90 TABLET | Refills: 3 | Status: SHIPPED | OUTPATIENT
Start: 2020-01-30 | End: 2021-03-21

## 2020-01-30 RX ORDER — POTASSIUM CHLORIDE 750 MG/1
10 TABLET, FILM COATED, EXTENDED RELEASE ORAL 2 TIMES DAILY
Qty: 180 TABLET | Refills: 3 | Status: SHIPPED | OUTPATIENT
Start: 2020-01-30 | End: 2020-12-20

## 2020-01-30 RX ORDER — TRIAMCINOLONE ACETONIDE 1 MG/G
CREAM TOPICAL 2 TIMES DAILY PRN
Qty: 80 G | Refills: 3 | Status: SHIPPED | OUTPATIENT
Start: 2020-01-30 | End: 2021-05-20

## 2020-01-30 RX ORDER — CLOPIDOGREL BISULFATE 75 MG/1
75 TABLET ORAL DAILY
Qty: 90 TABLET | Refills: 3 | Status: SHIPPED | OUTPATIENT
Start: 2020-01-30 | End: 2021-02-10

## 2020-01-30 RX ORDER — ATORVASTATIN CALCIUM 20 MG/1
20 TABLET, FILM COATED ORAL DAILY
Qty: 90 TABLET | Refills: 3 | Status: SHIPPED | OUTPATIENT
Start: 2020-01-30 | End: 2021-02-10

## 2020-01-30 ASSESSMENT — PAIN SCALES - GENERAL: PAINLEVEL: MODERATE PAIN (4)

## 2020-01-30 NOTE — PROGRESS NOTES
HPI:  Natlai Myers is a 60 year old female with history of stroke, hypertension, hypokalemia, anemia resolved after hysterectomy, intermittent asthma, seasonal allergies, GERD  who presents today to re-establish primary care, general preventive care, renew medications for chronic conditions  Hx of  Stroke 2016 and 2018  She feels stable at this time with minimal residual occasional memory loss for names otherwise has a job she loves at [employer]. She ahs a loop recorder follows with cardiology no recent arrhythmia. She follows with neurology and neurosurgery she is due for MRI of brain ( order extended today) as this was not yet scheduled. She needs refills of Plavix long term therapy.  Essential Hypertension  Well controlled on lisinapril 20 mg daily  Hypokalemia  SHe has to take potassium due to significant hypokalemia  Requires for tolerability Klor con 10 meq twice daily.  Asthma intermittent  She requests refill of albuterol. She only takes after viral illness or after trigger such as exhaust fumes or  Cooking smoke. She has never been a smoker. She finds singular also assists. She declines PFTS.  Esophageal reflux  She has been on PPI protonix ( tolerability) for reflux. She has tried to stop but has immediate reflux. She has not had endoscopy and is willing to complete after our discussion. She takes Nsaid such as aleve for joint pain. I discussed this increases risk of bleeding but she indicates she will continue to take as she has such severe joint and muscle pain   Left elbow tendonitis  She takes Nsaid such as aleve for joint pain. I discussed this increases risk of bleeding but she indicates she will continue to take as she has such severe joint and muscle pain. She has tried a tennis elbow brace.    .  Patient Active Problem List   Diagnosis     Esophageal reflux (GERD)     BMI 40.0-44.9, adult (H)     Anemia     S/P total hysterectomy and bilateral salpingo-oophorectomy     Acute ischemic  stroke (H)     Stroke (cerebrum) (H)     Essential hypertension, benign     Status post placement of implantable loop recorder     Hyperlipidemia LDL goal <70     Hypokalemia     Mild intermittent asthma without complication     Past Medical History:   Diagnosis Date     Arthritis     OA left hip, knees, wrists     Asthma with bronchitis      Chronic headaches      GERD (gastroesophageal reflux disease)     omprazole     History of stroke without residual deficits 1/31/2018    headaches     Hypertension      Migraines 1965    On-going, 1 every month or 2     Mitral stenosis 2018    mild, no murmur, rheumatic     Neck pain     trapezius tightness     Seasonal allergies     mold and grass     Status post placement of implantable loop recorder 08/2018     Stroke (H) 07/18/2016    MRI scan that showed recent infarct in the central white matter of the right frontoparietal junction;       Past Surgical History:   Procedure Laterality Date     DILATION AND CURETTAGE  5/24/2014    Procedure: DILATION AND CURETTAGE;  Surgeon: Elyse Mcconnell MD;  Location: [facility]     HYSTERECTOMY TOTAL ABDOMINAL  6/19/2014    Procedure: HYSTERECTOMY TOTAL ABDOMINAL;  Surgeon: Elyse Mcconnell MD;  Location: [facility]     KNEE SURGERY  age 23     cartilage/ incision both knees     RELEASE CARPAL TUNNEL BILATERAL  1996    [place]     SALPINGO-OOPHORECTOMY BILATERAL  6/19/2014    Procedure: SALPINGO-OOPHORECTOMY BILATERAL;  Surgeon: Elyse Mcconnell MD;  Location[facility]     WRIST SURGERY       Family History   Problem Relation Age of Onset     Diabetes Mother      Blood Disease Mother         ITP      Kidney Disease Mother      Cancer Mother      Cardiovascular Father      Alcohol/Drug Father      Cerebrovascular Disease Father 70        ,     Dementia Father      Diabetes Maternal Grandmother      Thyroid Disease Maternal Grandmother      Cancer Paternal Grandmother         eye      Dementia Paternal Grandmother      Chronic Obstructive Pulmonary  Disease Paternal Grandfather         farmer and carpentry     Asthma Brother      Multiple Sclerosis Son 30     Asthma Son      Social History     Social History V[place]a    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  She has an apartment in [place]. Lives in [place],  Robert  1978 works in [place]. She likes to spend time on [employer] home    Christleatha 1979 lives in [facility] 1980    5 grandchildren 4 granddaughters and one grandson    Loves to read, audiobooks, computer games, travel         ROS:    Answers for HPI/ROS submitted by the patient on 1/29/2020   General Symptoms: No  Skin Symptoms: Yes  HENT Symptoms: No  EYE SYMPTOMS: No  HEART SYMPTOMS: Yes  LUNG SYMPTOMS: Yes  INTESTINAL SYMPTOMS: No  URINARY SYMPTOMS: Yes  GYNECOLOGIC SYMPTOMS: No  BREAST SYMPTOMS: No  SKELETAL SYMPTOMS: Yes  BLOOD SYMPTOMS: No  NERVOUS SYSTEM SYMPTOMS: No  MENTAL HEALTH SYMPTOMS: No  Changes in hair: No  Changes in moles/birth marks: No  Itching: Yes  Rashes: No  Changes in nails: No  Acne: No  Hair in places you don't want it: No  Change in facial hair: No  Warts: Yes  Non-healing sores: No  Scarring: No  Flaking of skin: Yes  Color changes of hands/feet in cold : No  Sun sensitivity: No  Skin thickening: No  Cough: No  Sputum or phlegm: No  Coughing up blood: No  Difficulty breating or shortness of breath: Yes  Snoring: No  Wheezing: Yes  Difficulty breathing on exertion: No  Nighttime Cough: No  Difficulty breathing when lying flat: No  Chest pain or pressure: No  Fast or irregular heartbeat: No  Pain in legs with walking: Yes  Trouble breathing while lying down: No  Fingers or toes appear blue: No  High blood pressure: No  Low blood pressure: No  Fainting: No  Pacemaker: No  Varicose veins: Yes  Edema or swelling: Yes  Wake up at night with shortness of breath: No  Light-headedness: No  Exercise intolerance: Yes  Trouble holding urine or incontinence: Yes  Pain or burning: No  Trouble  starting or stopping: Yes  Increased frequency of urination: No  Blood in urine: No  Decreased frequency of urination: No  Frequent nighttime urination: No  Flank pain: No  Difficulty emptying bladder: No  Back pain: Yes  Muscle aches: Yes  Neck pain: No  Swollen joints: Yes  Joint pain: Yes  Bone pain: No  Muscle cramps: Yes  Muscle weakness: No  Joint stiffness: Yes  Bone fracture: No    CONSTITUTIONAL: no fatigue, no unexpected change in weight, no fever  SKIN: Eczema, no worrisome moles, no worrisome lesions  EYES: no acute vision problems or changes wears glasses  ENT: no ear problems, no mouth problems, no throat problems  RESP: no significant cough, no shortness of breath, no snoring  CV: no chest pain, no palpitations, no new or worsening peripheral edema  GI: GERD, no nausea, no vomiting, no constipation, no diarrhea  : no frequency, no dysuria, no hematuria, occasional incontinence  MS: Chronic joint pain, muscle cramps with low potassium  NEURO: no unilateral weakness, no dizziness, no syncope, occasional migraine headaches since childhood stable  ENDOCRINE: no temperature intolerance, no skin/hair changes  HEME: easy bruising due to plavix, no bleeding problems  PSYCHIATRIC: NEGATIVE for changes in mood or affect       Physical Exam:  Vitals: /80   Pulse 100   Wt 113.4 kg (250 lb)   LMP  (LMP Unknown)   SpO2 96%   BMI 46.47 kg/m    BMI= Body mass index is 46.47 kg/m .    GENERAL APPEARANCE: healthy, alert and no distress  EYES: Eyes grossly normal to inspection, PERRL and conjunctivae and sclerae normal  HENT: ear canals and TM's normal, nose and mouth without ulcers or lesions, oropharynx clear and oral mucous membranes moist  NECK: no adenopathy, no asymmetry, masses, or scars and thyroid normal to palpation  RESP: lungs clear to auscultation - no rales, rhonchi or wheezes  CV: regular rates and rhythm, normal S1 S2, no S3 or S4, no murmur, click or rub, no peripheral edema and peripheral  pulses strong  ABDOMEN: soft, nontender, no hepatosplenomegaly, no masses and bowel sounds normal  MS: no musculoskeletal defects are noted and gait is age appropriate without ataxia  SKIN: no suspicious lesions or rashes  NEURO: Normal strength and tone, sensory exam grossly normal, mentation intact and speech normal  PSYCH: mentation appears normal and affect normal/bright  Natali was seen today for medication refill.    Diagnoses and all orders for this visit:    Encounter to establish care  I reviewed my practice card provided.  Routine history and physical examination of adult    Encounter for immunization  -     FLU VAC PRESRV FREE QUAD SPLIT VIR 3+YRS IM  -     Pneumococcal vaccine 23 valent PPSV23  (Pneumovax) [48887]  -     ADMIN: Vaccine, Initial (14979)    Visit for screening mammogram  -     Mammogram, screening w michael (3D); Future    BMI 40.0-44.9, adult (H)      Acute ischemic stroke (H)  Comments:  history 2016 and 2018  Orders:  -     CBC with platelets differential; Future    Essential hypertension, benign  -     Comprehensive metabolic panel; Future  -     Lipid panel reflex to direct LDL Fasting; Future    Hyperlipidemia LDL goal <70  -     Lipid panel reflex to direct LDL Fasting; Future  -     TSH with free T4 reflex; Future    Hypokalemia  -     Cortisol; Future  -     KLOR-CON 10 MEQ CR tablet; Take 1 tablet (10 mEq) by mouth 2 times daily    Mild intermittent asthma without complication   Asthma action plan reviewed and provided  -     albuterol (PROAIR HFA/PROVENTIL HFA/VENTOLIN HFA) 108 (90 Base) MCG/ACT inhaler; Inhale 2 puffs into the lungs every 4 hours as needed for shortness of breath / dyspnea or wheezing    -     montelukast (SINGULAIR) 10 MG tablet; Take 1 tablet (10 mg) by mouth At Bedtime      Acute CVA (cerebrovascular accident) (H)  Comments:  1/16/18  Orders:  -     atorvastatin (LIPITOR) 20 MG tablet; Take 1 tablet (20 mg) by mouth daily  -     clopidogrel (PLAVIX) 75 MG  tablet; Take 1 tablet (75 mg) by mouth daily    Benign essential hypertension  -     lisinopril (PRINIVIL/ZESTRIL) 20 MG tablet; Take 1 tablet (20 mg) by mouth daily      Gastroesophageal reflux disease, esophagitis presence not specified  -     GASTROENTEROLOGY ADULT REF PROCEDURE ONLY [facility] [phone] Discussed need for EGD due to use of plavix, continued use of PPI intermittent use of NSAIDS. I encouraged her not to use NSAIDS due to bleeding risk  Gastroesophageal reflux disease without esophagitis  -     pantoprazole (PROTONIX) 40 MG EC tablet; Take 1 tablet (40 mg) by mouth daily    Nummular eczema  -     triamcinolone (KENALOG) 0.1 % external cream; Apply topically 2 times daily as needed for irritation (eczema)    Tendonitis  -     ORTHOPEDICS ADULT REFERRAL; Future    Status post placement of implantable loop recorder      She will f/u in 3-6  Months atfer completion of endoscopy  Options for treatment and follow-up care were reviewed with the patient. Natali Myers was engaged and actively involved in the decision making process and verbalized understanding of the options discussed and was satisfied with the final plan.    Rafael Florian MD

## 2020-01-30 NOTE — NURSING NOTE
Chief Complaint   Patient presents with     Medication Refill     Pt is here for medication refills.      Sol Hicks LPN at 7:56 AM on 1/30/2020.

## 2020-01-30 NOTE — NURSING NOTE
Patient received Flu and Pneumovax 23 vaccine.  See immunization list for administration details.  Patient tolerated injections well.     Sol Danielle LPN at 10:05 AM on 1/30/2020.

## 2020-01-30 NOTE — LETTER
My Asthma Action Plan    Name: Natali Myers   YOB: 1960  Date: 1/30/2020   My doctor: Rafael Florian MD   My clinic: [facility]        My Rescue Medicine:   Albuterol inhaler (Proair/Ventolin/Proventil HFA)  2-4 puffs EVERY 4 HOURS as needed. Use a spacer if recommended by your provider.   My Asthma Severity:   Intermittent / Exercise Induced  Know your asthma triggers: smoke, upper respiratory infections, strong odors and fumes and mold, exhuast             GREEN ZONE   Good Control    I feel good    No cough or wheeze    Can work, sleep and play without asthma symptoms       Take your asthma control medicine every day.     1. If exercise triggers your asthma, take your rescue medication    15 minutes before exercise or sports, and    During exercise if you have asthma symptoms  2. Spacer to use with inhaler: If you have a spacer, make sure to use it with your inhaler             YELLOW ZONE Getting Worse  I have ANY of these:    I do not feel good    Cough or wheeze    Chest feels tight    Wake up at night   1. Keep taking your Green Zone medications  2. Start taking your rescue medicine:    every 20 minutes for up to 1 hour. Then every 4 hours for 24-48 hours.  3. If you stay in the Yellow Zone for more than 12-24 hours, contact your doctor.  4. If you do not return to the Green Zone in 12-24 hours or you get worse, start taking your oral steroid medicine if prescribed by your provider.           RED ZONE Medical Alert - Get Help  I have ANY of these:    I feel awful    Medicine is not helping    Breathing getting harder    Trouble walking or talking    Nose opens wide to breathe       1. Take your rescue medicine NOW  2. If your provider has prescribed an oral steroid medicine, start taking it NOW  3. Call your doctor NOW  4. If you are still in the Red Zone after 20 minutes and you have not reached your doctor:    Take your rescue medicine again and    Call 911 or go  to the emergency room right away    See your regular doctor within 2 weeks of an Emergency Room or Urgent Care visit for follow-up treatment.          Annual Reminders:  Meet with Asthma Educator,  Flu Shot in the Fall, consider Pneumonia Vaccination for patients with asthma (aged 19 and older).    Pharmacy: [facility] [id number] [address] [place] [address] [address]    Electronically signed by Rafael Florian MD   Date: 01/30/20                    Asthma Triggers  How To Control Things That Make Your Asthma Worse    Triggers are things that make your asthma worse.  Look at the list below to help you find your triggers and   what you can do about them. You can help prevent asthma flare-ups by staying away from your triggers.      Trigger                                                          What you can do   Cigarette Smoke  Tobacco smoke can make asthma worse. Do not allow smoking in your home, car or around you.  Be sure no one smokes at a child s day care or school.  If you smoke, ask your health care provider for ways to help you quit.  Ask family members to quit too.  Ask your health care provider for a referral to Quit Plan to help you quit smoking, or call 9-584-471-PLAN.     Colds, Flu, Bronchitis  These are common triggers of asthma. Wash your hands often.  Don t touch your eyes, nose or mouth.  Get a flu shot every year.     Dust Mites  These are tiny bugs that live in cloth or carpet. They are too small to see. Wash sheets and blankets in hot water every week.   Encase pillows and mattress in dust mite proof covers.  Avoid having carpet if you can. If you have carpet, vacuum weekly.   Use a dust mask and HEPA vacuum.   Pollen and Outdoor Mold  Some people are allergic to trees, grass, or weed pollen, or molds. Try to keep your windows closed.  Limit time out doors when pollen count is high.   Ask you health care provider about taking medicine during allergy season.     Animal Dander  Some people are  allergic to skin flakes, urine or saliva from pets with fur or feathers. Keep pets with fur or feathers out of your home.    If you can t keep the pet outdoors, then keep the pet out of your bedroom.  Keep the bedroom door closed.  Keep pets off cloth furniture and away from stuffed toys.     Mice, Rats, and Cockroaches  Some people are allergic to the waste from these pests.   Cover food and garbage.  Clean up spills and food crumbs.  Store grease in the refrigerator.   Keep food out of the bedroom.   Indoor Mold  This can be a trigger if your home has high moisture. Fix leaking faucets, pipes, or other sources of water.   Clean moldy surfaces.  Dehumidify basement if it is damp and smelly.   Smoke, Strong Odors, and Sprays  These can reduce air quality. Stay away from strong odors and sprays, such as perfume, powder, hair spray, paints, smoke incense, paint, cleaning products, candles and new carpet.   Exercise or Sports  Some people with asthma have this trigger. Be active!  Ask your doctor about taking medicine before sports or exercise to prevent symptoms.    Warm up for 5-10 minutes before and after sports or exercise.     Other Triggers of Asthma  Cold air:  Cover your nose and mouth with a scarf.  Sometimes laughing or crying can be a trigger.  Some medicines and food can trigger asthma.

## 2020-01-30 NOTE — PATIENT INSTRUCTIONS
[facility] Medication Refill Request Information:  * Please contact your pharmacy regarding ANY request for medication refills.  ** [facility] Prescription Fax = [phone]  * Please allow 3 business days for routine medication refills.  * Please allow 5 business days for controlled substance medication refills.     [facility] Test Result notification information:  *You will be notified with in 7-10 days of your appointment day regarding the results of your test.  If you are on MyChart you will be notified as soon as the provider has reviewed the results and signed off on them.    Primary Care Center: [phone]     Upper GI Endoscopy (EGD) [phone]    Breast Center ([facility]) [phone] (2nd Floor [facility])   Breast Center ([facility]) [phone] ([address])     U Ortho [phone] (4th Floor [facility])      [facility] in [place], [place]  Located in: HEALTH  Address: [id number] [place] [address] [address], [place] [address]  Hours:   Open ? Closes 7PM    Phone: [phone]    Radiology:  [phone] [facility], [facility] and [place]  [phone] [facility]

## 2020-01-31 ASSESSMENT — ASTHMA QUESTIONNAIRES: ACT_TOTALSCORE: 21

## 2020-01-31 NOTE — TELEPHONE ENCOUNTER
DIAGNOSIS: Tendonitis r elbow. referred by [facility].   APPOINTMENT DATE: 3/6   NOTES STATUS DETAILS   OFFICE NOTE from referring provider Internal  Rafael Florian MD   OFFICE NOTE from other specialist N/A    DISCHARGE SUMMARY from hospital N/A    DISCHARGE REPORT from the ER N/A    OPERATIVE REPORT N/A    MEDICATION LIST Internal    MRI N/A    CT SCAN N/A    XRAYS (IMAGES & REPORTS) N/A

## 2020-02-07 DIAGNOSIS — E78.5 HYPERLIPIDEMIA LDL GOAL <70: ICD-10-CM

## 2020-02-07 DIAGNOSIS — I63.9 ACUTE ISCHEMIC STROKE (H): ICD-10-CM

## 2020-02-07 DIAGNOSIS — E87.6 HYPOKALEMIA: ICD-10-CM

## 2020-02-07 DIAGNOSIS — I10 ESSENTIAL HYPERTENSION, BENIGN: ICD-10-CM

## 2020-02-07 LAB
ALBUMIN SERPL-MCNC: 3.6 G/DL (ref 3.4–5)
ALP SERPL-CCNC: 109 U/L (ref 40–150)
ALT SERPL W P-5'-P-CCNC: 20 U/L (ref 0–50)
ANION GAP SERPL CALCULATED.3IONS-SCNC: 6 MMOL/L (ref 3–14)
AST SERPL W P-5'-P-CCNC: 15 U/L (ref 0–45)
BASOPHILS # BLD AUTO: 0.1 10E9/L (ref 0–0.2)
BASOPHILS NFR BLD AUTO: 0.9 %
BILIRUB SERPL-MCNC: 0.7 MG/DL (ref 0.2–1.3)
BUN SERPL-MCNC: 14 MG/DL (ref 7–30)
CALCIUM SERPL-MCNC: 9 MG/DL (ref 8.5–10.1)
CHLORIDE SERPL-SCNC: 107 MMOL/L (ref 94–109)
CHOLEST SERPL-MCNC: 115 MG/DL
CO2 SERPL-SCNC: 25 MMOL/L (ref 20–32)
CORTIS SERPL-MCNC: 12.9 UG/DL (ref 4–22)
CREAT SERPL-MCNC: 0.97 MG/DL (ref 0.52–1.04)
DIFFERENTIAL METHOD BLD: NORMAL
EOSINOPHIL # BLD AUTO: 0.1 10E9/L (ref 0–0.7)
EOSINOPHIL NFR BLD AUTO: 2.5 %
ERYTHROCYTE [DISTWIDTH] IN BLOOD BY AUTOMATED COUNT: 12.1 % (ref 10–15)
GFR SERPL CREATININE-BSD FRML MDRD: 63 ML/MIN/{1.73_M2}
GLUCOSE SERPL-MCNC: 105 MG/DL (ref 70–99)
HCT VFR BLD AUTO: 40.3 % (ref 35–47)
HDLC SERPL-MCNC: 47 MG/DL
HGB BLD-MCNC: 14 G/DL (ref 11.7–15.7)
IMM GRANULOCYTES # BLD: 0 10E9/L (ref 0–0.4)
IMM GRANULOCYTES NFR BLD: 0.4 %
LDLC SERPL CALC-MCNC: 28 MG/DL
LYMPHOCYTES # BLD AUTO: 1.4 10E9/L (ref 0.8–5.3)
LYMPHOCYTES NFR BLD AUTO: 24.2 %
MCH RBC QN AUTO: 32.4 PG (ref 26.5–33)
MCHC RBC AUTO-ENTMCNC: 34.7 G/DL (ref 31.5–36.5)
MCV RBC AUTO: 93 FL (ref 78–100)
MONOCYTES # BLD AUTO: 0.4 10E9/L (ref 0–1.3)
MONOCYTES NFR BLD AUTO: 6.4 %
NEUTROPHILS # BLD AUTO: 3.7 10E9/L (ref 1.6–8.3)
NEUTROPHILS NFR BLD AUTO: 65.6 %
NONHDLC SERPL-MCNC: 68 MG/DL
PLATELET # BLD AUTO: 186 10E9/L (ref 150–450)
POTASSIUM SERPL-SCNC: 4 MMOL/L (ref 3.4–5.3)
PROT SERPL-MCNC: 7.4 G/DL (ref 6.8–8.8)
RBC # BLD AUTO: 4.32 10E12/L (ref 3.8–5.2)
SODIUM SERPL-SCNC: 138 MMOL/L (ref 133–144)
TRIGL SERPL-MCNC: 198 MG/DL
TSH SERPL DL<=0.005 MIU/L-ACNC: 2.78 MU/L (ref 0.4–4)
WBC # BLD AUTO: 5.6 10E9/L (ref 4–11)

## 2020-02-07 PROCEDURE — 36415 COLL VENOUS BLD VENIPUNCTURE: CPT | Performed by: FAMILY MEDICINE

## 2020-02-07 PROCEDURE — 80061 LIPID PANEL: CPT | Performed by: FAMILY MEDICINE

## 2020-02-07 PROCEDURE — 82533 TOTAL CORTISOL: CPT | Performed by: FAMILY MEDICINE

## 2020-02-07 PROCEDURE — 80050 GENERAL HEALTH PANEL: CPT | Performed by: FAMILY MEDICINE

## 2020-03-05 ENCOUNTER — ANCILLARY PROCEDURE (OUTPATIENT)
Dept: CARDIOLOGY | Facility: CLINIC | Age: 60
End: 2020-03-05
Attending: NURSE PRACTITIONER
Payer: COMMERCIAL

## 2020-03-05 DIAGNOSIS — I63.9 CRYPTOGENIC STROKE (H): ICD-10-CM

## 2020-03-05 PROCEDURE — G2066 INTER DEVC REMOTE 30D: HCPCS | Mod: ZF

## 2020-03-05 PROCEDURE — 93298 REM INTERROG DEV EVAL SCRMS: CPT | Mod: ZP | Performed by: INTERNAL MEDICINE

## 2020-03-06 ENCOUNTER — PRE VISIT (OUTPATIENT)
Dept: ORTHOPEDICS | Facility: CLINIC | Age: 60
End: 2020-03-06

## 2020-03-06 ENCOUNTER — ANCILLARY PROCEDURE (OUTPATIENT)
Dept: GENERAL RADIOLOGY | Facility: CLINIC | Age: 60
End: 2020-03-06
Attending: STUDENT IN AN ORGANIZED HEALTH CARE EDUCATION/TRAINING PROGRAM
Payer: COMMERCIAL

## 2020-03-06 ENCOUNTER — OFFICE VISIT (OUTPATIENT)
Dept: ORTHOPEDICS | Facility: CLINIC | Age: 60
End: 2020-03-06
Payer: COMMERCIAL

## 2020-03-06 DIAGNOSIS — M77.02 MEDIAL EPICONDYLITIS OF LEFT ELBOW: ICD-10-CM

## 2020-03-06 NOTE — PROGRESS NOTES
Subjective:   Natali Myers is a 60 year old female who presents for left elbow tendonitis. Pt complains of left arm numbness during the day. No previous injury to elbow. Pt feels this could be due to her job. She works on a computer and typing causes pain.     Patient cannot remember a specific injury or how this elbow pain started although she admits it has been such a long time she does not remember.    She has had bilateral carpal tunnel release ~20 years ago. Her carpal tunnel issues developed around the same time so she does not recall where the elbow pain fit into this.    Patient notes that the majority of her pain is on the medial elbow although she does have some on the lateral side as well.    Her pain has waxed and waned over the past 20 years and depends on the amount of typing or lifting she does.    Lately, her pain worsened within the past 3 weeks. The pain is cumulative in that by the end of the workweek her pain is worse than at the beginning.    In terms of the numbness, she states this runs from the mid upper arm to her hand. Does not feel like she has any weakness.    She is R arm dominant.    Currently, she takes 220mg Naproxen BID every day. She has tried a counterforce brace but stopped this because it became less effective. She has had a steroid injection a long time (20 years) ago and thinks she probably did PT as well.    Background:   Date of injury: N/A   Duration of symptoms: 20 years  Mechanism of Injury: Chronic; Activity Related typing  Intensity: 5/10 at rest, 8/10 with activity   Aggravating factors: Typing  Relieving Factors: rest  Prior Evaluation: None    PAST MEDICAL, SOCIAL, SURGICAL AND FAMILY HISTORY: Past medical, surgical, family and social history was reviewed and unchanged since last visit.  ALLERGIES: She is allergic to mold; seasonal allergies; codeine; latex; no clinical screening - see comments; and penicillins.    CURRENT MEDICATIONS: She has a current  medication list which includes the following prescription(s): albuterol, atorvastatin, blood pressure monitoring, cetirizine, clopidogrel, diphenhydramine hcl, fluticasone, klor-con, lisinopril, montelukast, naproxen sodium, pantoprazole, and triamcinolone, and the following Facility-Administered Medications: lidocaine (pf) and methylprednisolone.     REVIEW OF SYSTEMS: 12 point review of systems is negative except as noted above.     Exam:   LMP  (LMP Unknown)       CONSTITUTIONAL: alert, no distress and over weight  HEAD: Normocephalic. No masses, lesions, tenderness or abnormalities  SKIN: no suspicious lesions or rashes  GAIT: normal  NEUROLOGIC: Non-focal, gross sensory exam equal bilateral arms  PSYCHIATRIC: affect normal/bright and mentation appears normal.    MUSCULOSKELETAL: L arm normal in appearance, mild TTP lateral epicondyle, significant TTP medial epicondyle, no pain with passive wrist extension or flexion, pain with resisted wrist flexion, no pain with resisted wrist extension, 5/5 strength hand , finger strength, arm strength, neg Spurlings, no pain/numbness/tingling with neck extension/flexion     Assessment/Plan:   1. Medial epicondylitis of left elbow  Discussed the natural course of the disease and our treatment options - she is already using NSAIDs, has used a counterforce brace in the past but stopped, no recent CSI or PT. She will complete 1 month of hand therapy and then RTC for reassessment. Could have some component of ulnar nerve involvement and we discussed possibly pursing EMG in the future.  - ORTHOPEDICS ADULT REFERRAL  - X-ray lt Elbow 2 vw  - HAND THERAPY Occupational Therapy or Physical Therapy; Future    X-RAY INTERPRETATION:   X-Ray of the Left Elbow: 2-view, ap, lateral: ordered and interpreted in the office today was negative for fracture, subluxation or joint space abnormality.       Patient seen, evaluated and discussed with the resident. I have verified the content of  the note, which accurately reflects my assessment of the patient and the plan of care.   Supervising Physician:  Socorro Torre MD

## 2020-03-06 NOTE — LETTER
3/6/2020      RE: Natali Myers  [id number] [facility] [id number]        Subjective:   Natali Myers is a 60 year old female who presents for left elbow tendonitis. Pt complains of left arm numbness during the day. No previous injury to elbow. Pt feels this could be due to her job. She works on a computer and typing causes pain.     Patient cannot remember a specific injury or how this elbow pain started although she admits it has been such a long time she does not remember.    She has had bilateral carpal tunnel release ~20 years ago. Her carpal tunnel issues developed around the same time so she does not recall where the elbow pain fit into this.    Patient notes that the majority of her pain is on the medial elbow although she does have some on the lateral side as well.    Her pain has waxed and waned over the past 20 years and depends on the amount of typing or lifting she does.    Lately, her pain worsened within the past 3 weeks. The pain is cumulative in that by the end of the workweek her pain is worse than at the beginning.    In terms of the numbness, she states this runs from the mid upper arm to her hand. Does not feel like she has any weakness.    She is R arm dominant.    Currently, she takes 220mg Naproxen BID every day. She has tried a counterforce brace but stopped this because it became less effective. She has had a steroid injection a long time (20 years) ago and thinks she probably did PT as well.    Background:   Date of injury: N/A   Duration of symptoms: 20 years  Mechanism of Injury: Chronic; Activity Related typing  Intensity: 5/10 at rest, 8/10 with activity   Aggravating factors: Typing  Relieving Factors: rest  Prior Evaluation: None    PAST MEDICAL, SOCIAL, SURGICAL AND FAMILY HISTORY: Past medical, surgical, family and social history was reviewed and unchanged since last visit.  ALLERGIES: She is allergic to mold; seasonal allergies; codeine; latex; no clinical  screening - see comments; and penicillins.    CURRENT MEDICATIONS: She has a current medication list which includes the following prescription(s): albuterol, atorvastatin, blood pressure monitoring, cetirizine, clopidogrel, diphenhydramine hcl, fluticasone, klor-con, lisinopril, montelukast, naproxen sodium, pantoprazole, and triamcinolone, and the following Facility-Administered Medications: lidocaine (pf) and methylprednisolone.     REVIEW OF SYSTEMS: 12 point review of systems is negative except as noted above.     Exam:   LMP  (LMP Unknown)       CONSTITUTIONAL: alert, no distress and over weight  HEAD: Normocephalic. No masses, lesions, tenderness or abnormalities  SKIN: no suspicious lesions or rashes  GAIT: normal  NEUROLOGIC: Non-focal, gross sensory exam equal bilateral arms  PSYCHIATRIC: affect normal/bright and mentation appears normal.    MUSCULOSKELETAL: L arm normal in appearance, mild TTP lateral epicondyle, significant TTP medial epicondyle, no pain with passive wrist extension or flexion, pain with resisted wrist flexion, no pain with resisted wrist extension, 5/5 strength hand , finger strength, arm strength, neg Spurlings, no pain/numbness/tingling with neck extension/flexion     Assessment/Plan:   1. Medial epicondylitis of left elbow  Discussed the natural course of the disease and our treatment options - she is already using NSAIDs, has used a counterforce brace in the past but stopped, no recent CSI or PT. She will complete 1 month of hand therapy and then RTC for reassessment. Could have some component of ulnar nerve involvement and we discussed possibly pursing EMG in the future.  - ORTHOPEDICS ADULT REFERRAL  - X-ray lt Elbow 2 vw  - HAND THERAPY Occupational Therapy or Physical Therapy; Future    X-RAY INTERPRETATION:   X-Ray of the Left Elbow: 2-view, ap, lateral: ordered and interpreted in the office today was negative for fracture, subluxation or joint space abnormality.        Patient seen, evaluated and discussed with the resident. I have verified the content of the note, which accurately reflects my assessment of the patient and the plan of care.   Supervising Physician:  MD Socorro Goddard MD

## 2020-06-05 ENCOUNTER — ANCILLARY PROCEDURE (OUTPATIENT)
Dept: CARDIOLOGY | Facility: CLINIC | Age: 60
End: 2020-06-05
Attending: INTERNAL MEDICINE
Payer: COMMERCIAL

## 2020-06-05 DIAGNOSIS — I63.9 CRYPTOGENIC STROKE (H): ICD-10-CM

## 2020-06-05 PROCEDURE — G2066 INTER DEVC REMOTE 30D: HCPCS | Mod: ZF

## 2020-06-05 PROCEDURE — 93298 REM INTERROG DEV EVAL SCRMS: CPT | Mod: ZP | Performed by: INTERNAL MEDICINE

## 2020-09-10 ENCOUNTER — ANCILLARY PROCEDURE (OUTPATIENT)
Dept: CARDIOLOGY | Facility: CLINIC | Age: 60
End: 2020-09-10
Attending: INTERNAL MEDICINE
Payer: COMMERCIAL

## 2020-09-10 DIAGNOSIS — I63.9 CRYPTOGENIC STROKE (H): ICD-10-CM

## 2020-09-10 PROCEDURE — G2066 INTER DEVC REMOTE 30D: HCPCS | Mod: ZF

## 2020-09-10 PROCEDURE — 93298 REM INTERROG DEV EVAL SCRMS: CPT | Mod: ZP | Performed by: INTERNAL MEDICINE

## 2020-11-14 ENCOUNTER — HEALTH MAINTENANCE LETTER (OUTPATIENT)
Age: 60
End: 2020-11-14

## 2020-12-16 DIAGNOSIS — E87.6 HYPOKALEMIA: ICD-10-CM

## 2020-12-20 RX ORDER — POTASSIUM CHLORIDE 750 MG/1
10 TABLET, EXTENDED RELEASE ORAL 2 TIMES DAILY
Qty: 180 TABLET | Refills: 0 | Status: SHIPPED | OUTPATIENT
Start: 2020-12-20 | End: 2021-03-30

## 2020-12-20 NOTE — TELEPHONE ENCOUNTER
"1/30/2020  [facility]   Rafael Florian MD  Family Medicine  N:; none    1/30/20   \" She will f/u in 3-6  Months atfer completion of endoscopy\"    Scheduling has been notified to contact the pt for appointment.     KLOR-CON 10 MEQ CR tablet    90 day SENT to pharmacy  "

## 2020-12-28 ENCOUNTER — ANCILLARY PROCEDURE (OUTPATIENT)
Dept: CARDIOLOGY | Facility: CLINIC | Age: 60
End: 2020-12-28
Attending: INTERNAL MEDICINE
Payer: COMMERCIAL

## 2020-12-28 DIAGNOSIS — I63.9 CRYPTOGENIC STROKE (H): ICD-10-CM

## 2020-12-28 PROCEDURE — G2066 INTER DEVC REMOTE 30D: HCPCS

## 2021-02-06 ENCOUNTER — HEALTH MAINTENANCE LETTER (OUTPATIENT)
Age: 61
End: 2021-02-06

## 2021-02-09 DIAGNOSIS — I63.9 ACUTE CVA (CEREBROVASCULAR ACCIDENT) (H): ICD-10-CM

## 2021-02-09 DIAGNOSIS — I10 BENIGN ESSENTIAL HYPERTENSION: ICD-10-CM

## 2021-02-10 RX ORDER — CLOPIDOGREL BISULFATE 75 MG/1
75 TABLET ORAL DAILY
Qty: 90 TABLET | Refills: 0 | Status: SHIPPED | OUTPATIENT
Start: 2021-02-10 | End: 2021-05-07

## 2021-02-10 RX ORDER — LISINOPRIL 20 MG/1
20 TABLET ORAL DAILY
Qty: 90 TABLET | Refills: 0 | Status: SHIPPED | OUTPATIENT
Start: 2021-02-10 | End: 2021-05-07

## 2021-02-10 RX ORDER — ATORVASTATIN CALCIUM 20 MG/1
20 TABLET, FILM COATED ORAL DAILY
Qty: 90 TABLET | Refills: 0 | Status: SHIPPED | OUTPATIENT
Start: 2021-02-10 | End: 2021-05-07

## 2021-02-10 NOTE — TELEPHONE ENCOUNTER
Last Clinic Visit: 1.30.20 recommended 3-6 month follow up, no upcoming appointments scheduled.  Overdue for Hgb, platelets, LDL, creatinine, potassium.  Blood pressure outdated. 90 day refills provided, routed to clinic for follow up

## 2021-02-17 ENCOUNTER — TELEPHONE (OUTPATIENT)
Dept: NEUROLOGY | Facility: CLINIC | Age: 61
End: 2021-02-17

## 2021-02-17 DIAGNOSIS — I63.9 CEREBROVASCULAR ACCIDENT (CVA), UNSPECIFIED MECHANISM (H): Primary | ICD-10-CM

## 2021-02-17 NOTE — TELEPHONE ENCOUNTER
"----- Message from Emily Chan RN sent at 2/17/2021 11:03 AM CST -----  Regarding: FW: New orders  Please call imaging or cardiology and see if implantable loop recorder (ILR) is MRI compatible.    Babar Prakash     ----- Message -----  From: José Miguel Snyder MD  Sent: 2/16/2021   9:40 PM CST  To: Emily Chan RN  Subject: RE: New orders                                   Hi - she has an ILR. Can you confirm if it is MRI compatible? Thanks    ----- Message -----  From: Emily Chan RN  Sent: 2/15/2021   4:24 PM CST  To: José Miguel Snyder MD  Subject: New orders                                       Hi,    Patient is overdue for imaging. She would like to do it at [facility] however the [facility] is the only facility that recognizes \"stroke complete\" as MRI, MRA head and neck. Please place new separate orders.     ThanksBabar           "

## 2021-02-17 NOTE — TELEPHONE ENCOUNTER
Spoke to Alan (radiologist) at [facility]. Per Alan ILR is MRI compatible. He did mention patient will need to download data before imaging since the magnet will erase data. After imaging is done ILR will continue to monitor patient. Patient can have it download at home or in clinic.

## 2021-03-16 DIAGNOSIS — K21.9 GASTROESOPHAGEAL REFLUX DISEASE WITHOUT ESOPHAGITIS: ICD-10-CM

## 2021-03-21 RX ORDER — PANTOPRAZOLE SODIUM 40 MG/1
40 TABLET, DELAYED RELEASE ORAL DAILY
Qty: 90 TABLET | Refills: 0 | Status: SHIPPED | OUTPATIENT
Start: 2021-03-21 | End: 2021-05-07

## 2021-03-21 NOTE — CONFIDENTIAL NOTE
pantoprazole (PROTONIX) 40 MG EC tablet     Last Written Prescription Date:  1/30/20  Last Fill Quantity: 90,   # refills: 3  Last Office Visit : 1/30/20  Future Office visit:  None    Scheduling has been notified to contact the pt for appointment.    90 day SENT to pharm

## 2021-03-22 NOTE — TELEPHONE ENCOUNTER
Patient schedule appointment with Dr Florian on Thursday 3/25 at 11:00 AM confirmed by patient.    Miracle Todd, Clinic Coordinator, March 22, 2021 at 11:04 AM

## 2021-03-25 ENCOUNTER — VIRTUAL VISIT (OUTPATIENT)
Dept: FAMILY MEDICINE | Facility: CLINIC | Age: 61
End: 2021-03-25
Payer: COMMERCIAL

## 2021-03-25 VITALS — DIASTOLIC BLOOD PRESSURE: 73 MMHG | HEART RATE: 81 BPM | SYSTOLIC BLOOD PRESSURE: 110 MMHG

## 2021-03-25 DIAGNOSIS — L50.9 HIVES: ICD-10-CM

## 2021-03-25 DIAGNOSIS — E78.5 HYPERLIPIDEMIA LDL GOAL <70: ICD-10-CM

## 2021-03-25 DIAGNOSIS — I63.9 CEREBROVASCULAR ACCIDENT (CVA), UNSPECIFIED MECHANISM (H): ICD-10-CM

## 2021-03-25 DIAGNOSIS — K21.9 GASTROESOPHAGEAL REFLUX DISEASE WITHOUT ESOPHAGITIS: ICD-10-CM

## 2021-03-25 DIAGNOSIS — Z12.31 VISIT FOR SCREENING MAMMOGRAM: ICD-10-CM

## 2021-03-25 DIAGNOSIS — Z86.79: ICD-10-CM

## 2021-03-25 DIAGNOSIS — Z86.73 HISTORY OF TIA (TRANSIENT ISCHEMIC ATTACK) AND STROKE: Primary | ICD-10-CM

## 2021-03-25 DIAGNOSIS — R73.03 PRE-DIABETES: ICD-10-CM

## 2021-03-25 DIAGNOSIS — Z95.818 STATUS POST PLACEMENT OF IMPLANTABLE LOOP RECORDER: ICD-10-CM

## 2021-03-25 PROCEDURE — 99215 OFFICE O/P EST HI 40 MIN: CPT | Mod: 95 | Performed by: FAMILY MEDICINE

## 2021-03-25 PROCEDURE — 99417 PROLNG OP E/M EACH 15 MIN: CPT | Performed by: FAMILY MEDICINE

## 2021-03-25 RX ORDER — CETIRIZINE HYDROCHLORIDE 10 MG/1
10 TABLET ORAL DAILY
COMMUNITY

## 2021-03-25 RX ORDER — LANCETS
EACH MISCELLANEOUS
Qty: 100 EACH | Refills: 4 | Status: SHIPPED | OUTPATIENT
Start: 2021-03-25

## 2021-03-25 RX ORDER — GLUCOSAMINE HCL/CHONDROITIN SU 500-400 MG
CAPSULE ORAL
Qty: 100 EACH | Refills: 3 | Status: SHIPPED | OUTPATIENT
Start: 2021-03-25

## 2021-03-25 SDOH — ECONOMIC STABILITY: TRANSPORTATION INSECURITY
IN THE PAST 12 MONTHS, HAS THE LACK OF TRANSPORTATION KEPT YOU FROM MEDICAL APPOINTMENTS OR FROM GETTING MEDICATIONS?: NO

## 2021-03-25 SDOH — ECONOMIC STABILITY: INCOME INSECURITY: HOW HARD IS IT FOR YOU TO PAY FOR THE VERY BASICS LIKE FOOD, HOUSING, MEDICAL CARE, AND HEATING?: NOT HARD AT ALL

## 2021-03-25 SDOH — SOCIAL STABILITY: SOCIAL NETWORK
DO YOU BELONG TO ANY CLUBS OR ORGANIZATIONS SUCH AS CHURCH GROUPS UNIONS, FRATERNAL OR ATHLETIC GROUPS, OR SCHOOL GROUPS?: NO

## 2021-03-25 SDOH — SOCIAL STABILITY: SOCIAL NETWORK: HOW OFTEN DO YOU ATTEND CHURCH OR RELIGIOUS SERVICES?: NEVER

## 2021-03-25 SDOH — SOCIAL STABILITY: SOCIAL NETWORK: IN A TYPICAL WEEK, HOW MANY TIMES DO YOU TALK ON THE PHONE WITH FAMILY, FRIENDS, OR NEIGHBORS?: TWICE A WEEK

## 2021-03-25 SDOH — HEALTH STABILITY: PHYSICAL HEALTH: ON AVERAGE, HOW MANY MINUTES DO YOU ENGAGE IN EXERCISE AT THIS LEVEL?: NOT ASKED

## 2021-03-25 SDOH — SOCIAL STABILITY: SOCIAL INSECURITY: WITHIN THE LAST YEAR, HAVE YOU BEEN HUMILIATED OR EMOTIONALLY ABUSED IN OTHER WAYS BY YOUR PARTNER OR EX-PARTNER?: NO

## 2021-03-25 SDOH — SOCIAL STABILITY: SOCIAL NETWORK: ARE YOU MARRIED, WIDOWED, DIVORCED, SEPARATED, NEVER MARRIED, OR LIVING WITH A PARTNER?: MARRIED

## 2021-03-25 SDOH — SOCIAL STABILITY: SOCIAL NETWORK: HOW OFTEN DO YOU ATTENT MEETINGS OF THE CLUB OR ORGANIZATION YOU BELONG TO?: NEVER

## 2021-03-25 SDOH — SOCIAL STABILITY: SOCIAL NETWORK: HOW OFTEN DO YOU GET TOGETHER WITH FRIENDS OR RELATIVES?: NEVER

## 2021-03-25 SDOH — ECONOMIC STABILITY: FOOD INSECURITY: WITHIN THE PAST 12 MONTHS, YOU WORRIED THAT YOUR FOOD WOULD RUN OUT BEFORE YOU GOT MONEY TO BUY MORE.: NEVER TRUE

## 2021-03-25 SDOH — SOCIAL STABILITY: SOCIAL INSECURITY
WITHIN THE LAST YEAR, HAVE TO BEEN RAPED OR FORCED TO HAVE ANY KIND OF SEXUAL ACTIVITY BY YOUR PARTNER OR EX-PARTNER?: NO

## 2021-03-25 SDOH — ECONOMIC STABILITY: FOOD INSECURITY: WITHIN THE PAST 12 MONTHS, THE FOOD YOU BOUGHT JUST DIDN'T LAST AND YOU DIDN'T HAVE MONEY TO GET MORE.: NEVER TRUE

## 2021-03-25 SDOH — HEALTH STABILITY: MENTAL HEALTH
STRESS IS WHEN SOMEONE FEELS TENSE, NERVOUS, ANXIOUS, OR CAN'T SLEEP AT NIGHT BECAUSE THEIR MIND IS TROUBLED. HOW STRESSED ARE YOU?: ONLY A LITTLE

## 2021-03-25 SDOH — SOCIAL STABILITY: SOCIAL INSECURITY
WITHIN THE LAST YEAR, HAVE YOU BEEN KICKED, HIT, SLAPPED, OR OTHERWISE PHYSICALLY HURT BY YOUR PARTNER OR EX-PARTNER?: NO

## 2021-03-25 SDOH — ECONOMIC STABILITY: TRANSPORTATION INSECURITY
IN THE PAST 12 MONTHS, HAS LACK OF TRANSPORTATION KEPT YOU FROM MEETINGS, WORK, OR FROM GETTING THINGS NEEDED FOR DAILY LIVING?: NO

## 2021-03-25 SDOH — SOCIAL STABILITY: SOCIAL INSECURITY: WITHIN THE LAST YEAR, HAVE YOU BEEN AFRAID OF YOUR PARTNER OR EX-PARTNER?: NO

## 2021-03-25 SDOH — HEALTH STABILITY: PHYSICAL HEALTH: ON AVERAGE, HOW MANY DAYS PER WEEK DO YOU ENGAGE IN MODERATE TO STRENUOUS EXERCISE (LIKE A BRISK WALK)?: 0 DAYS

## 2021-03-25 ASSESSMENT — PAIN SCALES - GENERAL: PAINLEVEL: MODERATE PAIN (5)

## 2021-03-25 NOTE — NURSING NOTE
Chief Complaint   Patient presents with     Recheck Medication     pt would like to follow up op, meds, headache since ER     Hospital F/U     pt would like to follow up from [facility] on 3/14       Riya Samaniego CMA, EMT at 10:37 AM on 3/25/2021.

## 2021-03-25 NOTE — Clinical Note
Please assist with mammogram. Needs cardiology referral ASAP and follow-up with me in one month.  Best wishes,  Rafael Florian MD

## 2021-03-25 NOTE — PATIENT INSTRUCTIONS
We are working hard to begin vaccinating more people against COVID-19. Currently, we are vaccinating:    Individuals age 65 and older    Phase 1a healthcare workers, both paid and unpaid, who are unable to do their job remotely.     People 16 and older with rare conditions or disabilities that put them at higher risk listed in Phase 1b tier 2.    People age 45-64 years with one or more underlying medical conditions listed in Phase 1b tier 3.    People age 16 or 18-44 years with two or more underlying medical conditions listed in Phase 1b tier 3.    People age 50 years and older in multigenerational housing (three or more generations living in the household)     If you fit into one of these categories, please log in to [employer] using this link to see if we have an open appointment and schedule an appointment.  Most new appointments are released on Tuesdays at 8 a.m. This is when appointment availability is best. Additional appointments may open up during the week as appointments are canceled or we receive additional vaccine.    If there are no appointments left, you will be unable to schedule.   If you have technical difficulty using [employer], call [phone] for assistance.      You can learn more about the [facility]'s phased approach to administering the vaccine, with details on each phase,?[email or web address].     As vaccine supply increases and we are able to open appointments to more groups, we will share that information on our website:  [email or web address]. Check this website to stay up to date on COVID-19 vaccination information.    Glucose supplies sent to your pharmacy  Work on lifestyle modification, lower carbohydrates and close follow-up checking blood sugars fasting and 2 hours postprandial up to twice per day with goal blood sugars being less than 140 fasting and less than 180 2 hours postprandial.       Patient Education      Prediabetes  You have been diagnosed with prediabetes. This means that the level of sugar (glucose) in your blood is too high. If you have prediabetes, you are at risk for developing type 2 diabetes. Type 2 diabetes is diagnosed when the level of glucose in the blood reaches a certain high level. With prediabetes, it hasn t reached this point yet. But it's higher than normal. It is vital to make lifestyle changes to lower your blood sugar, improve your health, and prevent diabetes.       Why worry about prediabetes?  Prediabetes is a condition where the body s cells have trouble using glucose in the blood for energy. As a result, too much glucose stays in the blood. This can affect how your heart and blood vessels work. Without changes in diet and lifestyle, the problem can get worse. Once you have type 2 diabetes, it's ongoing (chronic). It needs to be managed for the rest of your life. Diabetes can harm the body and your health by damaging organs, such as your eyes and kidneys. It makes you more likely to have heart disease. And it can damage nerves and blood vessels.   Who is a risk for prediabetes?  The exact cause of prediabetes is not clear. But certain risk factors make a person more likely to have it. These include:     A family history of type 2 diabetes    Being overweight    Being older than age 45    Have high blood pressure or high cholesterol     Having had gestational diabetes    Not being physically active    Being ,  American, , , , or   Diagnosing prediabetes  Prediabetes may have no symptoms. Or you may have some of the symptoms of diabetes (see below). The diagnosis is made with a blood test. You may have 1 or more of these blood tests:      Fasting glucose test. Blood is taken and tested after you have fasted (not eaten) for at least 8 hours. A normal test result is 99 milligrams per deciliter (mg/dL) or lower.  Prediabetes is 100 mg/dL to 125 mg/dL. Diabetes is 126 mg/dL or higher.    Glucose tolerance test. Your blood sugar is measured before and after you drink a very sugary liquid. A normal test result is 139 milligrams per deciliter (mg/dL) or lower. Prediabetes is 140 mg/dL to 199 mg/dL. Diabetes is 200 mg/dL or higher.    Hemoglobin A1c (HbA1c). Your HbA1c is normal if it is below 5.7%. Prediabetes is 5.7% to 6.4%. Diabetes is 6.5% or higher.   Treating prediabetes  The best way to treat prediabetes is to lose at least 5% to 7% of your current weight and be more physically active by getting at least 150 minutes a week of physical activity (at least 30 minutes daily.) When sitting for long periods of time, get up for short sessions of light activity every 30 minutes. These changes help the body s cells use blood sugar better. Even a small amount of weight loss can help. Work with your healthcare provider to make a plan to eat well and be more active. Keep in mind that small changes can add up. Other changes in your lifestyle (or even taking certain medicines, such as metformin) may make you less likely to develop diabetes. Your provider can talk with you about these. Stopping smoking will decrease your risk of developing diabetes. Don't use e-cigarettes or vaping products. But you may gain some weight if you are not careful.   Ask your healthcare provider for a referral to a lifestyle intervention program. This program will help you get to and stay at a 7% weight loss and increase physical activity.   Follow-up  If not treated, prediabetes can turn into diabetes. This is a serious health condition. Take steps to stop this from happening. Follow the treatment plan you have been given. You may have your blood glucose tested again in about 12 to 18 months.   Diabetes symptoms   Let your healthcare provider know if you have any of the following:    Always feel very tired    Feel very thirsty or hungry much of the  time    Have to urinate often    Lose weight for no reason    Feel numbness or tingling in your fingers or toes    Have cuts or bruises that don t seem to heal    Have blurry vision  Lesly last reviewed this educational content on 6/1/2019 2000-2020 [employer]. All rights reserved. This information is not intended as a substitute for professional medical care. Always follow your healthcare professional's instructions.

## 2021-03-25 NOTE — PROGRESS NOTES
Natali is a 61 year old who is being evaluated via a billable video visit.      How would you like to obtain your AVS? MyChart  If the video visit is dropped, the invitation should be resent by: Send to e-mail at: [email or web address] ([employer])  Will anyone else be joining your video visit? No      Assessment & Plan   Natali Myers is a 61 year old female with history of stroke, hypertension, hypokalemia, anemia resolved after hysterectomy, Mitral valve stenosis, loop recorder, intermittent asthma, seasonal allergies, dry eye, joint pains, pre-diabetes GERD  who presents today after ER visit March 14 [facility] with evaluation for stroke including CT acute stroke stroke protocol with and without IV contrast and MRI of brain echocardiogram showing mitral valve stenosis.  She had no new stroke findings and negative coagulopathy work-up.  Lab indicated elevated A1c at 6.2 in prediabetes range.  At this time we discussed possibility of starting Metformin however uncertain whether she needs to have additional contrast study therefore at this time will work on lifestyle modification and close follow-up with her checking blood sugars fasting and 2 hours postprandial up to twice per day with goal blood sugars being less than 140 fasting and less than 180 2 hours postprandial.  She indicated her daughter had side effects from Metformin therefore agreed to not start Metformin at this time.  She needs cardiology follow-up as she was not able to schedule during pandemic history of loop recorder as well as mitral valve abnormalities.  She has follow-up scheduled at [facility] with neurology.  Studies for MRI here at the [facility] were set up prior to her visit at [facility].  I asked her to check with neurology to determine if she requires additional MRI scans here at the [facility].  She did have hives a few days after the contrast therefore I recommended her to increase Zyrtec 10 mg to twice daily until  hives improve.  If she requires MR with contrast please notify ordering provider to consider premedication.  She is also due for several health maintenance items including screening mammogram which I ordered she would like to have next week if possible as she had to reschedule her COVID-19 vaccine and understands she should avoid mammogram shortly after COVID-19 vaccine.  As she maintains on Aleve due to her joint pains with the addition of aspirin and Plavix I reviewed she needs to watch for signs of GI discomfort also reiterated the increased risk of stroke and cardiovascular risk with nonsteroidal anti-inflammatory medications.  She is unable to go off of Aleve due to severe joint pains once requiring significant rehabilitation when she discontinued Aleve.  At this time we will hold off on upper GI or colonoscopy until further cardiac and neurological evaluation has stabilized.  I would recommend in person follow-up in 1 month to review blood sugars.      History of TIA (transient ischemic attack) and stroke   Cerebrovascular accident (CVA), unspecified mechanism (H)  She has appointment scheduled at [place] neurology after ER visit, Check with  Dr Snyder if she still needs MRI considering recent evaluation at [place].    Status post placement of implantable loop recorder Hx of mitral valve stenosis  Needs follow-up, She indicates her cardiologist retired  - CARDIOLOGY EVAL ADULT REFERRAL      Pre-diabetes  Lower carbohydrates  - blood glucose monitoring (NO BRAND SPECIFIED) meter device kit  Dispense: 1 kit; Refill: 0  - alcohol swab prep pads  Dispense: 100 each; Refill: 3  - blood glucose (NO BRAND SPECIFIED) test strip  Dispense: 100 strip; Refill: 4  - thin (NO BRAND SPECIFIED) lancets  Dispense: 100 each; Refill: 4    Visit for screening mammogram    - Mammogram, screening w michael (3D)    BMI 40.0-44.9, adult (H)  Needs lifestyle modification lower carbohydrates    Gastroesophageal reflux disease without  esophagitis  Continue PPI    Hyperlipidemia LDL goal <70  At Goal continue statin    Hives  Increase Zyrtec 10 mg twice daily until  Local hives resolve after contrast.    100 minutes spent on the date of the encounter doing chart review, history, exam, diagnostics review, documentation, counseling and coordination of cares as noted.                 Return in about 1 month (around 4/26/2021) for BP Recheck, follow-up.    Rafael Florian MD  [facility] [place]    Angy Hurst is a 61 year old who presents for the following health issues     HPI   Natali Myers is a 61 year old female with history of stroke, hypertension, hypokalemia, anemia resolved after hysterectomy, intermittent asthma, seasonal allergies, GERD  who presents today after re-establish primary care   ( Previous provider Dr Ivan) with me 1/2020 last visit due to pandemic. She did not follow-up on all recommendations after 1/2020 visit due to pandemic.  Natali is a 61-year-old female currently living in [place] for work but normally lives in the [facility] who presents today for video visit after presenting to [facility] on 3/14 for emergency room visit for possible TIA.  ER visit  Saturday 3/13 went to the grocery store she was not able to lift her right leg noted numbness and weakness.  She thought her symptoms were similar to symptoms of TIA in 2014 however she was not initially concerned as it seemed like things were improving therefore after grocery shopping she went home to relax.  She was able to go to sleep that night and then when she woke in the morning was concerned as her right leg seemed weak therefore she went to [facility] emergency room for further evaluation.  A thorough evaluation including CT of head and MRI was performed showing previous old cerebellar infarct and parietal infarct but no new concerns and no new significant narrowing of vessels.  She was told to add aspirin 81 mg  to her current Plavix and follow-up in a few days with neurology.  She also contacted neurology at the [place] and they ordered further scans including contrast MRI.  It is not clear if they were able to review her scans from [facility].  She notes she did after a few days have hives around the injection site where she received contrast and they are still present.  She reports her TIA symptoms are not 'consistent', head gets woozey difficulty concentration, no facial changes, No significant leg weakness.  She notes a buzzing in her left ear that has been present since she had a hysterectomy and low hemoglobin around the time of a hysterectomy.  The buzzing in her left ear becomes more apparent in particular when her pulse rate goes up.  She does have a loop recorder and has not followed up with cardiology recently therefore needs a cardiology follow-up as well.    I was able to review records noted she has Corine valve abnormalities HX in past no new thrombus. See echo below.  AT ER results reviewed, mildly elevated glucose 140 A1C 6.2, Normal TSH, Coagulopathy evaluation neg,lipids in good range.  Last visit with me was January 2020.  She has been working in [place] during the pandemic.  She has several other concerns she would like to address during today's visit including updating her chart.  It appears her med list has Zyrtec chewables but she is actually been taking Zyrtec 10 mg orally on a daily basis due to seasonal allergies.  She would like me to update her allergy list in that it was listed as latex allergy however the true allergy is neoprene that is in a knee brace where she developed a rash in 2014 and had testing found she was allergic to neoprene.  I reviewed neoprene was not an option for allergies in the electronic record therefore it was added onto the latex allergy however this is caused a problem for her and she would prefer it be updated another way which we accomplished today.  Esophageal  reflux  She has been on PPI protonix ( tolerability) for reflux. She has tried to stop but has immediate reflux. She has not had endoscopy was  willing to complete  after our discussion in 2020 she did not have due to pandemic. She takes Nsaid such as aleve for joint pain. I discussed this increases risk of bleeding but she indicates she will continue to take as she has such severe joint and muscle pain  She also would like to update that she is taking Aleve 1 tab twice daily to assist with joint pain.  She indicates without an anti-inflammatory medication she has severe joint pains and tendon pains and is unable to tolerate not being on a nonsteroidal anti-inflammatory medication although she has been told increased risk of bleeding and vascular risk of stroke/ MI linked to NSAIDs  She maintains on Plavix daily for prevention of stroke in the addition of aspirin 81 mg after ER visit.  Currently she denies any GI side effects of the above but also question whether she needed to have an upper GI reordered as she did not complete this after the visit in January 2020 due to various reasons pandemic mostly. Her last colonoscopy was noted as 2013, she has no current symptoms. I reviewed she has several other pressing concerns at this time will discuss further at follow-up after evaluation with neurology and cardiology as priorities.  She is also due for other health maintenance  Due for mammogram she would like to have on Monday if she has to come to the [facility].    She had to reschedule her Covid vaccine and understands she should have the mammogram done prior to Covid vaccine or complete mammogram several weeks after completion of the Covid vaccine.  Other updates are that she has very dry eyes because she is looking at the computer all the time she seen various eye providers without success after trial of many eyedrops.            Labs reviewed in EPIC  BP Readings from Last 3 Encounters:   01/30/20 116/80    12/05/19 128/83   02/26/19 120/79    Wt Readings from Last 3 Encounters:   01/30/20 113.4 kg (250 lb)   12/05/19 113.8 kg (250 lb 12.8 oz)   02/26/19 110.9 kg (244 lb 8 oz)                  Patient Active Problem List   Diagnosis     Esophageal reflux (GERD)     BMI 40.0-44.9, adult (H)     Anemia     S/P total hysterectomy and bilateral salpingo-oophorectomy     Acute ischemic stroke (H)     Stroke (cerebrum) (H)     Essential hypertension, benign     Status post placement of implantable loop recorder     Hyperlipidemia LDL goal <70     Hypokalemia     Mild intermittent asthma without complication     Past Surgical History:   Procedure Laterality Date     DILATION AND CURETTAGE  5/24/2014    Procedure: DILATION AND CURETTAGE;  Surgeon: Elyse Mcconnell MD;  Location: [facility] OR     HYSTERECTOMY TOTAL ABDOMINAL  6/19/2014    Procedure: HYSTERECTOMY TOTAL ABDOMINAL;  Surgeon: Elyse Mcconnell MD;  Location: [facility] OR     KNEE SURGERY  age 23     cartilage/ incision both knees     RELEASE CARPAL TUNNEL BILATERAL  1996    [place]     SALPINGO-OOPHORECTOMY BILATERAL  6/19/2014    Procedure: SALPINGO-OOPHORECTOMY BILATERAL;  Surgeon: Elyse Mcconnell MD;  Location: [facility] OR     WRIST SURGERY         Social History     Tobacco Use     Smoking status: Never Smoker     Smokeless tobacco: Never Used   Substance Use Topics     Alcohol use: Yes     Comment: One a month      Family History   Problem Relation Age of Onset     Diabetes Mother      Blood Disease Mother         ITP      Kidney Disease Mother      Cancer Mother      Cardiovascular Father      Alcohol/Drug Father      Cerebrovascular Disease Father 70        ,     Dementia Father      Diabetes Maternal Grandmother      Thyroid Disease Maternal Grandmother      Cancer Paternal Grandmother         eye      Dementia Paternal Grandmother      Chronic Obstructive Pulmonary Disease Paternal Grandfather         farmer and carpentry     Asthma Brother      Multiple Sclerosis Son 30      Asthma Son          Current Outpatient Medications   Medication Sig Dispense Refill     albuterol (PROAIR HFA/PROVENTIL HFA/VENTOLIN HFA) 108 (90 Base) MCG/ACT inhaler Inhale 2 puffs into the lungs every 4 hours as needed for shortness of breath / dyspnea or wheezing 1 Inhaler 1     aspirin (ASA) 81 MG EC tablet Take 1 tablet (81 mg) by mouth daily       atorvastatin (LIPITOR) 20 MG tablet Take 1 tablet (20 mg) by mouth daily For additional refills, please schedule an appointment at [phone], overdue for labs 90 tablet 0     Blood Pressure Monitoring KIT 1 Device daily Pt to check BP readings daily at home 1 kit 0     cetirizine (ZYRTEC) 10 MG CHEW Take 1 tablet (10 mg) by mouth as needed 30 tablet 3     clopidogrel (PLAVIX) 75 MG tablet Take 1 tablet (75 mg) by mouth daily For additional refills, please schedule an appointment at [phone], overdue for labs 90 tablet 0     DIPHENHYDRAMINE HCL PO Take 25 mg by mouth nightly as needed        fluticasone (FLONASE ALLERGY RELIEF) 50 MCG/ACT spray Spray 2 sprays into both nostrils daily 1 Bottle 3     lisinopril (ZESTRIL) 20 MG tablet Take 1 tablet (20 mg) by mouth daily For additional refills, please schedule an appointment at [phone], overdue for labs 90 tablet 0     montelukast (SINGULAIR) 10 MG tablet Take 1 tablet (10 mg) by mouth At Bedtime 90 tablet 3     naproxen sodium (ALEVE) 220 MG capsule        pantoprazole (PROTONIX) 40 MG EC tablet Take 1 tablet (40 mg) by mouth daily Call clinic to schedule follow up appointment. 90 tablet 0     potassium chloride ER (KLOR-CON) 10 MEQ CR tablet Take 1 tablet (10 mEq) by mouth 2 times daily Call clinic to schedule follow up appointment. 180 tablet 0     triamcinolone (KENALOG) 0.1 % external cream Apply topically 2 times daily as needed for irritation (eczema) 80 g 3     Allergies   Allergen Reactions     Mold      Seasonal Allergies      Hay fever     Codeine Rash     Latex Rash     Pt states this is NOT a  latex allergy,allergy is neoprene rubber/in knee brace     No Clinical Screening - See Comments Rash     Neoprene rubber       Penicillins Rash     Recent Labs   Lab Test 02/07/20  0737 11/08/19  1149 11/30/18  0748 02/01/18  0508 02/01/18  0508 01/31/18  2159 01/31/18  1637 01/31/18  1637 12/04/17  0727 12/04/17  0727 07/20/16  0831 07/20/16  0831   A1C  --   --   --   --   --  5.6  --   --   --  6.0  --  5.6   LDL 28  --  46  --  35  --   --   --   --  23   < > 96   HDL 47*  --  48*  --  47*  --   --   --   --  56   < > 44*   TRIG 198*  --  166*  --  91  --   --   --   --  142   < > 202*   ALT 20  --   --   --   --   --   --  26  --   --   --  25   CR 0.97 0.98 0.89   < > 0.72 0.70  --  0.76  --  0.87  --   --    GFRESTIMATED 63 62 65   < > 83 87   < > 78  --  67  --   --    GFRESTBLACK 74 72 79   < > >90 >90   < > >90  --  81  --   --    POTASSIUM 4.0 3.8 4.0   < > 3.8 3.0*   < > 2.9*   < > 2.9*   < >  --    TSH 2.78  --   --   --   --   --   --   --   --  2.89  --   --     < > = values in this interval not displayed.      Review of Systems         Objective       Vitals:    03/25/21 1033   BP: 110/73   Pulse: 81       Vitals:  No vitals were obtained today due to virtual visit.    Physical Exam   GENERAL: Healthy, alert  no distress, appears very stoic detail oriented and speech is clear forthcoming with information. Last BMI over 40  EYES: Eyes grossly normal to inspection.  No discharge or erythema, or obvious scleral/conjunctival abnormalities.  RESP: No audible wheeze, cough, or visible cyanosis.  No visible retractions or increased work of breathing.    SKIN: Visible skin clear. No significant rash, abnormal pigmentation or lesions.  NEURO: Cranial nerves grossly intact.  Mentation and speech appropriate for age. NO focal deficits observed but exam limited by video visit  PSYCH: Mentation appears intelligent, detail oriented, stoic,normal speech and appearance groomed.  PHQ-2 Score:     PHQ-2 ( 1999 Pfizer)  3/25/2021 1/30/2020   Q1: Little interest or pleasure in doing things 0 0   Q2: Feeling down, depressed or hopeless 1 0   PHQ-2 Score 1 0     ECHO 3/21/21 [place]  Final Impressions  1. Thickened mitral valve with mildly restricted leaflet mobility.  2. Mild mitral valve stenosis.  Mean diastolic gradient 6 mmHg (heart rate 89 BPM). Mild  regurgitation.  3. Normal left atrial appendage.  Excellent emptying velocities. No thrombus.  4. No right-to-left shunt at atrial level at rest or with Valsalva release.  5. Trivial immobile atherosclerosis of the descending thoracic aorta.  6. Estimated left ventricular ejection fraction 65 %.  3/14/2021   Ref Range & Units 11d ago   Cholesterol, Total, S  mg/dL 115    Comment:    ----REFERENCE VALUE----   Desirable: < 200   Borderline high: 200 - 239   High: > or = 240   Triglycerides, S  mg/dL 159High     Comment:    ----REFERENCE VALUE----   Normal: <150   Borderline high: 150-199   High: 200-499   Very high: > or =500   Cholesterol, HDL, S >=50 mg/dL 44Low     Calculated LDL  mg/dL 39    Comment:    ----REFERENCE VALUE----   Desirable: <100   Above Desirable: 100-129   Borderline high: 130-159   High: 160-189   Very high: > or =190   Non HDL Cholesterol  mg/dL 71    Comment:    ----REFERENCE VALUE----   Desirable: <130   Above Desirable: 130-159   Borderline high: 160-189   High: 190-219   Very high: > or =220     3/14/2021  No acute infarct.   Result Narrative   EXAM: MR BRAIN WITHOUT IV CONTRAST    COMPARISON: CT head and CT angiography of the head and neck 03/14/2021.    FINDINGS: Multiple small, old infarcts in the right cerebellum. Additional  small, old infarct in the right parietal lobe. No acute infarct. No abnormal  diffusion restriction or evidence of hemorrhage. Mild leukoaraiosis.    The ventricles are normal in size and configuration. No extra-axial fluid  collection or evidence of hydrocephalus.    Grossly normal appearance of the orbits and pituitary gland.  Minimal paranasal  sinus mucosal thickening.   Other Result Information   Interface, [facility] Generic [id number] - 03/14/2021 11:30 AM CDT  Formatting of this note might be different from the original.  EXAM: MR BRAIN WITHOUT IV CONTRAST    COMPARISON: CT head and CT angiography of the head and neck 03/14/2021.    FINDINGS: Multiple small, old infarcts in the right cerebellum. Additional  small, old infarct in the right parietal lobe. No acute infarct. No abnormal  diffusion restriction or evidence of hemorrhage. Mild leukoaraiosis.    The ventricles are normal in size and configuration. No extra-axial fluid  collection or evidence of hydrocephalus.    Grossly normal appearance of the orbits and pituitary gland. Minimal paranasal  sinus mucosal thickening.    IMPRESSION:  No acute infarct.       CT chest 3/14/2021  1. No thromboembolic source identified in the chest.  2. Diffuse thyroid enlargement.   Result Narrative   EXAM:  CT CHEST ANGIOGRAM WITH IV CONTRAST  Including 3D image post-processing.    COMPARISON:  None.    CARDIOVASCULAR FINDINGS:  Variant common origin left common carotid and right brachiocephalic artery.  Patent proximal great vessels. The thoracic aorta is normal caliber, without  significant atherosclerotic plaque.    Mild prominence of the left atrium. No intracardiac mass or filling defect  including within the left atrial appendage and at the left ventricular apex.  Grossly normal left ventricular wall motion. Nothing for left ventricular  aneurysm or pseudoaneurysm.     ADDITIONAL FINDINGS:  The thyroid is homogeneously enhancing and diffusely enlarged without focal  nodularity or mass. No pericardial effusion. The visualized lung fields are well  aerated. No suspicious osseous finding.   Other Result Information   Interface, [facility] Generic [id number] - 03/14/2021 11:00 AM CDT  Formatting of this note might be different from the original.  EXAM:  CT CHEST ANGIOGRAM  WITH IV CONTRAST  Including 3D image post-processing.    COMPARISON:  None.    CARDIOVASCULAR FINDINGS:  Variant common origin left common carotid and right brachiocephalic artery.  Patent proximal great vessels. The thoracic aorta is normal caliber, without  significant atherosclerotic plaque.    Mild prominence of the left atrium. No intracardiac mass or filling defect  including within the left atrial appendage and at the left ventricular apex.  Grossly normal left ventricular wall motion. Nothing for left ventricular  aneurysm or pseudoaneurysm.     ADDITIONAL FINDINGS:  The thyroid is homogeneously enhancing and diffusely enlarged without focal  nodularity or mass. No pericardial effusion. The visualized lung fields are well  aerated. No suspicious osseous finding.    IMPRESSION:  1. No thromboembolic source identified in the chest.  2. Diffuse thyroid enlargement.       CT head stroke protocal  1. No acute finding on noncontrast head CT. Small probable old infarcts in the  right cerebellar hemisphere.  2. No arterial occlusion, high-grade stenosis or acute vascular injury in the  head or neck.  3. Diffuse thyroid enlargement.   Result Narrative   EXAM: CT HEAD ACUTE STROKE PROTOCOL WITHOUT IV CONTRAST, CT HEAD NECK ANGIOGRAM  WITH IV CONTRAST  Including 3D image post-processing.    COMPARISON: None.    FINDINGS:   NONCONTRAST HEAD CT: No acute intracranial hemorrhage, mass effect or  extra-axial fluid collection. Maintained gray-white matter differentiation  without evidence of ischemic territorial infarct. A few small zones of abnormal  low attenuation in the right cerebellar hemisphere likely represent small, old  infarcts. Normal caliber ventricles. Patent basal cisterns. Mild mucosal  thickening inferior right maxillary sinus. The paranasal sinuses and mastoid air  cells are otherwise well aerated. Intact calvarium.    CTA HEAD: The bilateral MCAs, ACAs and PCAs are patent and normal caliber.  Patent  anterior communicating artery. Diminutive bilateral posterior  communicating arteries. Patent codominant vertebrobasilar system. No  intracranial aneurysm. The major dural venous sinuses appear grossly patent.    CTA NECK: The bilateral vertebral, common carotid and cervical internal carotid  arteries are patent throughout the neck without narrowing or acute vascular  injury. The right internal carotid artery has a retropharyngeal course over a  short distance. Variant common origin of the left common carotid and right  brachiocephalic artery from the aortic arch.    Well aerated pulmonary apices. The thyroid is homogeneously enhancing and  diffusely enlarged.   Other Result Information   Interface,  [facility] Generic [id number] - 03/14/2021  9:58 AM CDT  Formatting of this note might be different from the original.  EXAM: CT HEAD ACUTE STROKE PROTOCOL WITHOUT IV CONTRAST, CT HEAD NECK ANGIOGRAM  WITH IV CONTRAST  Including 3D image post-processing.    COMPARISON: None.    FINDINGS:   NONCONTRAST HEAD CT: No acute intracranial hemorrhage, mass effect or  extra-axial fluid collection. Maintained gray-white matter differentiation  without evidence of ischemic territorial infarct. A few small zones of abnormal  low attenuation in the right cerebellar hemisphere likely represent small, old  infarcts. Normal caliber ventricles. Patent basal cisterns. Mild mucosal  thickening inferior right maxillary sinus. The paranasal sinuses and mastoid air  cells are otherwise well aerated. Intact calvarium.    CTA HEAD: The bilateral MCAs, ACAs and PCAs are patent and normal caliber.  Patent anterior communicating artery. Diminutive bilateral posterior  communicating arteries. Patent codominant vertebrobasilar system. No  intracranial aneurysm. The major dural venous sinuses appear grossly patent.    CTA NECK: The bilateral vertebral, common carotid and cervical internal carotid  arteries are patent throughout the neck  without narrowing or acute vascular  injury. The right internal carotid artery has a retropharyngeal course over a  short distance. Variant common origin of the left common carotid and right  brachiocephalic artery from the aortic arch.    Well aerated pulmonary apices. The thyroid is homogeneously enhancing and  diffusely enlarged.    IMPRESSION:  1. No acute finding on noncontrast head CT. Small probable old infarcts in the  right cerebellar hemisphere.  2. No arterial occlusion, high-grade stenosis or acute vascular injury in the  head or neck.  3. Diffuse thyroid enlargement.                 Video-Visit Details    Type of service:  Video Visit    Video Time:11:05- 11:45    Originating Location (pt. Location): Home    Distant Location (provider location):  [facility] PRIMARY [facility]     Platform used for Video Visit: [employer]

## 2021-03-25 NOTE — Clinical Note
Please review recent [facility] ER visit and MRI, CT results. She is wondering if she needs further studies ordered by you prior to this ER visit.  Rafael Florian MD

## 2021-03-26 ENCOUNTER — TELEPHONE (OUTPATIENT)
Dept: FAMILY MEDICINE | Facility: CLINIC | Age: 61
End: 2021-03-26

## 2021-03-26 DIAGNOSIS — E87.6 HYPOKALEMIA: ICD-10-CM

## 2021-03-26 NOTE — TELEPHONE ENCOUNTER
Call patient to help assist with mammogram, cardiology referral and follow-up with Dr Florian in one month per provider virtual visit on 3/25/21. Patient already schedule mammogram. Left voice message to call [facility] for scheduling a one month follow up and Cardiology phone number.     Miracle Todd, Clinic Coordinator, March 26, 2021 at 11:35 AM

## 2021-03-28 ENCOUNTER — ANCILLARY PROCEDURE (OUTPATIENT)
Dept: CARDIOLOGY | Facility: CLINIC | Age: 61
End: 2021-03-28
Attending: INTERNAL MEDICINE
Payer: COMMERCIAL

## 2021-03-28 ENCOUNTER — HEALTH MAINTENANCE LETTER (OUTPATIENT)
Age: 61
End: 2021-03-28

## 2021-03-28 DIAGNOSIS — I63.9 CRYPTOGENIC STROKE (H): ICD-10-CM

## 2021-03-28 PROCEDURE — G2066 INTER DEVC REMOTE 30D: HCPCS

## 2021-03-29 ENCOUNTER — ANCILLARY PROCEDURE (OUTPATIENT)
Dept: MAMMOGRAPHY | Facility: CLINIC | Age: 61
End: 2021-03-29
Attending: FAMILY MEDICINE
Payer: COMMERCIAL

## 2021-03-29 ENCOUNTER — TELEPHONE (OUTPATIENT)
Dept: CARDIOLOGY | Facility: CLINIC | Age: 61
End: 2021-03-29

## 2021-03-29 DIAGNOSIS — Z12.31 VISIT FOR SCREENING MAMMOGRAM: ICD-10-CM

## 2021-03-29 PROCEDURE — 77067 SCR MAMMO BI INCL CAD: CPT | Mod: GC | Performed by: STUDENT IN AN ORGANIZED HEALTH CARE EDUCATION/TRAINING PROGRAM

## 2021-03-29 NOTE — TELEPHONE ENCOUNTER
"Pt needs to schedule an appointment with a general cardiologist per a referral.    Cardiology eval adult referral has been cancelled; can be found in \"finalized requests\" tab of the patient's appointment desk. Please reinstate referral request and link to appointment when scheduling.   "

## 2021-03-30 RX ORDER — POTASSIUM CHLORIDE 750 MG/1
10 TABLET, EXTENDED RELEASE ORAL 2 TIMES DAILY
Qty: 180 TABLET | Refills: 1 | Status: SHIPPED | OUTPATIENT
Start: 2021-03-30 | End: 2021-05-20

## 2021-03-30 NOTE — TELEPHONE ENCOUNTER
POTASSIUM CL ER 10 MEQ TABLET   Last Written Prescription Date:  12/20/2020  Last Fill Quantity: 180,   # refills: 0  Last Office Visit : 3/25/2021  Future Office visit:  None  180 Tabs, 1 Refill sent to pharm  3/30/2021      Mel Rooney RN  Central Triage Red Flags/Med Refills

## 2021-04-13 ENCOUNTER — VIRTUAL VISIT (OUTPATIENT)
Dept: NEUROLOGY | Facility: CLINIC | Age: 61
End: 2021-04-13
Payer: COMMERCIAL

## 2021-04-13 DIAGNOSIS — G43.909 MIGRAINE WITHOUT STATUS MIGRAINOSUS, NOT INTRACTABLE, UNSPECIFIED MIGRAINE TYPE: ICD-10-CM

## 2021-04-13 DIAGNOSIS — G89.29 CHRONIC NONINTRACTABLE HEADACHE, UNSPECIFIED HEADACHE TYPE: ICD-10-CM

## 2021-04-13 DIAGNOSIS — I63.9 CEREBROVASCULAR ACCIDENT (CVA), UNSPECIFIED MECHANISM (H): Primary | ICD-10-CM

## 2021-04-13 DIAGNOSIS — R51.9 CHRONIC NONINTRACTABLE HEADACHE, UNSPECIFIED HEADACHE TYPE: ICD-10-CM

## 2021-04-13 PROCEDURE — 99215 OFFICE O/P EST HI 40 MIN: CPT | Mod: 95 | Performed by: PSYCHIATRY & NEUROLOGY

## 2021-04-13 PROCEDURE — 99417 PROLNG OP E/M EACH 15 MIN: CPT | Performed by: PSYCHIATRY & NEUROLOGY

## 2021-04-13 PROCEDURE — 93298 REM INTERROG DEV EVAL SCRMS: CPT | Performed by: INTERNAL MEDICINE

## 2021-04-13 NOTE — PROGRESS NOTES
[facility] NEUROLOGY CLINIC [address]  3RD FLOOR  [facility] [address] [phone]          April 14, 2021     Natali Myers                                                                                                                     [address]  [facility] [address]  Video visit: 11:45 AM - 12:27 PM   Chart review, documentation: 30   Total 72 minutes      Ms. Myers is a very pleasant 61 year old lady who has had at least 2 symptomatic strokes. To summarize her stroke history, the first stroke was in 2016 where she presented with a left leg numbness and weakness and a brain MRI showed a small infarct close to or touching the central sulcus and extending into the fronto-parietal white matter. Head MRA did not show any explanatory stenosis and neck MRA was significant for stenosis of the origins of the vertebral arteries but otherwise unremarkable. Her second stroke was in January of 2018 when she presented with L sided weakness and an NIHSS of 11. There was extinction, weakness, sensory loss and dysarthria. CTA suggested M2 occlusion and she received IV tPA and also mechanical thrombectomy.     Details of work up and such are described in my notes from April 3, 24 2018 and June 26, 2018. She has had a RUSTY and now has an ILR after short term monitoring did not show AFIB. SHe has had a hypercoagulable work up and has seen Hematology who did not feel that she had a specific hypercoagulable state. ESR and CRP were normal. We discussed a LP in 2019 and decided to defer it.      My last visit with this patient was in January of 2019 and she was due to follow up with me in a year with a repeat MRI. This visit was postponed due to the pandemic. In March of this year, (around 3/14/2021), Ms. Myers who lives in [place], developed what she describes as R sided weakness and similar to an earlier stroke. She says that on a Saturday around 2.30 PM, she  developed a HA (not her usual migraine) on the L side. She found that the HA was persistent on Sunday around 8 AM and she went to the ER. She then started feeling better but then developed numbness of her L leg. An exam at [facility] showed some R LE drift and sensory loss of R LE as well. Brain MRI did not show any new DWI abnormality. RUSTY showed a thickened mitral valve and no thrombus. EEG showed no seizures and no epileptogenic foci. [facility] did repeat some of the hypercoagulable work up but there was no explanation for the event. She had overnight oximetry but this was apparently normal according to the patient. At [facility] she was changed to dual antiplatelets and is also on a statin currently.    Today, I did an extensive review with the patient including reviewing all rare stroke syndromes. We did not find any syndrome which would apply in her case. We talked about whether she had any arthritis - she does not have arthritis but she has significant tendonitis involving both knees, arch of her foot, elbows. She also had rotator cuff issues and bilateral CPT surgery.    We reviewed her migraine history at length. Currently, she gets migraines every 2-3  months. Usually resolves with Caffeine. Symptoms include HA and light sensitivity. Separately, she has morning HA. These resolve with caffeine as well.     ASSESSMENT / PLAN  Encounter Diagnoses   Name Primary?     Cerebrovascular accident (CVA), unspecified mechanism (H) Yes     Chronic nonintractable headache, unspecified headache type      Migraine without status migrainosus, not intractable, unspecified migraine type      This 61 year old lady has had at least 2 cryptogenic events and another more recently likely TIA event. She has had an extensive work up for stroke and TIA and the events are cryptogenic. She has had an evaluation at [facility] as well. We discussed that we did not know her stroke mechanism despite an extensive work up. I will refer her to rheumatology after  discussing with patient in case further autoimmune work up is warranted.                Head CT / CTA  3/14/21  1. No acute finding on noncontrast head CT. Small probable old infarcts in the  right cerebellar hemisphere.  2. No arterial occlusion, high-grade stenosis or acute vascular injury in the  head or neck.  3. Diffuse thyroid enlargement.      MRI 3/14/21  FINDINGS: Multiple small, old infarcts in the right cerebellum. Additional  small, old infarct in the right parietal lobe. No acute infarct. No abnormal  diffusion restriction or evidence of hemorrhage. Mild leukoaraiosis.    The ventricles are normal in size and configuration. No extra-axial fluid  collection or evidence of hydrocephalus.    Grossly normal appearance of the orbits and pituitary gland. Minimal paranasal  sinus mucosal thickening.    IMPRESSION:  No acute infarct.    Current Outpatient Medications   Medication     albuterol (PROAIR HFA/PROVENTIL HFA/VENTOLIN HFA) 108 (90 Base) MCG/ACT inhaler     alcohol swab prep pads     atorvastatin (LIPITOR) 20 MG tablet     blood glucose (NO BRAND SPECIFIED) test strip     blood glucose monitoring (NO BRAND SPECIFIED) meter device kit     Blood Pressure Monitoring KIT     cetirizine (ZYRTEC) 10 MG tablet     clopidogrel (PLAVIX) 75 MG tablet     diphenhydrAMINE (BENADRYL) 25 MG tablet     DIPHENHYDRAMINE HCL PO     fluticasone (FLONASE ALLERGY RELIEF) 50 MCG/ACT spray     lisinopril (ZESTRIL) 20 MG tablet     montelukast (SINGULAIR) 10 MG tablet     naproxen sodium (ALEVE) 220 MG capsule     pantoprazole (PROTONIX) 40 MG EC tablet     potassium chloride ER (K-TAB/KLOR-CON) 10 MEQ CR tablet     thin (NO BRAND SPECIFIED) lancets     triamcinolone (KENALOG) 0.1 % external cream     aspirin (ASA) 81 MG EC tablet     Current Facility-Administered Medications   Medication     lidocaine (PF) (XYLOCAINE) 1 % injection 1 mL     methylPREDNISolone (DEPO-MEDROL) injection 40 mg          EXAM      Orientation:  Normal; Language normal; Attention: DLROW; normal  Cranial nerves: EOMI; face symmetric, tongue midline, shoulder shrug normal  Motor: FFM normal bilaterally; No drift; Strength antigravity or better  in both UE and LE  Sensory: no deficits to LT  Co-ordination normal in both UE and LE    Gait: normal base steady can walk on heels toes and tandem without difficulty            José Miguel Snyder MD

## 2021-04-13 NOTE — PROGRESS NOTES
Natali is a 61 year old who is being evaluated via a billable video visit.      How would you like to obtain your AVS? [email or web address]  If the video visit is dropped, the invitation should be resent by: [phone]  Will anyone else be joining your video visit? no    Video Start Time: 11:45 AM  Video-Visit Details    Type of service:  Video Visit    Video End Time:12:27 PM    Originating Location (pt. Location): Home    Distant Location (provider location):  [facility] NEUROLOGY CLINIC [place]     Platform used for Video Visit: [employer]

## 2021-04-13 NOTE — LETTER
4/13/2021       RE: Natali Myers  [address] [facility] [address]     Dear Colleague,    Thank you for referring your patient, Natali Myers, to the [facility] NEUROLOGY CLINIC [place] [facility]. Please see a copy of my visit note below.    Natali is a 61 year old who is being evaluated via a billable video visit.      How would you like to obtain your AVS? abiel  If the video visit is dropped, the invitation should be resent by: [phone]  Will anyone else be joining your video visit? no    Video-Visit Details    Type of service:  Video Visit    Video Start Time: 11:45 AM  Video End Time:12:27 PM    Originating Location (pt. Location): Home    Distant Location (provider location):  [facility] NEUROLOGY [facility]     Platform used for Video Visit: [employer]        [facility] NEUROLOGY [place] [address] [facility]  3RD FLOOR  [facility] [address]  [phone]      April 14, 2021     Natali Myers                                                                                                                     [address] [facility] [address]  Video visit: 11:45 AM - 12:27 PM   Chart review, documentation: 30   Total 72 minutes      Ms. Myers is a very pleasant 61 year old lady who has had at least 2 symptomatic strokes. To summarize her stroke history, the first stroke was in 2016 where she presented with a left leg numbness and weakness and a brain MRI showed a small infarct close to or touching the central sulcus and extending into the fronto-parietal white matter. Head MRA did not show any explanatory stenosis and neck MRA was significant for stenosis of the origins of the vertebral arteries but otherwise unremarkable. Her second stroke was in January of 2018 when she presented with L sided weakness and an NIHSS of 11. There was extinction, weakness, sensory loss and  dysarthria. CTA suggested M2 occlusion and she received IV tPA and also mechanical thrombectomy.     Details of work up and such are described in my notes from April 3, 24 2018 and June 26, 2018. She has had a RUSTY and now has an ILR after short term monitoring did not show AFIB. SHe has had a hypercoagulable work up and has seen Hematology who did not feel that she had a specific hypercoagulable state. ESR and CRP were normal. We discussed a LP in 2019 and decided to defer it.      My last visit with this patient was in January of 2019 and she was due to follow up with me in a year with a repeat MRI. This visit was postponed due to the pandemic. In March of this year, (around 3/14/2021), Ms. Myers who lives in [place], developed what she describes as R sided weakness and similar to an earlier stroke. She says that on a Saturday around 2.30 PM, she developed a HA (not her usual migraine) on the L side. She found that the HA was persistent on Sunday around 8 AM and she went to the ER. She then started feeling better but then developed numbness of her L leg. An exam at [facility] showed some R LE drift and sensory loss of R LE as well. Brain MRI did not show any new DWI abnormality. RUSTY showed a thickened mitral valve and no thrombus. EEG showed no seizures and no epileptogenic foci. [facility] did repeat some of the hypercoagulable work up but there was no explanation for the event. She had overnight oximetry but this was apparently normal according to the patient. At [facility] she was changed to dual antiplatelets and is also on a statin currently.    Today, I did an extensive review with the patient including reviewing all rare stroke syndromes. We did not find any syndrome which would apply in her case. We talked about whether she had any arthritis - she does not have arthritis but she has significant tendonitis involving both knees, arch of her foot, elbows. She also had rotator cuff issues and bilateral CPT surgery.    We  reviewed her migraine history at length. Currently, she gets migraines every 2-3  months. Usually resolves with Caffeine. Symptoms include HA and light sensitivity. Separately, she has morning HA. These resolve with caffeine as well.     ASSESSMENT / PLAN  Encounter Diagnoses   Name Primary?     Cerebrovascular accident (CVA), unspecified mechanism (H) Yes     Chronic nonintractable headache, unspecified headache type      Migraine without status migrainosus, not intractable, unspecified migraine type      This 61 year old lady has had at least 2 cryptogenic events and another more recently likely TIA event. She has had an extensive work up for stroke and TIA and the events are cryptogenic. She has had an evaluation at [facility] as well. We discussed that we did not know her stroke mechanism despite an extensive work up. I will refer her to rheumatology after discussing with patient in case further autoimmune work up is warranted.                Head CT / CTA  3/14/21  1. No acute finding on noncontrast head CT. Small probable old infarcts in the  right cerebellar hemisphere.  2. No arterial occlusion, high-grade stenosis or acute vascular injury in the  head or neck.  3. Diffuse thyroid enlargement.      MRI 3/14/21  FINDINGS: Multiple small, old infarcts in the right cerebellum. Additional  small, old infarct in the right parietal lobe. No acute infarct. No abnormal  diffusion restriction or evidence of hemorrhage. Mild leukoaraiosis.    The ventricles are normal in size and configuration. No extra-axial fluid  collection or evidence of hydrocephalus.    Grossly normal appearance of the orbits and pituitary gland. Minimal paranasal  sinus mucosal thickening.    IMPRESSION:  No acute infarct.    Current Outpatient Medications   Medication     albuterol (PROAIR HFA/PROVENTIL HFA/VENTOLIN HFA) 108 (90 Base) MCG/ACT inhaler     alcohol swab prep pads     atorvastatin (LIPITOR) 20 MG tablet     blood glucose (NO BRAND  SPECIFIED) test strip     blood glucose monitoring (NO BRAND SPECIFIED) meter device kit     Blood Pressure Monitoring KIT     cetirizine (ZYRTEC) 10 MG tablet     clopidogrel (PLAVIX) 75 MG tablet     diphenhydrAMINE (BENADRYL) 25 MG tablet     DIPHENHYDRAMINE HCL PO     fluticasone (FLONASE ALLERGY RELIEF) 50 MCG/ACT spray     lisinopril (ZESTRIL) 20 MG tablet     montelukast (SINGULAIR) 10 MG tablet     naproxen sodium (ALEVE) 220 MG capsule     pantoprazole (PROTONIX) 40 MG EC tablet     potassium chloride ER (K-TAB/KLOR-CON) 10 MEQ CR tablet     thin (NO BRAND SPECIFIED) lancets     triamcinolone (KENALOG) 0.1 % external cream     aspirin (ASA) 81 MG EC tablet     Current Facility-Administered Medications   Medication     lidocaine (PF) (XYLOCAINE) 1 % injection 1 mL     methylPREDNISolone (DEPO-MEDROL) injection 40 mg         EXAM    Orientation: Normal; Language normal; Attention: DLROW; normal  Cranial nerves: EOMI; face symmetric, tongue midline, shoulder shrug normal  Motor: FFM normal bilaterally; No drift; Strength antigravity or better  in both UE and LE  Sensory: no deficits to LT  Co-ordination normal in both UE and LE    Gait: normal base steady can walk on heels toes and tandem without difficulty    José Miguel Snyder MD

## 2021-04-26 DIAGNOSIS — R05.9 COUGH: ICD-10-CM

## 2021-04-28 RX ORDER — MONTELUKAST SODIUM 10 MG/1
10 TABLET ORAL AT BEDTIME
Qty: 90 TABLET | Refills: 0 | Status: SHIPPED | OUTPATIENT
Start: 2021-04-28 | End: 2021-05-20

## 2021-04-28 NOTE — TELEPHONE ENCOUNTER
montelukast (SINGULAIR) 10 MG tablet   Take 1 tablet (10 mg) by mouth At Bedtime      Last Written Prescription Date:  1/30/20  Last Fill Quantity: 90,   # refills: 3  Last Office Visit : 3/25/21  Return in about 1 month (around 4/26/2021) for BP Recheck, follow-up.  Future Office visit:  none    Routing refill request to provider for review/approval because:  Overdue ACT . Clinic notified.    90 day to pharmacy

## 2021-05-05 DIAGNOSIS — I63.9 ACUTE CVA (CEREBROVASCULAR ACCIDENT) (H): ICD-10-CM

## 2021-05-05 DIAGNOSIS — I10 BENIGN ESSENTIAL HYPERTENSION: ICD-10-CM

## 2021-05-05 DIAGNOSIS — K21.9 GASTROESOPHAGEAL REFLUX DISEASE WITHOUT ESOPHAGITIS: ICD-10-CM

## 2021-05-05 DIAGNOSIS — E87.6 HYPOKALEMIA: ICD-10-CM

## 2021-05-07 RX ORDER — LISINOPRIL 20 MG/1
20 TABLET ORAL DAILY
Qty: 90 TABLET | Refills: 0 | Status: SHIPPED | OUTPATIENT
Start: 2021-05-07 | End: 2021-05-07

## 2021-05-07 RX ORDER — ATORVASTATIN CALCIUM 20 MG/1
20 TABLET, FILM COATED ORAL DAILY
Qty: 90 TABLET | Refills: 0 | Status: SHIPPED | OUTPATIENT
Start: 2021-05-07 | End: 2021-05-20

## 2021-05-07 RX ORDER — LISINOPRIL 20 MG/1
20 TABLET ORAL DAILY
Qty: 90 TABLET | Refills: 0 | Status: SHIPPED | OUTPATIENT
Start: 2021-05-07 | End: 2021-05-20

## 2021-05-07 RX ORDER — CLOPIDOGREL BISULFATE 75 MG/1
75 TABLET ORAL DAILY
Qty: 90 TABLET | Refills: 0 | Status: SHIPPED | OUTPATIENT
Start: 2021-05-07 | End: 2021-05-20

## 2021-05-07 RX ORDER — PANTOPRAZOLE SODIUM 40 MG/1
40 TABLET, DELAYED RELEASE ORAL DAILY
Qty: 90 TABLET | Refills: 0 | Status: SHIPPED | OUTPATIENT
Start: 2021-05-07 | End: 2021-05-20

## 2021-05-07 RX ORDER — ATORVASTATIN CALCIUM 20 MG/1
20 TABLET, FILM COATED ORAL DAILY
Qty: 90 TABLET | Refills: 0 | Status: SHIPPED | OUTPATIENT
Start: 2021-05-07 | End: 2021-05-07

## 2021-05-07 RX ORDER — CLOPIDOGREL BISULFATE 75 MG/1
75 TABLET ORAL DAILY
Qty: 90 TABLET | Refills: 0 | Status: SHIPPED | OUTPATIENT
Start: 2021-05-07 | End: 2021-05-07

## 2021-05-07 NOTE — TELEPHONE ENCOUNTER
Last Clinic Visit: 3/25/21 recommended 1 month follow up, no upcoming appointments. Last labs in care everywhere 3/14/21, LDL, creatinine, potassium, CBC, all within limits

## 2021-05-07 NOTE — TELEPHONE ENCOUNTER
Patient was called, and RN let her know 3 Rx's would be sent to [facility] in [place] and pantoprozole would be sent to [facility] in [place].    Elyse Del Castillo RN on 5/7/2021 at 2:47 PM

## 2021-05-20 ENCOUNTER — VIRTUAL VISIT (OUTPATIENT)
Dept: FAMILY MEDICINE | Facility: CLINIC | Age: 61
End: 2021-05-20
Payer: COMMERCIAL

## 2021-05-20 DIAGNOSIS — L30.0 NUMMULAR ECZEMA: ICD-10-CM

## 2021-05-20 DIAGNOSIS — I63.9 ACUTE CVA (CEREBROVASCULAR ACCIDENT) (H): ICD-10-CM

## 2021-05-20 DIAGNOSIS — R73.09 ELEVATED HEMOGLOBIN A1C: Primary | ICD-10-CM

## 2021-05-20 DIAGNOSIS — K21.9 GASTROESOPHAGEAL REFLUX DISEASE WITHOUT ESOPHAGITIS: ICD-10-CM

## 2021-05-20 DIAGNOSIS — E87.6 HYPOKALEMIA: ICD-10-CM

## 2021-05-20 DIAGNOSIS — I10 BENIGN ESSENTIAL HYPERTENSION: ICD-10-CM

## 2021-05-20 DIAGNOSIS — R05.9 COUGH: ICD-10-CM

## 2021-05-20 PROCEDURE — 99215 OFFICE O/P EST HI 40 MIN: CPT | Mod: 95 | Performed by: FAMILY MEDICINE

## 2021-05-20 RX ORDER — LISINOPRIL 20 MG/1
20 TABLET ORAL DAILY
Qty: 90 TABLET | Refills: 3 | Status: SHIPPED | OUTPATIENT
Start: 2021-05-20

## 2021-05-20 RX ORDER — MONTELUKAST SODIUM 10 MG/1
10 TABLET ORAL AT BEDTIME
Qty: 90 TABLET | Refills: 3 | Status: SHIPPED | OUTPATIENT
Start: 2021-05-20

## 2021-05-20 RX ORDER — CLOPIDOGREL BISULFATE 75 MG/1
75 TABLET ORAL DAILY
Qty: 90 TABLET | Refills: 3 | Status: SHIPPED | OUTPATIENT
Start: 2021-05-20

## 2021-05-20 RX ORDER — POTASSIUM CHLORIDE 750 MG/1
10 TABLET, EXTENDED RELEASE ORAL 2 TIMES DAILY
Qty: 180 TABLET | Refills: 3 | Status: SHIPPED | OUTPATIENT
Start: 2021-05-20 | End: 2022-05-19

## 2021-05-20 RX ORDER — ATORVASTATIN CALCIUM 20 MG/1
20 TABLET, FILM COATED ORAL DAILY
Qty: 90 TABLET | Refills: 3 | Status: SHIPPED | OUTPATIENT
Start: 2021-05-20

## 2021-05-20 RX ORDER — PANTOPRAZOLE SODIUM 40 MG/1
40 TABLET, DELAYED RELEASE ORAL DAILY
Qty: 90 TABLET | Refills: 3 | Status: SHIPPED | OUTPATIENT
Start: 2021-05-20

## 2021-05-20 RX ORDER — TRIAMCINOLONE ACETONIDE 1 MG/G
CREAM TOPICAL 2 TIMES DAILY PRN
Qty: 80 G | Refills: 3 | Status: SHIPPED | OUTPATIENT
Start: 2021-05-20

## 2021-05-20 NOTE — PROGRESS NOTES
Natali is a 61 year old who is being evaluated via a billable video visit.      How would you like to obtain your AVS? Latoyaharkyle  If the video visit is dropped, the invitation should be resent by: Text to cell phone: [email or web address]  Will anyone else be joining your video visit? No    Assessment & Plan   Natali Myers is a 61 year old female with history of stroke, hypertension, hypokalemia, anemia resolved after hysterectomy, Mitral valve stenosis, loop recorder, intermittent asthma, seasonal allergies, dry eye, joint pains, pre-diabetes GERD  who presents today after ER visit March 14 [facility] with evaluation for stroke including CT acute stroke stroke protocol with and without IV contrast and MRI of brain echocardiogram showing mitral valve stenosis.  She had no new stroke findings and negative coagulopathy work-up. She has follow-up with neurology taking a supplement has relieved daily Headaches and she is grateful..   Lab previoulsy indicated elevated A1c at 6.2 in prediabetes range.  She has been monitoring with glucose levels in great range. Will order labs to be completed. She would like to have completed at [facility] if possible as she is in [place].       Elevated hemoglobin A1c  Lab after 6/14 for follow-up of slightly elevated a1C, increase physical activity to 30 minutes most days, will be off work the end of May also will be cleaning her home as will have visitors  for several months  - Hemoglobin A1c    Acute CVA (cerebrovascular accident) (H)  Follows with neurology, refills provided per request  - atorvastatin (LIPITOR) 20 MG tablet  Dispense: 90 tablet; Refill: 3  - clopidogrel (PLAVIX) 75 MG tablet  Dispense: 90 tablet; Refill: 3    Benign essential hypertension  Monitoring BP  - lisinopril (ZESTRIL) 20 MG tablet  Dispense: 90 tablet; Refill: 3  - Comprehensive metabolic panel    Cough  Refill provided per request  - montelukast (SINGULAIR) 10 MG tablet  Dispense: 90 tablet; Refill:  3    Hypokalemia  Prefers Klor-con  - potassium chloride ER (K-TAB/KLOR-CON) 10 MEQ CR tablet  Dispense: 180 tablet; Refill: 3    Gastroesophageal reflux disease without esophagitis  This RX sent to [facility]  - pantoprazole (PROTONIX) 40 MG EC tablet  Dispense: 90 tablet; Refill: 3    Nummular eczema  Refill provided  - triamcinolone (KENALOG) 0.1 % external cream  Dispense: 80 g; Refill: 3  50 minutes spent on the date of the encounter doing chart review, history, exam, diagnostics review, documentation, counseling and coordination of cares as noted.    6 month follow-up earlier if concerns                 Rafael Florian MD  [facility] [place]    Angy Hurst is a 61 year old who presents for the following health issues     HPI   Natali Myers is a 61 year old female with history of stroke, hypertension, hypokalemia, anemia resolved after hysterectomy, Mitral valve stenosis, loop recorder, intermittent asthma, seasonal allergies, dry eye, joint pains, pre-diabetes GERD  who presents today after ER visit March 14 [facility] with evaluation for stroke including CT acute stroke stroke protocol with and without IV contrast and MRI of brain echocardiogram showing mitral valve stenosis.  She had no new stroke findings and negative coagulopathy work-up.  Lab indicated elevated A1c at 6.2 in prediabetes range.     Neurology  In April she met with neurology who recommended Rheumatology assessment to consider autoimmune condition She may be coordinating Rheumatology through [place] as she is in [place]. She believes she had a negative Rheumatological evaluation in the past.  Also meeting with neurology assisted with reduction in daily headaches through recommendation of supplement has been taking Calcium mag Zinc and Vit d very helpful.  Pre-Diabetes A1C 6.2   At last visit we discussed possibility of starting Metformin she wanted to work on lifestyle modification and close  follow-up with her checking blood sugars fasting and 2 hours postprandial up to twice per day with goal blood sugars being less than 140 fasting and less than 180 2 hours postprandial.   Fasting range 125-140 every morning. If she gets up very early 5 am in the 120s   2-3 hours after meals  145-165  Prior to meals 120-125.  Eats dinner at 6 pm and if eats a Raisin oatmeal cookie at 9 pm at night her sugar will be lower in the am.  Has been trying to walk on the weekends.  Currently living in [place]. Contract will end in May will have time off and will  Work on increased physical activity.  She completed several health maintenance items including screening mammogram normal  and COVID-19 vaccine     She requested medication refills be sent to [facility] pharmacy  Except pantoprazole to [facility]. Requested steroid cream refill for  Eczema.            Labs reviewed in EPIC  BP Readings from Last 3 Encounters:   03/25/21 110/73   01/30/20 116/80   12/05/19 128/83    Wt Readings from Last 3 Encounters:   01/30/20 113.4 kg (250 lb)   12/05/19 113.8 kg (250 lb 12.8 oz)   02/26/19 110.9 kg (244 lb 8 oz)               Immunization History   Administered Date(s) Administered     COVID-19,PF,Pfizer 04/07/2021, 04/28/2021     FLU 6-35 months 12/05/2017     Flu, Unspecified 10/21/2015     HepA, Unspecified 10/05/1998     HepA-Adult 10/05/1998     Influenza (IIV3) PF 11/05/2003, 09/22/2010, 10/22/2014, 10/03/2016     Influenza Vaccine IM > 6 months Valent IIV4 11/20/2018, 01/30/2020     Pneumococcal 23 valent 01/30/2020     Td (Adult), Adsorbed 10/17/1982, 04/09/1992     Tdap (Adacel,Boostrix) 08/24/2012     Tdap (Adult) Unspecified Formulation 10/17/1982, 04/09/1992        Patient Active Problem List   Diagnosis     Esophageal reflux (GERD)     BMI 40.0-44.9, adult (H)     Anemia     S/P total hysterectomy and bilateral salpingo-oophorectomy     Acute ischemic stroke (H)     Stroke (cerebrum) (H)     Essential hypertension, benign      Status post placement of implantable loop recorder     Hyperlipidemia LDL goal <70     Hypokalemia     Mild intermittent asthma without complication     Hx of mitral valve stenosis     History of TIA (transient ischemic attack) and stroke     Pre-diabetes     Past Surgical History:   Procedure Laterality Date     DILATION AND CURETTAGE  5/24/2014    Procedure: DILATION AND CURETTAGE;  Surgeon: Elyse Mcconnell MD;  Location: [facility] OR     HYSTERECTOMY TOTAL ABDOMINAL  6/19/2014    Procedure: HYSTERECTOMY TOTAL ABDOMINAL;  Surgeon: Elyse Mcconnell MD;  Location: [facility] OR     KNEE SURGERY  age 23     cartilage/ incision both knees     RELEASE CARPAL TUNNEL BILATERAL  1996    [place]     SALPINGO-OOPHORECTOMY BILATERAL  6/19/2014    Procedure: SALPINGO-OOPHORECTOMY BILATERAL;  Surgeon: Elyse Mcconnell MD;  Location: [facility] OR     WRIST SURGERY         Social History     Tobacco Use     Smoking status: Never Smoker     Smokeless tobacco: Never Used   Substance Use Topics     Alcohol use: Yes     Comment: One a month      Family History   Problem Relation Age of Onset     Diabetes Mother      Blood Disease Mother         ITP      Kidney Disease Mother      Cancer Mother      Cardiovascular Father      Alcohol/Drug Father      Cerebrovascular Disease Father 70        ,     Dementia Father      Diabetes Maternal Grandmother      Thyroid Disease Maternal Grandmother      Cancer Paternal Grandmother         eye      Dementia Paternal Grandmother      Chronic Obstructive Pulmonary Disease Paternal Grandfather         [employer] [facility] [employer] [facility]     Asthma Brother      Multiple Sclerosis Son 30     Asthma Son          Current Outpatient Medications   Medication Sig Dispense Refill     albuterol (PROAIR HFA/PROVENTIL HFA/VENTOLIN HFA) 108 (90 Base) MCG/ACT inhaler Inhale 2 puffs into the lungs every 4 hours as needed for shortness of breath / dyspnea or wheezing 1 Inhaler 1     alcohol swab prep pads Use to swab area of injection/chapincito as  directed. 100 each 3     aspirin (ASA) 81 MG EC tablet Take 1 tablet (81 mg) by mouth daily       atorvastatin (LIPITOR) 20 MG tablet Take 1 tablet (20 mg) by mouth daily Please schedule a follow-up appointment at [phone] 90 tablet 0     blood glucose (NO BRAND SPECIFIED) test strip Use to test blood sugar up to 2 times daily fasting and 2 hours after dinner or as directed if symptoms of low or high glucose supply per insurance for testing with glucose meter 100 strip 4     blood glucose monitoring (NO BRAND SPECIFIED) meter device kit Use to test blood sugar up to 2 times daily fasting and 2 hours after dinner or as directed if symptoms of low or high glucose per insurance 1 kit 0     Blood Pressure Monitoring KIT 1 Device daily Pt to check BP readings daily at home 1 kit 0     cetirizine (ZYRTEC) 10 MG tablet Take 10 mg by mouth daily       clopidogrel (PLAVIX) 75 MG tablet Take 1 tablet (75 mg) by mouth daily Please schedule a follow-up appointment at [phone] 90 tablet 0     diphenhydrAMINE (BENADRYL) 25 MG tablet Take 25 mg by mouth       DIPHENHYDRAMINE HCL PO Take 25 mg by mouth nightly as needed        fluticasone (FLONASE ALLERGY RELIEF) 50 MCG/ACT spray Spray 2 sprays into both nostrils daily 1 Bottle 3     lisinopril (ZESTRIL) 20 MG tablet Take 1 tablet (20 mg) by mouth daily Please schedule a follow-up appointment at [phone] 90 tablet 0     montelukast (SINGULAIR) 10 MG tablet Take 1 tablet (10 mg) by mouth At Bedtime 90 tablet 0     naproxen sodium (ALEVE) 220 MG capsule        pantoprazole (PROTONIX) 40 MG EC tablet Take 1 tablet (40 mg) by mouth daily Call clinic to schedule follow up appointment. 90 tablet 0     potassium chloride ER (K-TAB/KLOR-CON) 10 MEQ CR tablet Take 1 tablet (10 mEq) by mouth 2 times daily 180 tablet 1     thin (NO BRAND SPECIFIED) lancets Use with lanceting device to accompany kit Use to test blood sugar up to 2 times daily fasting and 2 hours after dinner or as  directed if symptoms of low or high glucose per insurance 100 each 4     triamcinolone (KENALOG) 0.1 % external cream Apply topically 2 times daily as needed for irritation (eczema) 80 g 3     Allergies   Allergen Reactions     Other Drug Allergy (See Comments) Rash     Neoprene rubber patch test confirmed 2014 knee brace reaction skin rash     Mold      Seasonal Allergies      Hay fever     Codeine Rash     No Clinical Screening - See Comments Rash     Neoprene rubber       Penicillins Rash     Recent Labs   Lab Test 02/07/20  0737 11/08/19  1149 11/30/18  0748 02/01/18  0508 02/01/18  0508 01/31/18  2159 01/31/18  1637 01/31/18  1637 12/04/17  0727 12/04/17  0727 07/20/16  0831 07/20/16  0831   A1C  --   --   --   --   --  5.6  --   --   --  6.0  --  5.6   LDL 28  --  46  --  35  --   --   --   --  23   < > 96   HDL 47*  --  48*  --  47*  --   --   --   --  56   < > 44*   TRIG 198*  --  166*  --  91  --   --   --   --  142   < > 202*   ALT 20  --   --   --   --   --   --  26  --   --   --  25   CR 0.97 0.98 0.89   < > 0.72 0.70  --  0.76  --  0.87  --   --    GFRESTIMATED 63 62 65   < > 83 87   < > 78  --  67  --   --    GFRESTBLACK 74 72 79   < > >90 >90   < > >90  --  81  --   --    POTASSIUM 4.0 3.8 4.0   < > 3.8 3.0*   < > 2.9*   < > 2.9*   < >  --    TSH 2.78  --   --   --   --   --   --   --   --  2.89  --   --     < > = values in this interval not displayed.      Review of Systems         Objective           Vitals:  No vitals were obtained today due to virtual visit.    Physical Exam   GENERAL: Healthy, alert  no distress, appears interactive, detail oriented and speech is clear forthcoming with information. Last BMI over 40  EYES: Eyes grossly normal to inspection.  No discharge or erythema, or obvious scleral/conjunctival abnormalities.  RESP: No audible wheeze, cough, or visible cyanosis.  No visible retractions or increased work of breathing.    SKIN: Visible skin clear. No significant rash, abnormal  pigmentation or lesions.  NEURO: Cranial nerves grossly intact.  Mentation and speech appropriate for age. NO focal deficits observed but exam limited by video visit  PSYCH: Mentation appears intelligent, detail oriented, interactive, smiling,normal speech and appearance groomed.  PHQ-2 Score:     PHQ-2 ( 1999 Pfizer) 3/25/2021 1/30/2020   Q1: Little interest or pleasure in doing things 0 0   Q2: Feeling down, depressed or hopeless 1 0   PHQ-2 Score 1 0       Examination: Bilateral digital screening mammography with computer  aided detection.     Comparison: 11/28/2018, 9/30/2016, 4/5/2013, 12/29/2008     History/Family history: No symptoms, routine screening.     BREAST DENSITY: Scattered fibroglandular densities.     COMMENTS: There has been no significant change.                                                                       IMPRESSION: BI-RADS CATEGORY: 1 -  Negative.     RECOMMENDED FOLLOW-UP: Annual Mammography.     Results to be sent to the patient.     I have personally reviewed the examination and initial interpretation  and I agree with the findings.     MATTHEW DAI MD          Video-Visit Details    Type of service:  Video Visit    Video  Time: 11:33-11:56 AM    Originating Location (pt. Location): Home    Distant Location (provider location):  [facility] [place]     Platform used for Video Visit: [employer]

## 2021-05-20 NOTE — Clinical Note
Please assist with Orders for lab  after 6/14 she would like to complete in [place] but I'm not sure they will accept my orders. Could mail order request to her otherwise may have to coordinate when she is in [facility] sometime this summer.  Best wishes,  Rafael Florian MD

## 2021-05-20 NOTE — NURSING NOTE
Chief Complaint   Patient presents with     Recheck Medication     follow up     Kimberly Nissen, EMT at 10:57 AM on 5/20/2021    Video Visit Technology for this patient: [employer]im[employer] Video Visit- Patient was left in waiting room

## 2021-05-21 ENCOUNTER — TELEPHONE (OUTPATIENT)
Dept: FAMILY MEDICINE | Facility: CLINIC | Age: 61
End: 2021-05-21

## 2021-05-21 ASSESSMENT — ASTHMA QUESTIONNAIRES: ACT_TOTALSCORE: 23

## 2021-05-21 NOTE — TELEPHONE ENCOUNTER
----- Message from Elyse Del Castillo RN sent at 5/21/2021 11:57 AM CDT -----  Regarding: labs need to be mailed  Mohan Krause,  Please mail the CMP and A1C lab orders to patient's home address in [place].    Thanks,  Elyse

## 2021-05-24 ENCOUNTER — TELEPHONE (OUTPATIENT)
Dept: FAMILY MEDICINE | Facility: CLINIC | Age: 61
End: 2021-05-24

## 2021-05-24 NOTE — TELEPHONE ENCOUNTER
Trying talking to patient phone dropped. Please schedule a 6 month in person follow up with Dr. Florian.

## 2021-05-30 ENCOUNTER — RECORDS - HEALTHEAST (OUTPATIENT)
Dept: ADMINISTRATIVE | Facility: CLINIC | Age: 61
End: 2021-05-30

## 2021-07-31 ENCOUNTER — ANCILLARY PROCEDURE (OUTPATIENT)
Dept: CARDIOLOGY | Facility: CLINIC | Age: 61
End: 2021-07-31
Attending: INTERNAL MEDICINE
Payer: COMMERCIAL

## 2021-07-31 DIAGNOSIS — I63.9 CRYPTOGENIC STROKE (H): ICD-10-CM

## 2021-07-31 PROCEDURE — 93298 REM INTERROG DEV EVAL SCRMS: CPT | Performed by: INTERNAL MEDICINE

## 2021-07-31 PROCEDURE — G2066 INTER DEVC REMOTE 30D: HCPCS

## 2021-09-12 ENCOUNTER — HEALTH MAINTENANCE LETTER (OUTPATIENT)
Age: 61
End: 2021-09-12

## 2021-11-09 ENCOUNTER — ANCILLARY PROCEDURE (OUTPATIENT)
Dept: CARDIOLOGY | Facility: CLINIC | Age: 61
End: 2021-11-09
Attending: INTERNAL MEDICINE
Payer: COMMERCIAL

## 2021-11-09 DIAGNOSIS — I63.9 CRYPTOGENIC STROKE (H): ICD-10-CM

## 2021-11-09 PROCEDURE — 93297 REM INTERROG DEV EVAL ICPMS: CPT | Performed by: INTERNAL MEDICINE

## 2021-11-09 PROCEDURE — G2066 INTER DEVC REMOTE 30D: HCPCS

## 2021-12-09 LAB
MDC_IDC_MSMT_BATTERY_STATUS: NORMAL
MDC_IDC_PG_IMPLANT_DTM: NORMAL
MDC_IDC_PG_MFG: NORMAL
MDC_IDC_PG_MODEL: NORMAL
MDC_IDC_PG_SERIAL: NORMAL
MDC_IDC_PG_TYPE: NORMAL
MDC_IDC_SESS_CLINIC_NAME: NORMAL
MDC_IDC_SESS_DTM: NORMAL
MDC_IDC_SESS_TYPE: NORMAL
MDC_IDC_SET_ZONE_DETECTION_INTERVAL: 2000 MS
MDC_IDC_SET_ZONE_DETECTION_INTERVAL: 3000 MS
MDC_IDC_SET_ZONE_DETECTION_INTERVAL: 350 MS
MDC_IDC_SET_ZONE_TYPE: NORMAL
MDC_IDC_STAT_AT_BURDEN_PERCENT: 0 %
MDC_IDC_STAT_AT_DTM_END: NORMAL
MDC_IDC_STAT_AT_DTM_START: NORMAL
MDC_IDC_STAT_EPISODE_RECENT_COUNT: 0
MDC_IDC_STAT_EPISODE_RECENT_COUNT: 2
MDC_IDC_STAT_EPISODE_RECENT_COUNT_DTM_END: NORMAL
MDC_IDC_STAT_EPISODE_RECENT_COUNT_DTM_START: NORMAL
MDC_IDC_STAT_EPISODE_TOTAL_COUNT: 0
MDC_IDC_STAT_EPISODE_TOTAL_COUNT: 3
MDC_IDC_STAT_EPISODE_TOTAL_COUNT_DTM_END: NORMAL
MDC_IDC_STAT_EPISODE_TOTAL_COUNT_DTM_START: NORMAL
MDC_IDC_STAT_EPISODE_TYPE: NORMAL

## 2022-01-07 ENCOUNTER — ANCILLARY PROCEDURE (OUTPATIENT)
Dept: CARDIOLOGY | Facility: CLINIC | Age: 62
End: 2022-01-07
Attending: INTERNAL MEDICINE
Payer: COMMERCIAL

## 2022-01-07 DIAGNOSIS — I63.9 CRYPTOGENIC STROKE (H): ICD-10-CM

## 2022-01-07 PROCEDURE — G2066 INTER DEVC REMOTE 30D: HCPCS

## 2022-01-07 PROCEDURE — 99207 CARDIAC DEVICE CHECK - REMOTE: CPT | Performed by: INTERNAL MEDICINE

## 2022-01-10 LAB
MDC_IDC_MSMT_BATTERY_DTM: NORMAL
MDC_IDC_MSMT_BATTERY_STATUS: NORMAL
MDC_IDC_PG_IMPLANT_DTM: NORMAL
MDC_IDC_PG_MFG: NORMAL
MDC_IDC_PG_MODEL: NORMAL
MDC_IDC_PG_SERIAL: NORMAL
MDC_IDC_PG_TYPE: NORMAL
MDC_IDC_SESS_CLINIC_NAME: NORMAL
MDC_IDC_SESS_DTM: NORMAL
MDC_IDC_SESS_TYPE: NORMAL
MDC_IDC_SET_ZONE_TYPE: NORMAL
MDC_IDC_STAT_AT_BURDEN_PERCENT: 0 %
MDC_IDC_STAT_AT_DTM_END: NORMAL
MDC_IDC_STAT_AT_DTM_START: NORMAL
MDC_IDC_STAT_EPISODE_RECENT_COUNT: 0
MDC_IDC_STAT_EPISODE_RECENT_COUNT_DTM_END: NORMAL
MDC_IDC_STAT_EPISODE_RECENT_COUNT_DTM_START: NORMAL
MDC_IDC_STAT_EPISODE_TOTAL_COUNT: 0
MDC_IDC_STAT_EPISODE_TOTAL_COUNT: 3
MDC_IDC_STAT_EPISODE_TOTAL_COUNT_DTM_END: NORMAL
MDC_IDC_STAT_EPISODE_TOTAL_COUNT_DTM_START: NORMAL
MDC_IDC_STAT_EPISODE_TYPE: NORMAL

## 2022-01-19 ENCOUNTER — ANCILLARY PROCEDURE (OUTPATIENT)
Dept: CARDIOLOGY | Facility: CLINIC | Age: 62
End: 2022-01-19
Attending: INTERNAL MEDICINE
Payer: COMMERCIAL

## 2022-01-19 DIAGNOSIS — I63.9 CRYPTOGENIC STROKE (H): ICD-10-CM

## 2022-01-19 PROCEDURE — G2066 INTER DEVC REMOTE 30D: HCPCS

## 2022-01-19 PROCEDURE — 93298 REM INTERROG DEV EVAL SCRMS: CPT | Performed by: INTERNAL MEDICINE

## 2022-02-14 LAB
MDC_IDC_MSMT_BATTERY_DTM: NORMAL
MDC_IDC_MSMT_BATTERY_STATUS: NORMAL
MDC_IDC_PG_IMPLANT_DTM: NORMAL
MDC_IDC_PG_MFG: NORMAL
MDC_IDC_PG_MODEL: NORMAL
MDC_IDC_PG_SERIAL: NORMAL
MDC_IDC_PG_TYPE: NORMAL
MDC_IDC_SESS_CLINIC_NAME: NORMAL
MDC_IDC_SESS_DTM: NORMAL
MDC_IDC_SESS_TYPE: NORMAL
MDC_IDC_SET_ZONE_DETECTION_INTERVAL: 2000 MS
MDC_IDC_SET_ZONE_DETECTION_INTERVAL: 3000 MS
MDC_IDC_SET_ZONE_DETECTION_INTERVAL: 350 MS
MDC_IDC_SET_ZONE_TYPE: NORMAL
MDC_IDC_STAT_AT_BURDEN_PERCENT: 0 %
MDC_IDC_STAT_AT_DTM_END: NORMAL
MDC_IDC_STAT_AT_DTM_START: NORMAL
MDC_IDC_STAT_EPISODE_RECENT_COUNT: 0
MDC_IDC_STAT_EPISODE_RECENT_COUNT: 1
MDC_IDC_STAT_EPISODE_RECENT_COUNT: 2
MDC_IDC_STAT_EPISODE_RECENT_COUNT_DTM_END: NORMAL
MDC_IDC_STAT_EPISODE_RECENT_COUNT_DTM_START: NORMAL
MDC_IDC_STAT_EPISODE_TOTAL_COUNT: 0
MDC_IDC_STAT_EPISODE_TOTAL_COUNT: 1
MDC_IDC_STAT_EPISODE_TOTAL_COUNT: 2
MDC_IDC_STAT_EPISODE_TOTAL_COUNT: 3
MDC_IDC_STAT_EPISODE_TOTAL_COUNT_DTM_END: NORMAL
MDC_IDC_STAT_EPISODE_TOTAL_COUNT_DTM_START: NORMAL
MDC_IDC_STAT_EPISODE_TYPE: NORMAL

## 2022-04-24 ENCOUNTER — HEALTH MAINTENANCE LETTER (OUTPATIENT)
Age: 62
End: 2022-04-24

## 2022-05-18 DIAGNOSIS — E87.6 HYPOKALEMIA: ICD-10-CM

## 2022-05-19 RX ORDER — POTASSIUM CHLORIDE 750 MG/1
10 TABLET, EXTENDED RELEASE ORAL 2 TIMES DAILY
Qty: 180 TABLET | Refills: 0 | Status: SHIPPED | OUTPATIENT
Start: 2022-05-19

## 2022-05-19 NOTE — TELEPHONE ENCOUNTER
Patient is no longer receiving care here. She is going to the [facility] ER   Call to [facility] pharmacy, prescription sent today for potassium has been cancelled.  Advised pharmacy to ask Natali who her physician is and to send to her new PCP.

## 2022-05-19 NOTE — TELEPHONE ENCOUNTER
Last Clinic Visit: 5/20/2021  [facility] [place]  Recommended 6 month follow up. No upcoming appointments scheduled.  90 day refill provided, routed to clinic scheduling for follow up  Most recent potassium care everywhere 1/18/22  Potassium, S 3.6 - 5.2 mmol/L 4.2

## 2022-09-26 NOTE — TELEPHONE ENCOUNTER
PRIOR AUTHORIZATION DENIED    Medication: pantoprazole (PROTONIX) 40 MG EC tablet-DENIED    Denial Date: 11/12/2019    Denial Rational:           Appeal Information:            No

## 2022-10-30 ENCOUNTER — HEALTH MAINTENANCE LETTER (OUTPATIENT)
Age: 62
End: 2022-10-30

## 2023-06-01 ENCOUNTER — HEALTH MAINTENANCE LETTER (OUTPATIENT)
Age: 63
End: 2023-06-01

## 2023-08-27 DIAGNOSIS — R05.9 COUGH: ICD-10-CM

## 2023-08-30 RX ORDER — MONTELUKAST SODIUM 10 MG/1
1 TABLET ORAL AT BEDTIME
Qty: 90 TABLET | Refills: 0 | OUTPATIENT
Start: 2023-08-30

## 2023-08-30 NOTE — TELEPHONE ENCOUNTER
montelukast (SINGULAIR) 10 MG tablet 90 tablet 3 5/20/2021     Last Office Visit: 5/20/21  Future Office visit:   none  ACT TOTAL SCORE : 5/20/21--23      Leukotriene Inhibitors Protocol [id number]/2023 03:40 PM   Protocol Details Asthma control assessment score within normal limits in last 6 months    Recent (6 mo) or future (30 days) visit within the authorizing provider's specialty     Routing refill request to provider for review/approval because:  Overdue for follow up and ACT.    Carmen Vincent RN

## 2024-06-15 ENCOUNTER — HEALTH MAINTENANCE LETTER (OUTPATIENT)
Age: 64
End: 2024-06-15

## (undated) RX ORDER — FENTANYL CITRATE 50 UG/ML
INJECTION, SOLUTION INTRAMUSCULAR; INTRAVENOUS
Status: DISPENSED
Start: 2018-01-31

## (undated) RX ORDER — LIDOCAINE HYDROCHLORIDE 10 MG/ML
INJECTION, SOLUTION EPIDURAL; INFILTRATION; INTRACAUDAL; PERINEURAL
Status: DISPENSED
Start: 2018-12-13

## (undated) RX ORDER — LIDOCAINE HYDROCHLORIDE AND EPINEPHRINE 10; 10 MG/ML; UG/ML
INJECTION, SOLUTION INFILTRATION; PERINEURAL
Status: DISPENSED
Start: 2018-08-01

## (undated) RX ORDER — VERAPAMIL HYDROCHLORIDE 2.5 MG/ML
INJECTION, SOLUTION INTRAVENOUS
Status: DISPENSED
Start: 2018-01-31

## (undated) RX ORDER — CLINDAMYCIN PHOSPHATE 900 MG/50ML
INJECTION, SOLUTION INTRAVENOUS
Status: DISPENSED
Start: 2018-08-01

## (undated) RX ORDER — HEPARIN SODIUM 1000 [USP'U]/ML
INJECTION, SOLUTION INTRAVENOUS; SUBCUTANEOUS
Status: DISPENSED
Start: 2018-01-31

## (undated) RX ORDER — LIDOCAINE HYDROCHLORIDE 20 MG/ML
INJECTION, SOLUTION INFILTRATION; PERINEURAL
Status: DISPENSED
Start: 2018-08-01

## (undated) RX ORDER — FENTANYL CITRATE 50 UG/ML
INJECTION, SOLUTION INTRAMUSCULAR; INTRAVENOUS
Status: DISPENSED
Start: 2018-04-09

## (undated) RX ORDER — SODIUM CHLORIDE 9 MG/ML
INJECTION, SOLUTION INTRAVENOUS
Status: DISPENSED
Start: 2018-08-01

## (undated) RX ORDER — METHYLPREDNISOLONE ACETATE 40 MG/ML
INJECTION, SUSPENSION INTRA-ARTICULAR; INTRALESIONAL; INTRAMUSCULAR; SOFT TISSUE
Status: DISPENSED
Start: 2018-12-13